# Patient Record
Sex: MALE | Race: WHITE | NOT HISPANIC OR LATINO | Employment: OTHER | URBAN - METROPOLITAN AREA
[De-identification: names, ages, dates, MRNs, and addresses within clinical notes are randomized per-mention and may not be internally consistent; named-entity substitution may affect disease eponyms.]

---

## 2017-03-02 ENCOUNTER — OFFICE VISIT (OUTPATIENT)
Dept: NEUROLOGY | Facility: CLINIC | Age: 82
End: 2017-03-02

## 2017-03-02 VITALS
HEIGHT: 72 IN | DIASTOLIC BLOOD PRESSURE: 60 MMHG | WEIGHT: 167 LBS | BODY MASS INDEX: 22.62 KG/M2 | SYSTOLIC BLOOD PRESSURE: 110 MMHG

## 2017-03-02 DIAGNOSIS — B02.29 POST HERPETIC NEURALGIA: Primary | ICD-10-CM

## 2017-03-02 PROCEDURE — 99204 OFFICE O/P NEW MOD 45 MIN: CPT | Performed by: PSYCHIATRY & NEUROLOGY

## 2017-03-02 RX ORDER — PREGABALIN 75 MG/1
75 CAPSULE ORAL 2 TIMES DAILY
Qty: 60 CAPSULE | Refills: 5 | Status: SHIPPED | OUTPATIENT
Start: 2017-03-02 | End: 2017-03-30

## 2017-03-02 RX ORDER — PREGABALIN 75 MG/1
75 CAPSULE ORAL 2 TIMES DAILY
Qty: 28 CAPSULE | Refills: 0 | Status: SHIPPED | OUTPATIENT
Start: 2017-03-02 | End: 2017-03-30

## 2017-03-02 NOTE — PROGRESS NOTES
"CC: Postherpetic neuralgia    HPI:  Silas Haines is a  81 y.o.  right-handed white male who was sent for neurologic consultation by Dr. Parker who has a history of multiple myeloma diagnosed in 2014.  He developed shingles 10/2015 in the left thoracic dermatomes.  He has had persistent severe pain ever since.  He describes burning clamping and cutting sensations which are constant.  They average a pain score of 7/10.  He was tried on gabapentin and he believes he was up to 600 mg 3 times a day but he developed pedal edema.  He remains on a water pill every third day.  It doesn't accumulate significantly currently.  It was stopped.  He has tried Jennie's wort as well as other herbal remedies of no assistance.  Lidoderm was tried which helps transiently but he developed a rash from the patch.  An MRI of the thoracic spine was obtained showing a couple of minor disc bulges on the right.  Nothing on the left.  He has bony changes consistent with his myeloma.  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  He has no history of significant head trauma meningitis seizures stroke or syncope.  He has no complaint of headache.  Minimal back pain from degenerative disc disease.  An antidepressant was suggested but he didn't take it because of side effect profile      Past Medical History   Diagnosis Date   • Anemia 2014   • Bladder cancer 2010   • History of shingles 10/2015   • Malignant neoplasm of prostate    • Prostate cancer 2003   • Skin cancer (melanoma) 2014     Multiple myeloma         Past Surgical History   Procedure Laterality Date   • Bladder surgery     • Cataract extraction     • Coronary artery bypass graft       Triple   • Mohs surgery  08/2016     top of head with skin graft from left arm   • Cyst removal  1956     Pilonadil   • Prostate radioactive seed implant       Prostate \"seeding implant\"           Current Outpatient " Prescriptions:   •  aspirin 81 MG tablet, Take by mouth daily., Disp: , Rfl:   •  atorvastatin (LIPITOR) 20 MG tablet, Take 1 tablet by mouth daily., Disp: 90 tablet, Rfl: 1  •  Cholecalciferol (VITAMIN D-3) 1000 UNITS capsule, Take by mouth., Disp: , Rfl:   •  hydrochlorothiazide (MICROZIDE) 12.5 MG capsule, , Disp: , Rfl:   •  isosorbide mononitrate (IMDUR) 30 MG 24 hr tablet, Take by mouth., Disp: , Rfl:   •  lenalidomide (REVLIMID) 10 MG capsule, Take by mouth daily., Disp: , Rfl:   •  lisinopril (PRINIVIL,ZESTRIL) 5 MG tablet, Take 1 tablet by mouth Daily., Disp: 90 tablet, Rfl: 3  •  Multiple Vitamin (MULTI VITAMIN MENS) tablet, Take 1 tablet by mouth daily., Disp: , Rfl:   •  mupirocin (BACTROBAN) 2 % ointment, , Disp: , Rfl:   •  BETIMOL 0.5 % ophthalmic solution, , Disp: , Rfl:   •  brimonidine (ALPHAGAN) 0.2 % ophthalmic solution, Apply to eye., Disp: , Rfl:   •  lidocaine (LIDODERM) 5 %, , Disp: , Rfl:   •  pregabalin (LYRICA) 75 MG capsule, Take 1 capsule by mouth 2 (Two) Times a Day., Disp: 28 capsule, Rfl: 0  •  pregabalin (LYRICA) 75 MG capsule, Take 1 capsule by mouth 2 (Two) Times a Day., Disp: 60 capsule, Rfl: 5  •  valACYclovir (VALTREX) 1000 MG tablet, Take 1 tablet by mouth 3 (Three) Times a Day., Disp: 21 tablet, Rfl: 0  •  valACYclovir (VALTREX) 1000 MG tablet, Take 1 tablet by mouth 3 (Three) Times a Day., Disp: 21 tablet, Rfl: 0      Family History   Problem Relation Age of Onset   • Coronary artery disease Mother    • Hypertension Mother    • Heart failure Father    • Prostate cancer Father      Malignant neoplasm of prostate         Social History     Social History   • Marital status:      Spouse name: N/A   • Number of children: N/A   • Years of education: N/A     Occupational History   • Not on file.     Social History Main Topics   • Smoking status: Never Smoker   • Smokeless tobacco: Not on file   • Alcohol use No   • Drug use: Not on file   • Sexual activity: Not on file  "    Other Topics Concern   • Not on file     Social History Narrative         Allergies   Allergen Reactions   • Macrobid [Nitrofurantoin Monohyd Macro]    • Pamidronate Other (See Comments)     Shruthi orbital edema X 2--rare but reported adverse event.   • Penicillins          Pain Scale: 7/10        ROS:  Review of Systems   Constitutional: Negative for activity change, appetite change and fatigue.   HENT: Negative for ear pain, facial swelling and hearing loss.    Eyes: Negative for pain, redness and itching.   Respiratory: Negative for cough, choking and shortness of breath.    Cardiovascular: Negative for chest pain and leg swelling.   Gastrointestinal: Negative for abdominal pain, nausea and vomiting.   Endocrine: Positive for cold intolerance. Negative for heat intolerance.   Musculoskeletal: Positive for back pain. Negative for arthralgias and gait problem.   Skin: Negative for color change, pallor, rash and wound.   Allergic/Immunologic: Negative for environmental allergies and food allergies.   Neurological: Negative for dizziness, tremors, seizures, syncope, facial asymmetry, speech difficulty, weakness, light-headedness, numbness and headaches.   Hematological: Negative for adenopathy. Bruises/bleeds easily.   Psychiatric/Behavioral: Negative for agitation, behavioral problems, confusion, decreased concentration, dysphoric mood, hallucinations, self-injury, sleep disturbance and suicidal ideas. The patient is not nervous/anxious and is not hyperactive.            Physical Exam:  Vitals:    03/02/17 0839   BP: 110/60   Weight: 167 lb (75.8 kg)   Height: 72\" (182.9 cm)     Body mass index is 22.65 kg/(m^2).    Physical Exam  Gen.: Well-developed white male no acute distress  HEENT: Normocephalic no evidence of trauma.  Discs flat the left disc is pale.  He is status post cataract extractions.  No A-V nicking noted.  Throat negative.  Neck: Supple.  No thyromegaly.  No cervical bruits.  Radial pulses were " strong and simultaneous.  Heart: Regular rate and rhythm no murmurs.      Neurological Exam:   Mental status: Awake alert oriented and conversant without evidence of an affective disorder thought disorder delusions or hallucinations.  HCF: No aphasia or apraxia or dysarthria.  Recent and remote memory intact.  Knowledge of recent events intact.  Cranial nerves: Severe visual loss in the left eye.  Visual fields are full in the right eye.  Pupils are poorly reactive status post surgery.  Light touch and pinprick normal.  Face symmetric.  Hearing was intact that he has bilateral hearing aids.  Soft palate elevates equally.  Sternomastoid and strong.  Tongue midline.  Motor: Normal tone and bulk.  Full power in all muscles tested arms and legs.  Reflexes: +2 diffusely except ankle jerks reduced with downgoing toe signs  Sensory: Light touch and pinprick diffusely intact.  Vibration sense was reduced at the left ankle and absent on the right.  Position sense intact in the great toes.  Gait and Station: Casual walk is mildly broad-based.  He can toe and heel walk.  He has some trouble tandem walking.  No Romberg no drift        Results:      Lab Results   Component Value Date    BUN 24 (H) 08/19/2016    CREATININE 1.69 (H) 08/19/2016    EGFRIFNONA 39 (L) 08/19/2016    EGFRIFAFRI 48 (L) 08/19/2016    BCR 14.2 08/19/2016    CO2 27.4 08/19/2016    CALCIUM 8.9 08/19/2016    PROTENTOTREF 6.9 08/19/2016    ALBUMIN 3.90 08/19/2016    LABIL2 1.3 08/19/2016    AST 17 08/19/2016    ALT 18 08/19/2016       Lab Results   Component Value Date    WBC 3.13 (L) 08/19/2016    WBC 0-2 (A) 08/19/2016    HGB 9.2 (L) 08/19/2016    HCT 28.1 (L) 08/19/2016    MCV 99.3 (H) 08/19/2016     08/19/2016         .No results found for: RPR      Lab Results   Component Value Date    TSH 1.950 08/19/2016         No results found for: [id number]      No results found for: FOLATE      No results found for: HGBA1C    MRI thoracic spine films  reviewed as above      Assessment:   Postherpetic neuralgia left low thoracic spine-no improvement with gabapentin and he develops side effects.  Topical lidocaine caused a rash.  He has not been tried on pregabalin or an antidepressant however he has some arrhythmia history making antidepressants perhaps higher risk since most are potential causes of arrhythmias          Plan:  Trial of pregabalin 75 mg twice a day for 2 weeks.  Given card for free medications.  Wrote second prescription for 75 mg twice a day also.  Follow-up in a month                          Much of this encounter note is an electronic transcription/translation of spoken language to printed text. The electronic translation of spoken language may permit erroneous, or at times, nonsensical words or phrases to be inadvertently transcribed; although I have reviewed the note for such errors, some may still exist.

## 2017-03-22 ENCOUNTER — TELEPHONE (OUTPATIENT)
Dept: NEUROLOGY | Facility: CLINIC | Age: 82
End: 2017-03-22

## 2017-03-22 NOTE — TELEPHONE ENCOUNTER
Not sure what to do with that other than it appears it was helping when he was on it.  I don't have other suggestions until he is back on the medication and we see what it does

## 2017-03-22 NOTE — TELEPHONE ENCOUNTER
Pt was out of town and was taking the 14 day trial Lyrica. In between trying to get it approved through insurance he couldn't get a refill so he wasn't able to take it. Pain has progressed and is actually a lot  worse then it was before starting lyrica.   Please advise

## 2017-03-22 NOTE — TELEPHONE ENCOUNTER
----- Message from Hussein Uriarte sent at 3/22/2017  4:15 PM EDT -----  Contact: Waleska Moran (pt's wife)  Dr. Jules prescribed Lyrica, pt only took it for two days, it made his pain worse.  Please call the wife at either [phone] or [phone]

## 2017-03-23 NOTE — TELEPHONE ENCOUNTER
S/w pt and wife there was some miscommunications about the issue with lyrica. The lyrica only worked for 2 days and piercing pain started there after. He continued to take it with no improvement but worsen pain. Had about 3 days left of 14 day trial and couldn't get it refilled so he stopped it completely. He said pain level is back to where it was before starting lyrica. He's not going to restart because he feels it made the pain worse. Also he has a quarter size red spot on his back where most of the pain is. He has used biofreeze on the spot but not sure what to do next.

## 2017-03-30 ENCOUNTER — HOSPITAL ENCOUNTER (OUTPATIENT)
Dept: GENERAL RADIOLOGY | Facility: HOSPITAL | Age: 82
Discharge: HOME | End: 2017-03-30
Attending: PSYCHIATRY & NEUROLOGY | Admitting: PSYCHIATRY & NEUROLOGY

## 2017-03-30 ENCOUNTER — OFFICE VISIT (OUTPATIENT)
Dept: NEUROLOGY | Facility: CLINIC | Age: 82
End: 2017-03-30

## 2017-03-30 VITALS
HEIGHT: 72 IN | BODY MASS INDEX: 23.3 KG/M2 | SYSTOLIC BLOOD PRESSURE: 120 MMHG | WEIGHT: 172 LBS | DIASTOLIC BLOOD PRESSURE: 74 MMHG

## 2017-03-30 DIAGNOSIS — R07.81 RIB PAIN ON LEFT SIDE: Primary | ICD-10-CM

## 2017-03-30 DIAGNOSIS — R07.81 RIB PAIN ON LEFT SIDE: ICD-10-CM

## 2017-03-30 DIAGNOSIS — B02.29 POST HERPETIC NEURALGIA: ICD-10-CM

## 2017-03-30 PROCEDURE — 99213 OFFICE O/P EST LOW 20 MIN: CPT | Performed by: PSYCHIATRY & NEUROLOGY

## 2017-03-30 PROCEDURE — 71101 X-RAY EXAM UNILAT RIBS/CHEST: CPT

## 2017-03-30 RX ORDER — LIDOCAINE 50 MG/G
OINTMENT TOPICAL 3 TIMES DAILY
Qty: 50 G | Refills: 3 | Status: SHIPPED | OUTPATIENT
Start: 2017-03-30 | End: 2017-12-04

## 2017-03-30 RX ORDER — LIDOCAINE 5 G/100G
CREAM RECTAL; TOPICAL 3 TIMES DAILY
Status: DISCONTINUED | OUTPATIENT
Start: 2017-03-30 | End: 2017-03-30

## 2017-03-30 RX ORDER — LEVETIRACETAM 500 MG/1
TABLET ORAL
Qty: 60 TABLET | Refills: 3 | Status: SHIPPED | OUTPATIENT
Start: 2017-03-30 | End: 2017-11-03

## 2017-03-30 NOTE — PROGRESS NOTES
"CC: Postherpetic neuralgia    HPI:  Silas Haines is a  81 y.o.  right-handed white male who I saw previously 3/2/17 with postherpetic neuralgia left thoracic spine around T8 or T9 distribution.  He had pedal edema as a side effect to gabapentin.  I tried him on Lyrica he states that it got worse and he stopped the medication.  He had tried Lidoderm patches which helped the pain he gets skin reaction to the patch.  He takes Tylenol which helps a little he says.  He notes a particular spot on the back where there is a lot of pain which emanates from.    He had an MRI of the thoracic spine somewhat more than 1 year ago.  I reviewed the films.  He had mottling of bones including ribs consistent with his diagnosis of myeloma.  In addition he had a disc bulge at T9/10 and another one at T7/8 on the left side.  I reviewed this and although there is some effacement of the left side of the cord at each level it's not very significant.    His recent CBCs were reviewed showing white count of about 3.5 and hemoglobin of around 9.5.  This is a little better than last summer.  ([facility] system)        Past Medical History:   Diagnosis Date   • Anemia 2014   • Bladder cancer 2010   • History of shingles 10/2015   • Malignant neoplasm of prostate    • Prostate cancer 2003   • Skin cancer (melanoma) 2014    Multiple myeloma         Past Surgical History:   Procedure Laterality Date   • BLADDER SURGERY     • CATARACT EXTRACTION     • CORONARY ARTERY BYPASS GRAFT      Triple   • CYST REMOVAL  1956    Pilonadil   • MOHS SURGERY  08/2016    top of head with skin graft from left arm   • PROSTATE RADIOACTIVE SEED IMPLANT      Prostate \"seeding implant\"           Current Outpatient Prescriptions:   •  aspirin 81 MG tablet, Take by mouth daily., Disp: , Rfl:   •  atorvastatin (LIPITOR) 20 MG tablet, Take 1 tablet by mouth daily., Disp: 90 tablet, Rfl: 1  •  Cholecalciferol (VITAMIN D-3) 1000 UNITS capsule, Take by mouth., Disp: , Rfl: "   •  hydrochlorothiazide (MICROZIDE) 12.5 MG capsule, , Disp: , Rfl:   •  isosorbide mononitrate (IMDUR) 30 MG 24 hr tablet, Take by mouth., Disp: , Rfl:   •  lenalidomide (REVLIMID) 10 MG capsule, Take by mouth daily., Disp: , Rfl:   •  Multiple Vitamin (MULTI VITAMIN MENS) tablet, Take 1 tablet by mouth daily., Disp: , Rfl:   •  brimonidine (ALPHAGAN) 0.2 % ophthalmic solution, Apply to eye., Disp: , Rfl:   •  levETIRAcetam (KEPPRA) 500 MG tablet, 1/2 TAB PO BID FOR 1 WEEK THEN 1 TAB PO BID, Disp: 60 tablet, Rfl: 3  •  lidocaine (XYLOCAINE) 5 % ointment, Apply  topically 3 (Three) Times a Day. Apply to painful area on back tid and cover, Disp: 50 g, Rfl: 3  •  mupirocin (BACTROBAN) 2 % ointment, , Disp: , Rfl:   •  valACYclovir (VALTREX) 1000 MG tablet, Take 1 tablet by mouth 3 (Three) Times a Day., Disp: 21 tablet, Rfl: 0  No current facility-administered medications for this visit.       Family History   Problem Relation Age of Onset   • Coronary artery disease Mother    • Hypertension Mother    • Heart failure Father    • Prostate cancer Father      Malignant neoplasm of prostate         Social History     Social History   • Marital status:      Spouse name: N/A   • Number of children: N/A   • Years of education: N/A     Occupational History   • Not on file.     Social History Main Topics   • Smoking status: Never Smoker   • Smokeless tobacco: Not on file   • Alcohol use No   • Drug use: Not on file   • Sexual activity: Not on file     Other Topics Concern   • Not on file     Social History Narrative         Allergies   Allergen Reactions   • Macrobid [Nitrofurantoin Monohyd Macro]    • Pamidronate Other (See Comments)     Shruthi orbital edema X 2--rare but reported adverse event.   • Penicillins          Pain Scale: 5+ at all times/10 with a lot of burning characteristic        ROS:  Review of Systems   Constitutional: Negative for activity change, appetite change and fatigue.   HENT: Negative for ear  "pain, facial swelling and hearing loss.    Eyes: Negative for pain, redness and itching.   Respiratory: Negative for cough, choking and shortness of breath.    Cardiovascular: Negative for chest pain and leg swelling.   Gastrointestinal: Negative for abdominal pain, nausea and vomiting.   Endocrine: Negative for cold intolerance and heat intolerance.   Musculoskeletal: Positive for back pain. Negative for arthralgias and joint swelling.   Skin: Negative for color change, pallor, rash and wound.   Allergic/Immunologic: Negative for environmental allergies and food allergies.   Neurological: Negative for dizziness, tremors, seizures, syncope, facial asymmetry, speech difficulty, weakness, light-headedness, numbness and headaches.   Hematological: Negative for adenopathy. Does not bruise/bleed easily.   Psychiatric/Behavioral: Negative for agitation, behavioral problems, confusion, decreased concentration, dysphoric mood, hallucinations, self-injury, sleep disturbance and suicidal ideas. The patient is not nervous/anxious and is not hyperactive.            Physical Exam:  Vitals:    03/30/17 1128   BP: 120/74   Weight: 172 lb (78 kg)   Height: 72\" (182.9 cm)     Body mass index is 23.33 kg/(m^2).    Physical Exam  Gen.: Well-developed white male no acute distress.  HEENT: Normocephalic no evidence of trauma.  Examination of his thoracic spine shows an erythematous area with a little swelling roughly at the T8 rib on the left part of which is due to the patch and part of it is due to a salve placed on that area or more rapid healing.  No acute herpetic lesions are identified      Neurological Exam:   Mental status: Awake alert oriented and conversant without evidence of an affective disorder thought disorder delusions or hallucinations.  HCF: No aphasia or apraxia or dysarthria.  Recent and remote memory intact.  Knowledge of recent events intact.  Cranial nerves: 2-12 intact with reduced hearing  Motor: Nonfocal  Gait " and Station: Casual walk is normal          Results:      Lab Results   Component Value Date    BUN 24 (H) 08/19/2016    CREATININE 1.69 (H) 08/19/2016    EGFRIFNONA 39 (L) 08/19/2016    EGFRIFAFRI 48 (L) 08/19/2016    BCR 14.2 08/19/2016    CO2 27.4 08/19/2016    CALCIUM 8.9 08/19/2016    PROTENTOTREF 6.9 08/19/2016    ALBUMIN 3.90 08/19/2016    LABIL2 1.3 08/19/2016    AST 17 08/19/2016    ALT 18 08/19/2016       Lab Results   Component Value Date    WBC 3.13 (L) 08/19/2016    WBC 0-2 (A) 08/19/2016    HGB 9.2 (L) 08/19/2016    HCT 28.1 (L) 08/19/2016    MCV 99.3 (H) 08/19/2016     08/19/2016         .No results found for: RPR      Lab Results   Component Value Date    TSH 1.950 08/19/2016         No results found for: [id number]      No results found for: FOLATE      No results found for: HGBA1C      MRI thoracic spine from January 2016 reviewed as above    Assessment:   Postherpetic neuralgia-side effects to gabapentin and pregabalin.  He has low counts related to his myeloma and/or chemotherapy.  Other epilepsy drugs that could be considered include Tegretol and Dilantin however these may cause problems with blood counts.  Additional option would be Keppra which seems to be the most logical given little risk of abnormalities of blood counts and the fact that he has had kidney stones in the past which basically excludes Topamax from the list.    I suspect the skin reaction he has experienced 2 Lidoderm patches related to the glue and not the lidocaine itself and so use of lidocaine ointment may be of help          Plan:  1.  Trial of Keppra, 250 mg by mouth twice a day for a week or 2 then 500 mg by mouth twice a day if no effect.  Side effects reviewed.  2.  Trial of lidocaine ointment 5% rubbed on the area of the mid back.  Cover as needed.  3.  If these are unsuccessful then I think it's reasonable to send him to pain management given that he has a disc bulge roughly at the correct space as well as  possible bony involvement from his myeloma.  Local blocks may be of help given that circumstance potentially being part of his pain problem.  4.  Rib detail films on the left.  5.  Follow-up 2 months.  Call if treatment is unsuccessful                          Much of this encounter note is an electronic transcription/translation of spoken language to printed text. The electronic translation of spoken language may permit erroneous, or at times, nonsensical words or phrases to be inadvertently transcribed; although I have reviewed the note for such errors, some may still exist.

## 2017-05-04 ENCOUNTER — TELEPHONE (OUTPATIENT)
Dept: NEUROLOGY | Facility: CLINIC | Age: 82
End: 2017-05-04

## 2017-05-09 DIAGNOSIS — B02.29 POSTHERPETIC NEURALGIA: ICD-10-CM

## 2017-05-09 DIAGNOSIS — R07.81 RIB PAIN ON LEFT SIDE: Primary | ICD-10-CM

## 2017-05-12 ENCOUNTER — OFFICE VISIT (OUTPATIENT)
Dept: RADIATION ONCOLOGY | Facility: HOSPITAL | Age: 82
End: 2017-05-12

## 2017-05-12 ENCOUNTER — TELEPHONE (OUTPATIENT)
Dept: NEUROLOGY | Facility: CLINIC | Age: 82
End: 2017-05-12

## 2017-05-12 VITALS
OXYGEN SATURATION: 99 % | TEMPERATURE: 96.7 F | DIASTOLIC BLOOD PRESSURE: 71 MMHG | HEART RATE: 68 BPM | RESPIRATION RATE: 16 BRPM | SYSTOLIC BLOOD PRESSURE: 127 MMHG

## 2017-05-12 DIAGNOSIS — IMO0002 SQUAMOUS CELL CARCINOMA: Primary | ICD-10-CM

## 2017-05-12 PROCEDURE — 99213 OFFICE O/P EST LOW 20 MIN: CPT | Performed by: RADIOLOGY

## 2017-05-18 ENCOUNTER — TELEPHONE (OUTPATIENT)
Dept: NEUROLOGY | Facility: CLINIC | Age: 82
End: 2017-05-18

## 2017-10-02 RX ORDER — ATORVASTATIN CALCIUM 20 MG/1
TABLET, FILM COATED ORAL
Qty: 90 TABLET | Refills: 0 | Status: SHIPPED | OUTPATIENT
Start: 2017-10-02 | End: 2017-12-04 | Stop reason: SDUPTHER

## 2017-10-05 ENCOUNTER — CLINICAL SUPPORT (OUTPATIENT)
Dept: INTERNAL MEDICINE | Facility: CLINIC | Age: 82
End: 2017-10-05

## 2017-10-05 PROCEDURE — G0008 ADMIN INFLUENZA VIRUS VAC: HCPCS | Performed by: NURSE PRACTITIONER

## 2017-11-03 ENCOUNTER — OFFICE VISIT (OUTPATIENT)
Dept: INTERNAL MEDICINE | Facility: CLINIC | Age: 82
End: 2017-11-03

## 2017-11-03 VITALS
OXYGEN SATURATION: 98 % | RESPIRATION RATE: 16 BRPM | BODY MASS INDEX: 22.62 KG/M2 | TEMPERATURE: 97 F | HEIGHT: 72 IN | SYSTOLIC BLOOD PRESSURE: 150 MMHG | WEIGHT: 167 LBS | DIASTOLIC BLOOD PRESSURE: 70 MMHG | HEART RATE: 54 BPM

## 2017-11-03 DIAGNOSIS — K40.90 INGUINAL HERNIA OF LEFT SIDE WITHOUT OBSTRUCTION OR GANGRENE: Primary | ICD-10-CM

## 2017-11-03 DIAGNOSIS — Z12.11 ENCOUNTER FOR SCREENING COLONOSCOPY: ICD-10-CM

## 2017-11-03 PROCEDURE — 99213 OFFICE O/P EST LOW 20 MIN: CPT | Performed by: NURSE PRACTITIONER

## 2017-11-03 NOTE — PROGRESS NOTES
Vitals:    11/03/17 1438   BP: 150/70   Pulse: 54   Resp: 16   Temp: 97 °F (36.1 °C)   SpO2: 98%     Last 2 weights    11/03/17  1438   Weight: 167 lb (75.8 kg)     Social History   Substance Use Topics   • Smoking status: Never Smoker   • Smokeless tobacco: Not on file   • Alcohol use No       Subjective     HPI  Pt presents to office today with new problem of left groin bulge/pain for the past several days. Pt states he has noticed it a lot since straining to have bowel movements. He states he recently fractured his right hip and was found to have osteoporosis. He was started on calcium supplements and since then his constipation has increase which then he started noticing the above symptoms. Worse with pressure like coughing, sneezing, laughing. Pt has dealt with constipation for several years. He takes a stool softener and miralax everyday    The following portions of the patient's history were reviewed and updated as appropriate: allergies, current medications, past medical history, past social history and problem list.    Review of Systems   Constitutional: Negative.    Respiratory: Negative.    Cardiovascular: Negative.    Gastrointestinal: Positive for constipation.        Left groin bulge and pain       Objective     Physical Exam   Constitutional: He is oriented to person, place, and time. Vital signs are normal. He appears well-developed and well-nourished.   HENT:   Head: Normocephalic and atraumatic.   Neck: Normal range of motion.   Cardiovascular: Normal rate, regular rhythm and normal heart sounds.    Pulmonary/Chest: Effort normal and breath sounds normal.   Abdominal: Soft. Normal appearance. There is tenderness in the left lower quadrant. A hernia is present. Hernia confirmed positive in the left inguinal area.   Musculoskeletal: Normal range of motion.   Neurological: He is oriented to person, place, and time.   Nursing note and vitals reviewed.      Assessment/Plan   Silas was seen today for  mass.    Diagnoses and all orders for this visit:    Inguinal hernia of left side without obstruction or gangrene  -     Ambulatory Referral to General Surgery    Encounter for screening colonoscopy  -     Ambulatory Referral to General Surgery           -refer to gen sx for evaluation. Pt also states he need a colonoscopy  -gave amitza samples for constipation. If he likes them will send script it  -cont home meds  -FU prn or if symptoms persist/worsen

## 2017-11-06 RX ORDER — LUBIPROSTONE 8 UG/1
8 CAPSULE ORAL 2 TIMES DAILY WITH MEALS
Qty: 60 CAPSULE | Refills: 2 | Status: SHIPPED | OUTPATIENT
Start: 2017-11-06 | End: 2018-08-29

## 2017-11-16 ENCOUNTER — OFFICE VISIT (OUTPATIENT)
Dept: SURGERY | Facility: CLINIC | Age: 82
End: 2017-11-16

## 2017-11-16 VITALS — HEART RATE: 73 BPM | WEIGHT: 162 LBS | BODY MASS INDEX: 21.94 KG/M2 | HEIGHT: 72 IN | OXYGEN SATURATION: 98 %

## 2017-11-16 DIAGNOSIS — K40.90 LEFT INGUINAL HERNIA: Primary | ICD-10-CM

## 2017-11-16 PROCEDURE — 99204 OFFICE O/P NEW MOD 45 MIN: CPT | Performed by: SURGERY

## 2017-11-16 RX ORDER — MULTIVITAMIN WITH IRON
1 TABLET ORAL DAILY
COMMUNITY
End: 2019-03-01

## 2017-11-16 NOTE — PROGRESS NOTES
SUMMARY (A/P):    82-year-old gentleman with symptomatic left inguinal hernia that he wishes to have repaired.  He understands the rationale for the procedure, the nature of the procedure, and the risks including but not limited to bleeding, infection, use of mesh, and recurrence.  He also understands that his risks associated with surgery are increased secondary to his age and comorbidities, particularly coronary disease.  Due to his increased risks of recommended proceeding with open left inguinal hernia repair utilizing monitored sedation to avoid the additional risk of general anesthetic.      CC:  Hernia    HPI:  82-year-old gentleman since August of this year with mild left inguinal pain associated with visible and palpable bulge, worse with activity.    PHYSICAL EXAM:   Constitutional: Well-developed well-nourished, no acute distress   Heart rate 73   Weight (pounds) 162   BMI 22   Height (inches) 72  Eyes: Conjunctiva normal, sclera nonicteric  ENMT: Hearing grossly normal, oral mucosa moist  Neck: Supple, no palpable mass, normal thyroid, trachea midline  Respiratory: Clear to auscultation, normal inspiratory effort  Cardiovascular: Regular rate, no murmur, no carotid bruit, no peripheral edema, no jugular venous distention  Gastrointestinal: Soft, nontender, no palpable mass, no hepatosplenomegaly, negative for hernia, bowel sounds normal  Genitourinary: Moderate reducible left inguinal hernia, no evidence of right inguinal hernia, testicles descended and normal  Lymphatics (palpable nodes):  cervical-negative, axillary-negative, inguinal-negative  Skin:  Warm, dry, no rash on visualized skin surfaces  Musculoskeletal: Symmetric strength, normal gait  Psychiatric: Alert and oriented ×3, normal affect     ALLERGIES: reviewed, in Epic    MEDICATIONS: reviewed, in Epic    PMH:    -Coronary artery disease  -Hypertension  -Osteoporosis  -Multiple myeloma  -Hyperlipidemia  -Shingles 2015  -Prostate cancer  2003  -Bladder cancer 2010  -Melanoma 2014    PSH:    -Coronary artery bypass grafting 2003  -Mohs surgery 2016  -Prostate seed implant 2003  -Cystoscopic resection bladder cancer 2010  -Hip pinning 2017    FAMILY HISTORY:    -Negative for colorectal cancer  -Positive for prostate cancer    SOCIAL HISTORY:   Denies tobacco use  Denies alcohol use    ROS:  No chest pain or shortness of air.  Negative for dysuria.  Positive for chronic constipation for which she uses M a T is a and over-the-counter products.  All other systems reviewed and negative other than presenting complaints.    PRIETO ESPINOZA M.D.

## 2017-12-04 ENCOUNTER — APPOINTMENT (OUTPATIENT)
Dept: PREADMISSION TESTING | Facility: HOSPITAL | Age: 82
End: 2017-12-04

## 2017-12-04 VITALS
RESPIRATION RATE: 16 BRPM | HEART RATE: 63 BPM | SYSTOLIC BLOOD PRESSURE: 153 MMHG | TEMPERATURE: 98.4 F | HEIGHT: 72 IN | BODY MASS INDEX: 22.35 KG/M2 | WEIGHT: 165 LBS | OXYGEN SATURATION: 99 % | DIASTOLIC BLOOD PRESSURE: 76 MMHG

## 2017-12-04 LAB
ANION GAP SERPL CALCULATED.3IONS-SCNC: 10.5 MMOL/L
BUN BLD-MCNC: 24 MG/DL (ref 8–23)
BUN/CREAT SERPL: 19.5 (ref 7–25)
CALCIUM SPEC-SCNC: 9.2 MG/DL (ref 8.6–10.5)
CHLORIDE SERPL-SCNC: 100 MMOL/L (ref 98–107)
CO2 SERPL-SCNC: 25.5 MMOL/L (ref 22–29)
CREAT BLD-MCNC: 1.23 MG/DL (ref 0.76–1.27)
DEPRECATED RDW RBC AUTO: 62.8 FL (ref 37–54)
ERYTHROCYTE [DISTWIDTH] IN BLOOD BY AUTOMATED COUNT: 16.7 % (ref 11.5–14.5)
GFR SERPL CREATININE-BSD FRML MDRD: 56 ML/MIN/1.73
GLUCOSE BLD-MCNC: 98 MG/DL (ref 65–99)
HCT VFR BLD AUTO: 31.1 % (ref 40.4–52.2)
HGB BLD-MCNC: 10.1 G/DL (ref 13.7–17.6)
MCH RBC QN AUTO: 33.6 PG (ref 27–32.7)
MCHC RBC AUTO-ENTMCNC: 32.5 G/DL (ref 32.6–36.4)
MCV RBC AUTO: 103.3 FL (ref 79.8–96.2)
PLATELET # BLD AUTO: 217 10*3/MM3 (ref 140–500)
PMV BLD AUTO: 10.1 FL (ref 6–12)
POTASSIUM BLD-SCNC: 4 MMOL/L (ref 3.5–5.2)
RBC # BLD AUTO: 3.01 10*6/MM3 (ref 4.6–6)
SODIUM BLD-SCNC: 136 MMOL/L (ref 136–145)
WBC NRBC COR # BLD: 3.31 10*3/MM3 (ref 4.5–10.7)

## 2017-12-04 PROCEDURE — 80048 BASIC METABOLIC PNL TOTAL CA: CPT | Performed by: SURGERY

## 2017-12-04 PROCEDURE — 85027 COMPLETE CBC AUTOMATED: CPT | Performed by: SURGERY

## 2017-12-04 PROCEDURE — 36415 COLL VENOUS BLD VENIPUNCTURE: CPT

## 2017-12-04 RX ORDER — LENALIDOMIDE 10 MG/1
10 CAPSULE ORAL
COMMUNITY
Start: 2017-11-21 | End: 2017-12-04 | Stop reason: SDUPTHER

## 2017-12-04 NOTE — DISCHARGE INSTRUCTIONS
Take the following medications the morning of surgery with a small sip of water:    NONE    General Instructions:  • Do not eat solid food after midnight the night before surgery.  • You may drink clear liquids day of surgery but must stop at least one hour before your hospital arrival time.   ( 0600 AM )  It is beneficial for you to have a clear drink that contains carbohydrates the day of surgery.  We suggest a 12 to 20 ounce bottle of Gatorade or Powerade for non-diabetic patients   Clear liquids are liquids you can see through.  Nothing red in color.     Plain water                               Sports drinks  Sodas                                   Gelatin (Jell-O)  Fruit juices without pulp such as white grape juice and apple juice  Popsicles that contain no fruit or yogurt  Tea or coffee (no cream or milk added)  Gatorade / Powerade  G2 / Powerade Zero    • Patients who avoid smoking, chewing tobacco and alcohol for 4 weeks prior to surgery have a reduced risk of post-operative complications.  Quit smoking as many days before surgery as you can.  • Do not smoke, use chewing tobacco or drink alcohol the day of surgery.   • Bring any papers given to you in the doctor’s office.  • Wear clean comfortable clothes and socks.  • Do not wear contact lenses or make-up.  Bring a case for your glasses. .  • Remove all piercings.  Leave jewelry and any other valuables at home.  • The Pre-Admission Testing nurse will instruct you to bring medications if unable to obtain an accurate list in Pre-Admission Testing.   • REPORT TO OUTPATIENT SURGERY ON 12-8-2017 AT 0700 AM         Preventing a Surgical Site Infection:  • For 2 to 3 days before surgery, avoid shaving with a razor because the razor can irritate skin and make it easier to develop an infection.  • The night prior to surgery sleep in a clean bed with clean clothing.  Do not allow pets to sleep with you.  • Shower on the morning of surgery using a fresh bar of  anti-bacterial soap (such as Dial) and clean washcloth.  Dry with a clean towel and dress in clean clothing.  • Ask your surgeon if you will be receiving antibiotics prior to surgery.  • Make sure you, your family, and all healthcare providers clean their hands with soap and water or an alcohol based hand  before caring for you or your wound.    Day of surgery:  Upon arrival, a Pre-op nurse and Anesthesiologist will review your health history, obtain vital signs, and answer questions you may have.  The only belongings needed at this time will be your home medications and if applicable your C-PAP/BI-PAP machine.  If you are staying overnight your family can leave the rest of your belongings in the car and bring them to your room later.  A Pre-op nurse will start an IV and you may receive medication in preparation for surgery, including something to help you relax.  Your family will be able to see you in the Pre-op area.  While you are in surgery your family should notify the waiting room  if they leave the waiting room area and provide a contact phone number.    Please be aware that surgery does come with discomfort.  We want to make every effort to control your discomfort so please discuss any uncontrolled symptoms with your nurse.   Your doctor will most likely have prescribed pain medications.      If you are going home after surgery you will receive individualized written care instructions before being discharged.  A responsible adult must drive you to and from the hospital on the day of your surgery and stay with you for 24 hours.    If you have any questions please call Pre-Admission Testing at [phone].    Deductibles and co-payments are collected on the day of service. Please be prepared to pay the required co-pay, deductible or deposit on the day of service as defined by your plan.

## 2017-12-08 ENCOUNTER — ANESTHESIA EVENT (OUTPATIENT)
Dept: PERIOP | Facility: HOSPITAL | Age: 82
End: 2017-12-08

## 2017-12-08 ENCOUNTER — HOSPITAL ENCOUNTER (OUTPATIENT)
Facility: HOSPITAL | Age: 82
Setting detail: HOSPITAL OUTPATIENT SURGERY
Discharge: HOME | End: 2017-12-08
Attending: SURGERY | Admitting: SURGERY

## 2017-12-08 ENCOUNTER — ANESTHESIA (OUTPATIENT)
Dept: PERIOP | Facility: HOSPITAL | Age: 82
End: 2017-12-08

## 2017-12-08 VITALS
OXYGEN SATURATION: 100 % | RESPIRATION RATE: 16 BRPM | TEMPERATURE: 98.1 F | DIASTOLIC BLOOD PRESSURE: 77 MMHG | SYSTOLIC BLOOD PRESSURE: 135 MMHG | HEART RATE: 61 BPM

## 2017-12-08 DIAGNOSIS — K40.90 LEFT INGUINAL HERNIA: ICD-10-CM

## 2017-12-08 PROCEDURE — 25010000002 FENTANYL CITRATE (PF) 100 MCG/2ML SOLUTION: Performed by: NURSE ANESTHETIST, CERTIFIED REGISTERED

## 2017-12-08 PROCEDURE — 25010000002 PROPOFOL 10 MG/ML EMULSION: Performed by: NURSE ANESTHETIST, CERTIFIED REGISTERED

## 2017-12-08 PROCEDURE — 25010000002 MIDAZOLAM PER 1 MG: Performed by: ANESTHESIOLOGY

## 2017-12-08 PROCEDURE — 25010000003 CEFAZOLIN IN DEXTROSE 2-4 GM/100ML-% SOLUTION: Performed by: SURGERY

## 2017-12-08 PROCEDURE — C1781 MESH (IMPLANTABLE): HCPCS | Performed by: SURGERY

## 2017-12-08 PROCEDURE — 49505 PRP I/HERN INIT REDUC >5 YR: CPT | Performed by: SURGERY

## 2017-12-08 DEVICE — BARD MESH LARGE PRE-SHAPED WITH KEYHOLE
Type: IMPLANTABLE DEVICE | Status: FUNCTIONAL
Brand: BARD MESH PRE-SHAPED

## 2017-12-08 RX ORDER — SODIUM CHLORIDE 0.9 % (FLUSH) 0.9 %
1-10 SYRINGE (ML) INJECTION AS NEEDED
Status: DISCONTINUED | OUTPATIENT
Start: 2017-12-08 | End: 2017-12-08 | Stop reason: HOSPADM

## 2017-12-08 RX ORDER — HYDROCODONE BITARTRATE AND ACETAMINOPHEN 5; 325 MG/1; MG/1
TABLET ORAL
Qty: 30 TABLET | Refills: 0 | Status: SHIPPED | OUTPATIENT
Start: 2017-12-08 | End: 2018-08-29

## 2017-12-08 RX ORDER — NALBUPHINE HCL 10 MG/ML
2 AMPUL (ML) INJECTION EVERY 4 HOURS PRN
Status: DISCONTINUED | OUTPATIENT
Start: 2017-12-08 | End: 2017-12-08 | Stop reason: HOSPADM

## 2017-12-08 RX ORDER — ACETAMINOPHEN 325 MG/1
650 TABLET ORAL ONCE AS NEEDED
Status: DISCONTINUED | OUTPATIENT
Start: 2017-12-08 | End: 2017-12-08 | Stop reason: HOSPADM

## 2017-12-08 RX ORDER — LIDOCAINE HYDROCHLORIDE 10 MG/ML
0.5 INJECTION, SOLUTION EPIDURAL; INFILTRATION; INTRACAUDAL; PERINEURAL ONCE AS NEEDED
Status: DISCONTINUED | OUTPATIENT
Start: 2017-12-08 | End: 2017-12-08 | Stop reason: HOSPADM

## 2017-12-08 RX ORDER — FENTANYL CITRATE 50 UG/ML
INJECTION, SOLUTION INTRAMUSCULAR; INTRAVENOUS AS NEEDED
Status: DISCONTINUED | OUTPATIENT
Start: 2017-12-08 | End: 2017-12-08 | Stop reason: SURG

## 2017-12-08 RX ORDER — BUPIVACAINE HYDROCHLORIDE AND EPINEPHRINE 5; 5 MG/ML; UG/ML
INJECTION, SOLUTION PERINEURAL AS NEEDED
Status: DISCONTINUED | OUTPATIENT
Start: 2017-12-08 | End: 2017-12-08 | Stop reason: HOSPADM

## 2017-12-08 RX ORDER — HYDRALAZINE HYDROCHLORIDE 20 MG/ML
5 INJECTION INTRAMUSCULAR; INTRAVENOUS
Status: DISCONTINUED | OUTPATIENT
Start: 2017-12-08 | End: 2017-12-08 | Stop reason: HOSPADM

## 2017-12-08 RX ORDER — PROMETHAZINE HYDROCHLORIDE 25 MG/1
25 TABLET ORAL ONCE AS NEEDED
Status: DISCONTINUED | OUTPATIENT
Start: 2017-12-08 | End: 2017-12-08 | Stop reason: HOSPADM

## 2017-12-08 RX ORDER — OXYCODONE HYDROCHLORIDE AND ACETAMINOPHEN 5; 325 MG/1; MG/1
1 TABLET ORAL ONCE AS NEEDED
Status: DISCONTINUED | OUTPATIENT
Start: 2017-12-08 | End: 2017-12-08 | Stop reason: HOSPADM

## 2017-12-08 RX ORDER — NALBUPHINE HCL 10 MG/ML
10 AMPUL (ML) INJECTION EVERY 4 HOURS PRN
Status: DISCONTINUED | OUTPATIENT
Start: 2017-12-08 | End: 2017-12-08 | Stop reason: HOSPADM

## 2017-12-08 RX ORDER — DIPHENHYDRAMINE HYDROCHLORIDE 50 MG/ML
12.5 INJECTION INTRAMUSCULAR; INTRAVENOUS
Status: DISCONTINUED | OUTPATIENT
Start: 2017-12-08 | End: 2017-12-08 | Stop reason: HOSPADM

## 2017-12-08 RX ORDER — SODIUM CHLORIDE, SODIUM LACTATE, POTASSIUM CHLORIDE, CALCIUM CHLORIDE 600; 310; 30; 20 MG/100ML; MG/100ML; MG/100ML; MG/100ML
9 INJECTION, SOLUTION INTRAVENOUS CONTINUOUS
Status: DISCONTINUED | OUTPATIENT
Start: 2017-12-08 | End: 2017-12-08 | Stop reason: HOSPADM

## 2017-12-08 RX ORDER — FAMOTIDINE 10 MG/ML
20 INJECTION, SOLUTION INTRAVENOUS ONCE
Status: COMPLETED | OUTPATIENT
Start: 2017-12-08 | End: 2017-12-08

## 2017-12-08 RX ORDER — PROPOFOL 10 MG/ML
VIAL (ML) INTRAVENOUS CONTINUOUS PRN
Status: DISCONTINUED | OUTPATIENT
Start: 2017-12-08 | End: 2017-12-08 | Stop reason: SURG

## 2017-12-08 RX ORDER — ACETAMINOPHEN 650 MG/1
650 SUPPOSITORY RECTAL ONCE AS NEEDED
Status: DISCONTINUED | OUTPATIENT
Start: 2017-12-08 | End: 2017-12-08 | Stop reason: HOSPADM

## 2017-12-08 RX ORDER — ACETAMINOPHEN 325 MG/1
650 TABLET ORAL ONCE
Status: DISCONTINUED | OUTPATIENT
Start: 2017-12-08 | End: 2017-12-08 | Stop reason: HOSPADM

## 2017-12-08 RX ORDER — PROMETHAZINE HYDROCHLORIDE 25 MG/ML
6.25 INJECTION, SOLUTION INTRAMUSCULAR; INTRAVENOUS ONCE AS NEEDED
Status: DISCONTINUED | OUTPATIENT
Start: 2017-12-08 | End: 2017-12-08 | Stop reason: HOSPADM

## 2017-12-08 RX ORDER — NALOXONE HCL 0.4 MG/ML
0.4 VIAL (ML) INJECTION AS NEEDED
Status: DISCONTINUED | OUTPATIENT
Start: 2017-12-08 | End: 2017-12-08 | Stop reason: HOSPADM

## 2017-12-08 RX ORDER — MIDAZOLAM HYDROCHLORIDE 1 MG/ML
2 INJECTION INTRAMUSCULAR; INTRAVENOUS
Status: DISCONTINUED | OUTPATIENT
Start: 2017-12-08 | End: 2017-12-08 | Stop reason: HOSPADM

## 2017-12-08 RX ORDER — MAGNESIUM HYDROXIDE 1200 MG/15ML
LIQUID ORAL AS NEEDED
Status: DISCONTINUED | OUTPATIENT
Start: 2017-12-08 | End: 2017-12-08 | Stop reason: HOSPADM

## 2017-12-08 RX ORDER — MIDAZOLAM HYDROCHLORIDE 1 MG/ML
1 INJECTION INTRAMUSCULAR; INTRAVENOUS
Status: DISCONTINUED | OUTPATIENT
Start: 2017-12-08 | End: 2017-12-08 | Stop reason: HOSPADM

## 2017-12-08 RX ORDER — CEFAZOLIN SODIUM 2 G/100ML
2 INJECTION, SOLUTION INTRAVENOUS ONCE
Status: COMPLETED | OUTPATIENT
Start: 2017-12-08 | End: 2017-12-08

## 2017-12-08 RX ORDER — FENTANYL CITRATE 50 UG/ML
50 INJECTION, SOLUTION INTRAMUSCULAR; INTRAVENOUS
Status: DISCONTINUED | OUTPATIENT
Start: 2017-12-08 | End: 2017-12-08 | Stop reason: HOSPADM

## 2017-12-08 RX ORDER — ONDANSETRON 4 MG/1
4 TABLET, FILM COATED ORAL EVERY 6 HOURS PRN
Qty: 10 TABLET | Refills: 1 | Status: SHIPPED | OUTPATIENT
Start: 2017-12-08 | End: 2017-12-14

## 2017-12-08 RX ORDER — PROMETHAZINE HYDROCHLORIDE 25 MG/1
25 SUPPOSITORY RECTAL ONCE AS NEEDED
Status: DISCONTINUED | OUTPATIENT
Start: 2017-12-08 | End: 2017-12-08 | Stop reason: HOSPADM

## 2017-12-08 RX ADMIN — FENTANYL CITRATE 25 MCG: 50 INJECTION INTRAMUSCULAR; INTRAVENOUS at 08:44

## 2017-12-08 RX ADMIN — PROPOFOL 160 MCG/KG/MIN: 10 INJECTION, EMULSION INTRAVENOUS at 08:44

## 2017-12-08 RX ADMIN — SODIUM CHLORIDE, POTASSIUM CHLORIDE, SODIUM LACTATE AND CALCIUM CHLORIDE 9 ML/HR: 600; 310; 30; 20 INJECTION, SOLUTION INTRAVENOUS at 08:00

## 2017-12-08 RX ADMIN — OXYCODONE HYDROCHLORIDE AND ACETAMINOPHEN 1 TABLET: 5; 325 TABLET ORAL at 10:13

## 2017-12-08 RX ADMIN — Medication 1 MG: at 08:37

## 2017-12-08 RX ADMIN — CEFAZOLIN SODIUM 2 G: 2 INJECTION, SOLUTION INTRAVENOUS at 08:44

## 2017-12-08 RX ADMIN — FAMOTIDINE 20 MG: 10 INJECTION, SOLUTION INTRAVENOUS at 08:40

## 2017-12-08 NOTE — ANESTHESIA POSTPROCEDURE EVALUATION
Patient: Silas Haines    Procedure Summary     Date Anesthesia Start Anesthesia Stop Room / Location    12/08/17 0841 0934  [facility]  [facility]       Procedure Diagnosis Surgeon Provider    INGUINAL HERNIA REPAIR (Left Groin) Left inguinal hernia  (Left inguinal hernia [K40.90]) MD Justo Guevara MD          Anesthesia Type: MAC  Last vitals  BP   133/72 (12/08/17 0950)   Temp   36.7 °C (98.1 °F) (12/08/17 0741)   Pulse   56 (12/08/17 0950)   Resp   16 (12/08/17 0950)     SpO2   99 % (12/08/17 0950)     Post Anesthesia Care and Evaluation    Patient location during evaluation: bedside  Patient participation: complete - patient participated  Level of consciousness: awake and alert  Pain management: adequate  Airway patency: patent  Anesthetic complications: No anesthetic complications    Cardiovascular status: acceptable  Respiratory status: acceptable  Hydration status: acceptable    Comments: /72 (BP Location: Right arm, Patient Position: Lying)  Pulse 56  Temp 36.7 °C (98.1 °F) (Oral)   Resp 16  SpO2 99%

## 2017-12-08 NOTE — PLAN OF CARE
Problem: Perioperative Period (Adult)  Goal: Signs and Symptoms of Listed Potential Problems Will be Absent or Manageable (Perioperative Period)  Outcome: Outcome(s) achieved Date Met:  12/08/17 12/08/17 1009   Perioperative Period   Problems Assessed (Perioperative Period) all   Problems Present (Perioperative Period) none

## 2017-12-08 NOTE — OP NOTE
PREOPERATIVE DIAGNOSIS:  Left inguinal hernia    POSTOPERATIVE DIAGNOSIS (FINDINGS):  Indirect left inguinal hernia    PROCEDURE:  Open left inguinal hernia repair with Trung technique    SURGEON:  Jonathon Peter MD    ASSISTANT:  Krystal Hong    ANESTHESIA:  Monitored sedation    EBL:  Minimal    SPECIMEN(S):  none    DESCRIPTION:  In supine position under sedation prepped and draped usual sterile manner.  Half percent Marcaine with epinephrine infiltrated locally.  Oblique incision made and carried to and through the external oblique fascia.  Spermatic cord skeletonized and a moderately large indirect hernia sac was dissected free from the cord structures and well past the internal ring.  It was clamped and divided and suture ligated with 0 Ethibond.  The inguinal floor was repaired with a large keyhole polypropylene mesh which was sutured inferiorly to the shelving edge of Poupart ligament with interrupted 0 Ethibond.  The tails were sutured lateral to the cord with 0 Ethibond.  Medially and superiorly to the conjoined aponeurosis with 0 Ethibond.  Good flat apposition of the mesh to the inguinal floor was achieved and good hemostasis was noted.  External oblique was closed with running 3-0 Vicryl.  Quintin's running 3-0 Vicryl.  Skin running 5-0 Vicryl subcuticular followed by Dermabond.  Tolerated well.    Jonathon Peter M.D.

## 2017-12-08 NOTE — DISCHARGE INSTRUCTIONS
Dr. Jonathon Peter  [address]  [place] [id number] [phone]    Discharge Instructions for Hernia Surgery      1. Go home, rest and take it easy today; however, you should get up and move about several times today to reduce the risk of developing a clot in your legs.      2. You may experience some dizziness or memory loss from the anesthesia.  This may last for the next 24 hours.  Someone should plan on staying with you for the first 24 hours for your safety.    3. Do not make any important legal decisions or sign any legal papers for the next 24 hours.      4. Eat and drink lightly today.  Start off with liquids, jello, soup, crackers or other bland foods at first. You may advance your diet tomorrow as tolerated as long as you do not experience any nausea or vomiting.     5. If skin glue (Dermabond) was used, your incisions are protected and covered.  The invisible glue will dissolve on its own as your incision heals. If dressings were used, you may remove your outer dressings in 3 days.  The white tapes called steri-strips should stay in place.  They will fall off on their own in 1-2 weeks.  Do not worry if they come off sooner.      6. If dressings were used, you may notice some bleeding/drainage on your outer dressings. A little bloody drainage is normal. If the bleeding/drainage is such that the bandage cannot absorb it, remove the dressing, apply clean gauze and apply firm pressure for a full 15 minutes.  If the bleeding continues, please call me.    7. You may shower tomorrow allowing water to run over the incisions; however, do not scrub the incisions.  No tub baths until your incisions are completely healed.      8. No lifting > 20 lbs. until you are seen at your follow-up visit.         9. You have received a prescription for a narcotic pain medicine, as you will have some pain following surgery.   You will not be totally pain free, but your pain medicine should make the pain  tolerable.  Please take your pain medicine as prescribed and always take your pills with food to prevent nausea. If you are having severe pain that cannot be controlled by the pain medicine, please contact me.      10. You have also received a prescription for an anti-nausea medicine.  Please take this as prescribed for any nausea or vomiting.  Nausea could be a result of the anesthesia or a result of the narcotic pain medicine.  If you experience severe nausea and vomiting that cannot be controlled by the nausea medicine, please call me.      11. If you had a laparoscopic surgery, it is not unusual to experience pain/discomfort in your shoulders or under your ribs after surgery.  It is from the gas used during the laparoscopic procedure and usually lasts 1-3 days.  The prescription pain medicine is used to treat the surgical pain and does not typically alleviate this “gassy” pain.     12. No driving for 24 hours and for as long as you are taking your prescription pain medicine.              13. You will need to call the office at [phone] to schedule a follow-up appointment in 6-10 days.     14. Remember to contact me for any of the following:    • Fever > 101 degrees  • Severe pain that cannot be controlled by taking your pain pills  • Severe nausea or vomiting that cannot be controlled by taking your nausea pills  • Significant bleeding of your incisions  • Drainage that has a bad smell or is yellow or green in appearance  • Any other questions or concerns        Additional Instruction for Inguinal Hernia Patients Only    1. If you did not urinate at the hospital after your surgery or if you feel the need to urinate and cannot, this will necessitate a return to the Emergency Room for placement of a urinary catheter.  You should also notify me as well.  As a rule, you should be able to empty your bladder within 4-6 hours after discharge from the hospital.      2. You may notice some scrotal bruising and/or  swelling. A scrotal support or briefs as well as ice packs may be used to alleviate discomfort.

## 2017-12-08 NOTE — ANESTHESIA PREPROCEDURE EVALUATION
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 MAC and possible awareness intra op.  Pt understands MAC and GA are not the same and the possibility of GA being required for failed MAC      Pt denies Hx of PONV)  intravenous induction   Anesthetic plan and risks discussed with patient.

## 2017-12-08 NOTE — PLAN OF CARE
Problem: Patient Care Overview (Adult)  Goal: Plan of Care Review  Outcome: Ongoing (interventions implemented as appropriate)    12/08/17 0723   Coping/Psychosocial Response Interventions   Plan Of Care Reviewed With patient   Patient Care Overview   Progress improving       Goal: Discharge Needs Assessment  Outcome: Ongoing (interventions implemented as appropriate)    12/08/17 0723   Discharge Needs Assessment   Concerns To Be Addressed denies needs/concerns at this time         Problem: Perioperative Period (Adult)  Goal: Signs and Symptoms of Listed Potential Problems Will be Absent or Manageable (Perioperative Period)  Outcome: Ongoing (interventions implemented as appropriate)    12/08/17 0723   Perioperative Period   Problems Assessed (Perioperative Period) all

## 2017-12-14 ENCOUNTER — OFFICE VISIT (OUTPATIENT)
Dept: SURGERY | Facility: CLINIC | Age: 82
End: 2017-12-14

## 2017-12-14 DIAGNOSIS — Z09 FOLLOW UP: Primary | ICD-10-CM

## 2017-12-14 PROCEDURE — 99024 POSTOP FOLLOW-UP VISIT: CPT | Performed by: SURGERY

## 2017-12-16 NOTE — PROGRESS NOTES
Open left inguinal hernia repair 12/8/2017    Incision is healing well and he has the expected amount of swelling.  Discussed activity restrictions, return as needed.

## 2017-12-26 RX ORDER — ATORVASTATIN CALCIUM 20 MG/1
TABLET, FILM COATED ORAL
Qty: 90 TABLET | Refills: 0 | Status: SHIPPED | OUTPATIENT
Start: 2017-12-26 | End: 2018-03-27 | Stop reason: SDUPTHER

## 2018-03-27 RX ORDER — ATORVASTATIN CALCIUM 20 MG/1
TABLET, FILM COATED ORAL
Qty: 90 TABLET | Refills: 0 | Status: SHIPPED | OUTPATIENT
Start: 2018-03-27 | End: 2018-08-29 | Stop reason: SDUPTHER

## 2018-08-29 ENCOUNTER — OFFICE VISIT (OUTPATIENT)
Dept: INTERNAL MEDICINE | Facility: CLINIC | Age: 83
End: 2018-08-29

## 2018-08-29 VITALS
WEIGHT: 160 LBS | BODY MASS INDEX: 21.67 KG/M2 | OXYGEN SATURATION: 99 % | DIASTOLIC BLOOD PRESSURE: 83 MMHG | SYSTOLIC BLOOD PRESSURE: 168 MMHG | HEART RATE: 66 BPM | TEMPERATURE: 97.4 F | HEIGHT: 72 IN

## 2018-08-29 DIAGNOSIS — I25.10 CHRONIC CORONARY ARTERY DISEASE: Primary | ICD-10-CM

## 2018-08-29 DIAGNOSIS — C90.00 MULTIPLE MYELOMA NOT HAVING ACHIEVED REMISSION (HCC): ICD-10-CM

## 2018-08-29 DIAGNOSIS — I10 ESSENTIAL HYPERTENSION: ICD-10-CM

## 2018-08-29 DIAGNOSIS — E78.00 HYPERCHOLESTEROLEMIA: ICD-10-CM

## 2018-08-29 PROCEDURE — 99214 OFFICE O/P EST MOD 30 MIN: CPT | Performed by: INTERNAL MEDICINE

## 2018-08-29 RX ORDER — ATORVASTATIN CALCIUM 20 MG/1
20 TABLET, FILM COATED ORAL DAILY
Qty: 90 TABLET | Refills: 3 | Status: SHIPPED | OUTPATIENT
Start: 2018-08-29 | End: 2019-05-07

## 2018-08-29 RX ORDER — LOSARTAN POTASSIUM 50 MG/1
50 TABLET ORAL DAILY
Qty: 90 TABLET | Refills: 3 | Status: SHIPPED | OUTPATIENT
Start: 2018-08-29 | End: 2019-03-06 | Stop reason: SINTOL

## 2018-08-29 RX ORDER — DOCUSATE SODIUM 100 MG/1
100 CAPSULE, LIQUID FILLED ORAL DAILY PRN
COMMUNITY
End: 2019-08-23

## 2018-08-30 LAB
ALBUMIN SERPL-MCNC: 4.2 G/DL (ref 3.5–5.2)
ALBUMIN/GLOB SERPL: 1 G/DL
ALP SERPL-CCNC: 75 U/L (ref 39–117)
ALT SERPL-CCNC: 17 U/L (ref 1–41)
APPEARANCE UR: CLEAR
AST SERPL-CCNC: 18 U/L (ref 1–40)
BACTERIA #/AREA URNS HPF: NORMAL /HPF
BASOPHILS # BLD AUTO: 0.01 10*3/MM3 (ref 0–0.2)
BASOPHILS NFR BLD AUTO: 0.2 % (ref 0–1.5)
BILIRUB SERPL-MCNC: 0.6 MG/DL (ref 0.1–1.2)
BILIRUB UR QL STRIP: NEGATIVE
BUN SERPL-MCNC: 24 MG/DL (ref 8–23)
BUN/CREAT SERPL: 20 (ref 7–25)
CALCIUM SERPL-MCNC: 9.2 MG/DL (ref 8.6–10.5)
CASTS URNS MICRO: NORMAL
CHLORIDE SERPL-SCNC: 102 MMOL/L (ref 98–107)
CO2 SERPL-SCNC: 27.3 MMOL/L (ref 22–29)
COLOR UR: YELLOW
CREAT SERPL-MCNC: 1.2 MG/DL (ref 0.76–1.27)
EOSINOPHIL # BLD AUTO: 0.05 10*3/MM3 (ref 0–0.7)
EOSINOPHIL NFR BLD AUTO: 1.2 % (ref 0.3–6.2)
EPI CELLS #/AREA URNS HPF: NORMAL /HPF
ERYTHROCYTE [DISTWIDTH] IN BLOOD BY AUTOMATED COUNT: 16.8 % (ref 11.5–14.5)
GLOBULIN SER CALC-MCNC: 4.2 GM/DL
GLUCOSE SERPL-MCNC: 93 MG/DL (ref 65–99)
GLUCOSE UR QL: NEGATIVE
HCT VFR BLD AUTO: 33.9 % (ref 40.4–52.2)
HGB BLD-MCNC: 11 G/DL (ref 13.7–17.6)
HGB UR QL STRIP: (no result)
IMM GRANULOCYTES # BLD: 0 10*3/MM3 (ref 0–0.03)
IMM GRANULOCYTES NFR BLD: 0 % (ref 0–0.5)
KETONES UR QL STRIP: NEGATIVE
LEUKOCYTE ESTERASE UR QL STRIP: NEGATIVE
LYMPHOCYTES # BLD AUTO: 1.47 10*3/MM3 (ref 0.9–4.8)
LYMPHOCYTES NFR BLD AUTO: 36.7 % (ref 19.6–45.3)
MCH RBC QN AUTO: 33.3 PG (ref 27–32.7)
MCHC RBC AUTO-ENTMCNC: 32.4 G/DL (ref 32.6–36.4)
MCV RBC AUTO: 102.7 FL (ref 79.8–96.2)
MONOCYTES # BLD AUTO: 0.34 10*3/MM3 (ref 0.2–1.2)
MONOCYTES NFR BLD AUTO: 8.5 % (ref 5–12)
NEUTROPHILS # BLD AUTO: 2.14 10*3/MM3 (ref 1.9–8.1)
NEUTROPHILS NFR BLD AUTO: 53.4 % (ref 42.7–76)
NITRITE UR QL STRIP: NEGATIVE
PH UR STRIP: 7 [PH] (ref 5–8)
PLATELET # BLD AUTO: 196 10*3/MM3 (ref 140–500)
POTASSIUM SERPL-SCNC: 4.4 MMOL/L (ref 3.5–5.2)
PROT SERPL-MCNC: 8.4 G/DL (ref 6–8.5)
PROT UR QL STRIP: (no result)
PSA SERPL-MCNC: <0.014 NG/ML (ref 0–4)
RBC # BLD AUTO: 3.3 10*6/MM3 (ref 4.6–6)
RBC #/AREA URNS HPF: NORMAL /HPF
SODIUM SERPL-SCNC: 136 MMOL/L (ref 136–145)
SP GR UR: 1.01 (ref 1–1.03)
T4 FREE SERPL-MCNC: 1.56 NG/DL (ref 0.93–1.7)
TSH SERPL DL<=0.005 MIU/L-ACNC: 1.67 MIU/ML (ref 0.27–4.2)
UROBILINOGEN UR STRIP-MCNC: (no result) MG/DL
WBC # BLD AUTO: 4.01 10*3/MM3 (ref 4.5–10.7)
WBC #/AREA URNS HPF: NORMAL /HPF

## 2018-09-12 NOTE — PROGRESS NOTES
Subjective   Silas Haines is a 83 y.o. male.   He is here today for chronic CAD hyperlipidemia hypertension and multiple myeloma  History of Present Illness   He is here today for chronic CAD which is stable on current medication hyper lipidemia which is stable on current medication hypertension which is normally well controlled on current medication but not today and multiple myeloma without remission which is not necessarily stable and followed by oncology  The following portions of the patient's history were reviewed and updated as appropriate: allergies, current medications, past family history, past medical history, past social history, past surgical history and problem list.    Review of Systems   All other systems reviewed and are negative.      Objective   Physical Exam   Constitutional: He is oriented to person, place, and time. He appears well-developed and well-nourished. He is cooperative.   HENT:   Head: Normocephalic and atraumatic.   Right Ear: Hearing, tympanic membrane, external ear and ear canal normal.   Left Ear: Hearing, tympanic membrane, external ear and ear canal normal.   Nose: Nose normal.   Mouth/Throat: Uvula is midline, oropharynx is clear and moist and mucous membranes are normal.   Eyes: Pupils are equal, round, and reactive to light. Conjunctivae, EOM and lids are normal.   Neck: Phonation normal. Neck supple. Carotid bruit is not present.   Cardiovascular: Normal rate, regular rhythm and normal heart sounds.  Exam reveals no gallop and no friction rub.    No murmur heard.  Pulmonary/Chest: Effort normal and breath sounds normal. No respiratory distress.   Abdominal: Soft. Bowel sounds are normal. He exhibits no distension and no mass. There is no hepatosplenomegaly. There is no tenderness. There is no rebound and no guarding. No hernia.   Musculoskeletal: He exhibits no edema.   Neurological: He is alert and oriented to person, place, and time. Coordination and gait normal.    Skin: Skin is warm and dry.   Psychiatric: He has a normal mood and affect. His speech is normal and behavior is normal. Judgment and thought content normal.   Nursing note and vitals reviewed.      Assessment/Plan   Diagnoses and all orders for this visit:    Chronic coronary artery disease    Hypercholesterolemia  -     Cancel: Lipid Panel With LDL / HDL Ratio  -     T4, Free  -     TSH  -     Urinalysis With Microscopic - Urine, Clean Catch  -     Comprehensive Metabolic Panel  -     CBC & Differential    Essential hypertension  -     PSA DIAGNOSTIC    Multiple myeloma not having achieved remission (CMS/HCC)    Other orders  -     atorvastatin (LIPITOR) 20 MG tablet; Take 1 tablet by mouth Daily.  -     losartan (COZAAR) 50 MG tablet; Take 1 tablet by mouth Daily.  -     Microscopic Examination        Chronic CAD stable on current medication with no changes needed at this time  Hyperlipidemia has been stable on current medications and we will follow closely  Hypertension not well-controlled and we will provide losartan 50 mg daily  Multiple myeloma not having achieved remission not stable but not worsening either and he will follow with hematology oncology for this as well

## 2018-10-16 ENCOUNTER — CLINICAL SUPPORT (OUTPATIENT)
Dept: INTERNAL MEDICINE | Facility: CLINIC | Age: 83
End: 2018-10-16

## 2018-10-16 DIAGNOSIS — Z23 FLU VACCINE NEED: Primary | ICD-10-CM

## 2018-10-16 PROCEDURE — 90662 IIV NO PRSV INCREASED AG IM: CPT | Performed by: INTERNAL MEDICINE

## 2018-10-16 PROCEDURE — G0008 ADMIN INFLUENZA VIRUS VAC: HCPCS | Performed by: INTERNAL MEDICINE

## 2018-12-28 ENCOUNTER — TRANSCRIBE ORDERS (OUTPATIENT)
Dept: SPEECH THERAPY | Facility: HOSPITAL | Age: 83
End: 2018-12-28

## 2018-12-28 DIAGNOSIS — R47.01 EXPRESSIVE APHASIA: Primary | ICD-10-CM

## 2019-01-02 ENCOUNTER — HOSPITAL ENCOUNTER (OUTPATIENT)
Dept: SPEECH THERAPY | Facility: HOSPITAL | Age: 84
Setting detail: THERAPIES SERIES
Discharge: HOME | End: 2019-01-02

## 2019-01-02 DIAGNOSIS — C71.1 GLIOBLASTOMA MULTIFORME OF FRONTAL LOBE (HCC): ICD-10-CM

## 2019-01-02 DIAGNOSIS — R47.01 APHASIA: Primary | ICD-10-CM

## 2019-01-02 PROCEDURE — 96105 ASSESSMENT OF APHASIA: CPT | Performed by: SPEECH-LANGUAGE PATHOLOGIST

## 2019-01-02 NOTE — THERAPY EVALUATION
Outpatient Speech Language Pathology   Adult Speech Language Cognitive Initial Evaluation  [facility]     Patient Name: Silas Haines  : 1935  MRN: [id number]  Today's Date: 2019        Visit Date: 2019   Patient Active Problem List   Diagnosis   • Chronic coronary artery disease   • Hypertension   • Hypercholesterolemia   • Multiple myeloma (CMS/HCC)   • Postherpetic neuralgia   • Shingles (herpes zoster) polyneuropathy   • Herpes zoster   • Squamous cell carcinoma   • Thoracic root lesion   • Squamous cell carcinoma of skin of scalp   • Cold intolerance   • Impacted cerumen of right ear   • Edema of extremities   • Healthcare-associated pneumonia   • Personal history of other diseases of the circulatory system   • Normocytic normochromic anemia   • Inflammatory pseudotumor of orbit   • Periorbital cellulitis   • Malignant neoplasm of prostate (CMS/HCC)   • Left inguinal hernia        Past Medical History:   Diagnosis Date   • Anemia    • Bladder cancer (CMS/HCC)     high grade treated with BCG x 6 and surveillance cystoscopies (Dr. Michael Berg)   • Blind left eye    • CAD (coronary artery disease)     CABG    X 2 VESSEL   • History of shingles 10/2015   • History of skin cancer 2016    SQUAMOUS CELL SCALP WITH RADIATION   • Hyperlipidemia    • Hypertension    • Kidney stones    • Multiple myeloma (CMS/HCC) 2014    taking po chemotherapy   • Neuralgia of flank, left 02/10/2015    FROM SHINGLES   • Osteoporosis    • Pancreatic cancer (CMS/HCC)    • PONV (postoperative nausea and vomiting)    • Prostate cancer (CMS/HCC)     treated with 1 dose of Lupron and intraprostatic radiation         Past Surgical History:   Procedure Laterality Date   • BLADDER SURGERY N/A     x3   • BONE MARROW BIOPSY  2014   • CATARACT EXTRACTION Bilateral     LT 2015    RT 3-3-2015   • CORONARY ARTERY BYPASS GRAFT  06/27/2003    x2   • CYSTOSCOPY N/A 2011     "for surveillance of bladder cancer, Dr. Michael Berg   • EYE SURGERY     • FRACTURE SURGERY     • HIP PINNING Right 07/12/2017    Dr. Donavan Delgadillo   • INGUINAL HERNIA REPAIR Left 12/8/2017    Procedure: INGUINAL HERNIA REPAIR;  Surgeon: Jonathon Peter MD;  [place] [facility] [place] [facility] [place]  Service:    • MOHS SURGERY  08/2016    top of head with skin graft from left arm   • PILONIDAL CYST DRAINAGE  1956   • PROSTATE RADIOACTIVE SEED IMPLANT N/A 01/2003    Prostate \"seeding implant\"         Visit Dx:    ICD-10-CM ICD-9-CM   1. Aphasia R47.01 784.3   2. Glioblastoma multiforme of frontal lobe (CMS/HCC) C71.1 191.1       Patient History     Row Name 01/02/19 1400             History    Chief Complaint  Other 1 (comment) word finding difficulty   -      Date Current Problem(s) Began  08/07/18  -KA      Brief Description of Current Complaint  see history below  -KA      Patient/Caregiver Goals  Other (comment) see goals below  -KA      Patient's Rating of General Health  Very good  -KA      Hand Dominance  right-handed  -KA      Occupation/sports/leisure activities  Patient has active lifestyle, including plays the Harmonica and belongs to an organization in [place]. Patient also reports he is active in his Muslim, part of choir and was previously maintenance property chair.   -KA      Patient seeing anyone else for problem(s)?  No  -KA      What clinical tests have you had for this problem?  MRI;CT scan;X-ray  -KA      Results of Clinical Tests  see history  -KA      Related/Recent Hospitalizations  Yes  -KA      Surgery/Hospitalization  Hospitalized at [facility] 10/25 to 10/27 for biospy of left frontal mass,   -KA      History of Previous Related Injuries  History of bladder cancer, prostate cancer, and multiple myeloma  -KA        User Key  (r) = Recorded By, (t) = Taken By, (c) = Cosigned By    Initials Name Provider Type    Flaco Brody MA,CCC-SLP Speech and Language Pathologist          SLP [facility] " "Evaluation - 01/02/19 1400        Communication Assessment/Intervention    Document Type  evaluation   -KA    Subjective Information  no complaints   -KA    Patient Observations  alert;cooperative   -KA    Patient Effort  excellent   -KA    Symptoms Noted During/After Treatment  none   -KA       General Information    Patient Profile Reviewed  yes   -KA    Pertinent History Of Current Problem  Patient is referred to outpatient speech therapy for expressive aphasia. Patient reports starting on August 7th 2018 he noticied word finding difficulty after being outside and doing lawn work. He was hospitalized at [facility] and imaging showed a left frontal brain lesion concerning for ischemic lesion vs neoplasm. Patient underwent biopsy end of October with pathology consistent with glioblastoma. Patient reports he has completed 15 radiation treatments and also underwent chemotherapy that was finished 12/28/18. He reports since his symptoms started his August, he has difficulty \"talking and finding the correct words.\" He also reports difficulty with reading comprehension and stated \" I have difficulty making sense of what I am reading.\" Patient also reports memory is worse. Patient reports no change (worse or better) of his symptoms since his radiation treatment. He reports he has upcoming appointment with his Neurologist for f/u.    -KA    Precautions/Limitations, Vision  vision impairment, left;other (see comments) history of left eye blindness    history of left eye blindness  -KA    Precautions/Limitations, Hearing  hearing impairment, bilaterally   -KA    Prior Level of Function-Communication  WFL   -KA    Plans/Goals Discussed with  patient;spouse/S.O.;agreed upon   -KA    Barriers to Rehab  none identified   -KA    Patient's Goals for Discharge  functional communication   -KA    Family Goals for Discharge  functional communication   -KA    Standardized Assessment Used  Western Aphasia Battery   -KA       Oral Motor " Structure and Function    Oral Motor Structure and Function  WFL   -KA    Dentition Assessment  natural, present and adequate   -KA    Mucosal Quality  moist, healthy   -KA       Oral Musculature and Cranial Nerve Assessment    Oral Motor General Assessment  lingual impairment;oral labial or buccal impairment   -KA    Oral Labial or Buccal Impairment, Detail, Cranial Nerve VII (Facial):  right labial droop;other (see comments) very mild    very mild  -KA    Lingual Impairment, Detail. Cranial Nerves IX, XII (Glossopharyngeal and Hypoglossal)  reduced strength right;other (see comments) slight right deviation     slight right deviation   -KA    Oral Motor, Comment  Patient demonstrates slight, very minimal right labial and lingual weakness, does not impact speech intelligibility   -KA       Standardized Tests    Formal Speech Language Tests  WAB: Western Aphasia Battery   -KA       Spontaneous Speech    Information Content  9   -KA    Fluency  9   -KA    Spontaneous Speech Total  18   -KA       Comprehension    Yes/No Questions  60   -KA    Auditory Word Recongition  60   -KA    Sequential Commands  80   -KA    Comprehension Total  200   -KA       Repetition    Repetition Total  94   -KA       Naming    Object  60   -KA    Word Fluency  9   -KA    Sentence Completion  10   -KA    Responsive Speech  10   -KA    Naming Total  89   -KA       Aphasia    Aphasia Quotient  92.6   -KA    Aphasia Severity  Very Mild (>90)   -KA       Cortical    WAB Comments  Overall patient demonstrated very mild expressive and receptive aphasia. During spontaneous conversation including describing background and medical history, he demonstrated the ability to functionally generate and converse at the sentence to multi-sentence level, occasional pause, delay or hesitiations. At times oral motor apraxia appeared evident at the conversation level with groping and difficulty with sequential movement of speech/articulators, however patient  performed well on repetition portion of WAB 94 out of 100. SLP will continue assessment for apraxia. During auditory comprehension of complex y/n questions and multi-step commands he did require a repetition and increased processing speed. During word fluency task moderate impairment with naming 9 items in concrete category within 60 seconds. During reading comprehension portion of reading short phrases/sentences/paragraph and selecting which word in F: 4 to complete ending patient performed with 100%. SLP will continue diagnostic assessment of patient reading and writing skills. SLP will also assess cognitive skills.    -KA       SLP Clinical Impressions    SLP Diagnosis  mild global aphasia   -KA    Rehab Potential/Prognosis  good   -KA    [facility] Criteria for Skilled Therapy Interventions Met  yes   -KA    Functional Impact  difficulty communicating wants, needs;difficulty communicating in an emergency;difficulty in expressing complex messages;restrictions in personal and social life   -KA       Recommendations    Therapy Frequency (SLP [facility])  2 days per week   -KA    Predicted Duration Therapy Intervention (Days)  until discharge   -KA    Anticipated Dischage Disposition  home with assist   -KA      User Key  (r) = Recorded By, (t) = Taken By, (c) = Cosigned By    Initials Name Provider Type    Flaco Brody MA,CCC-SLP Speech and Language Pathologist                         OP SLP Education     Row Name 01/02/19 1508       Education    Barriers to Learning  No barriers identified  -KA    Education Provided  Described results of evaluation;Patient expressed understanding of evaluation;Family/caregivers expressed understanding of evaluation  -KA    Assessed  Learning needs;Learning motivation  -MARIELA    Learning Motivation  Strong  -MARIELA    Learning Method  Explanation  -KA    Teaching Response  Verbalized understanding  -KA      User Key  (r) = Recorded By, (t) = Taken By, (c) = Cosigned By    Initials Name Effective  Dates    Flaco Brody MA,[facility]-SLP 06/08/18 -           SLP OP Goals     Row Name 01/02/19 1500          Goal Type Needed    Goal Type Needed  Apraxia;Written Language Comprehension;Verbal Expression  -KA        Subjective Pain    Able to rate subjective pain?  yes  -KA     Pre-Treatment Pain Level  0  -KA     Post-Treatment Pain Level  0  -KA        Written Language Comprehension Goals    Written Language Comprehension LTG's  Patient will be able to comprehend written material in social/avocational/work setting  -KA     Patient will be able to comprehend written material in social/avocational/work setting  90%:;without cues  -KA     Status: Patient will be able to comprehend written material in social/avocational/work setting  New  -KA     Written Language Comprehension STG's  Patient will improve comprehension of written language skills by reading short paragraphs and correctly answering written Yes/No questions  -KA     Patient will improve comprehension of written language skills by reading short paragraphs and correctly answering written Yes/No questions  90%:;without cues  -KA     Status: Patient will improve comprehension of written language skills by reading short paragraphs and correctly answering written Yes/No questions  New  -KA        Verbal Expression Goals    Verbal Expression LTG's  Patient will be able to use verbal expressive language skills to communicate effectively in social/avocational/work setting  -KA     Patient will be able to use verbal expressive language skills to communicate effectively in social/avocational/work setting  90%:;without cues  -KA     Status: Patient will be able to use verbal expressive language skills to communicate effectively in social/avocational/work setting  New  -KA     Verbal Expression STG's  Patient will improve verbal expressive language skills by providing multiple definitions/meanings when given a word;Patient will improve verbal expressive language  skills by describing single/multiple similarities and differences between two target items;Patient will improve verbal expressive language skills by completing divergent naming tasks;Patient will improve verbal expressive language skills by describing attributes, function, action and/or uses of an object/item;Patient will improve verbal expressive language skills by expressing complex concepts and ideas  -KA     Patient will improve verbal expressive language skills by providing multiple definitions/meanings when given a word  90%:;without cues  -KA     Status: Patient will improve verbal expressive language skills by providing multiple definitions/meanings when given a word  New  -KA     Patient will improve verbal expressive language skills by describing single/multiple similarities and differences between two target items  90%:;without cues  -KA     Status: Patient will improve verbal expressive language skills by describing single/multiple similarities and differences between two target items  New  -KA     Patient will improve verbal expressive language skills by completing divergent naming tasks  90%:;without cues  -KA     Status: Patient will improve verbal expressive language skills by completing divergent naming tasks  New  -KA     Patient will improve verbal expressive language skills by describing attributes, function, action and/or uses of an object/item  90%:;without cues  -KA     Status: Patient will improve verbal expressive language skills by describing attributes, function, action and/or uses of an object/item  New  -KA     Patient will improve verbal expressive language skills by expressing complex concepts and ideas  90%:;without cues  -KA     Status: Patient will improve verbal expressive language skills by expressing complex concepts and ideas  New  -KA        Apraxia Goals    Apraxia LTG's  Patient will use verbal speech that is perceived as smooth and natural-sounding by familiar/unfamiliar  listeners  -KA     Patient will use verbal speech that is perceived as smooth and natural-sounding by familiar/unfamiliar listeners  90%:;without cues  -KA     Status: Patient will use verbal speech that is perceived as smooth and natural-sounding by familiar/unfamiliar listeners  New  -KA     Apraxia STG's  Patient will reduce impact of apraxia on verbal speech by repeating/reading words of increasing length and complexity  -KA     Patient will reduce impact of apraxia on verbal speech by repeating/reading words of increasing length and complexity  90%:;without cues  -KA     Status: Patient will reduce impact of apraxia on verbal speech by repeating/reading words of increasing length and complexity  New  -KA       User Key  (r) = Recorded By, (t) = Taken By, (c) = Cosigned By    Initials Name Provider Type    Flaco Brody MA,CCC-SLP Speech and Language Pathologist          OP SLP Assessment/Plan - 01/02/19 1503        SLP Assessment    Functional Problems  Speech Language- Adult/Cognition   -KA    Impact on Function: Adult Speech Language/Cognition  Difficulty communicating wants, needs, and ideas;Restrictions in personal and social life;Difficulty communicating in a medical emergency;Difficulty sequencing thoughts to express complex messages;Difficulty participating in avocational activities   -KA    Clinical Impression: Speech Language-Adult/Congnition  Mild:;Expressive Aphasia;Receptive Aphasia   -KA    Please refer to paper survey for additional self-reported information  Yes   -KA    Please refer to items scanned into chart for additional diagnostic informaiton and handouts as provided by clinician  Yes   -KA    Prognosis  Good (comment)   -KA    Patient would benefit from skilled therapy intervention  Yes   -KA       SLP Plan    Frequency  -- 2x a week    2x a week  -KA    Duration  -- 8 weeks    8 weeks  -KA    Planned CPT's?  SLP INDIVIDUAL SPEECH THERAPY: 42046   -KA    Expected Duration Therapy  Session - minutes  45-60 minutes   -MARIELA      User Key  (r) = Recorded By, (t) = Taken By, (c) = Cosigned By    Initials Name Provider Type    Flaco Brody MA,CCC-SLP Speech and Language Pathologist             SLP Outcome Measures (last 72 hours)      SLP Outcome Measures     Row Name 01/02/19 1500             SLP Outcome Measures    Outcome Measure Used?  Adult NOMS  -KA         Adult FCM Scores    FCM Chosen  Verbal Expression  -MARIELA      Verbal Expression FCM Score  5  -KA        User Key  (r) = Recorded By, (t) = Taken By, (c) = Cosigned By    Initials Name Effective Dates    Flaco Brody MA,CCC-SLP 06/08/18 -       14:00-15:00       Time Calculation:   SLP Start Time: 1100  SLP Stop Time: 1200  SLP Time Calculation (min): 60 min    Therapy Charges for Today     Code Description Service Date Service Provider Modifiers Qty    47588669043 HC ST SPOKEN [employer]ANG EXPRESS CURRENT 1/2/2019 Flaco Carrero MA,CCC-SLP  1    06558632615 HC ST SPO[employer] PROJECTED 1/2/2019 Flaco Carrero MA,CCC-SLP  1    74152450437 HC ST ASSESSMENT OF APHASIA PER HOUR 1/2/2019 Flaco Carrero MA,CCC-SLP GN 2          SLP G-Codes  SLP NOMS Used?: Yes  Functional Limitations: Spoken language expressive  Spoken Language Expression Current Status (): At least 20 percent but less than 40 percent impaired, limited or restricted  Spoken Language Expression Goal Status (): At least 1 percent but less than 20 percent impaired, limited or restricted        Flaco Carrero MA,CCC-SLP  1/2/2019

## 2019-01-04 ENCOUNTER — HOSPITAL ENCOUNTER (OUTPATIENT)
Dept: SPEECH THERAPY | Facility: HOSPITAL | Age: 84
Setting detail: THERAPIES SERIES
Discharge: HOME | End: 2019-01-04

## 2019-01-04 DIAGNOSIS — C71.1 GLIOBLASTOMA MULTIFORME OF FRONTAL LOBE (HCC): ICD-10-CM

## 2019-01-04 DIAGNOSIS — R47.01 APHASIA: Primary | ICD-10-CM

## 2019-01-04 PROCEDURE — 92507 TX SP LANG VOICE COMM INDIV: CPT | Performed by: SPEECH-LANGUAGE PATHOLOGIST

## 2019-01-04 NOTE — THERAPY TREATMENT NOTE
Outpatient Speech Language Pathology   Adult Speech Language Cognitive Treatment Note  [facility]     Patient Name: Silas Haines  : 1935  MRN: [id number]  Today's Date: 2019         Visit Date: 2019   Patient Active Problem List   Diagnosis   • Chronic coronary artery disease   • Hypertension   • Hypercholesterolemia   • Multiple myeloma (CMS/HCC)   • Postherpetic neuralgia   • Shingles (herpes zoster) polyneuropathy   • Herpes zoster   • Squamous cell carcinoma   • Thoracic root lesion   • Squamous cell carcinoma of skin of scalp   • Cold intolerance   • Impacted cerumen of right ear   • Edema of extremities   • Healthcare-associated pneumonia   • Personal history of other diseases of the circulatory system   • Normocytic normochromic anemia   • Inflammatory pseudotumor of orbit   • Periorbital cellulitis   • Malignant neoplasm of prostate (CMS/HCC)   • Left inguinal hernia          Visit Dx:    ICD-10-CM ICD-9-CM   1. Aphasia R47.01 784.3   2. Glioblastoma multiforme of frontal lobe (CMS/HCC) C71.1 191.1       SLP SLC Evaluation - 19 1400        Communication Assessment/Intervention    Document Type  evaluation   -KA    Subjective Information  no complaints   -KA    Patient Observations  alert;cooperative   -KA    Patient Effort  excellent   -KA    Symptoms Noted During/After Treatment  none   -KA       General Information    Patient Profile Reviewed  yes   -KA    Pertinent History Of Current Problem  Patient is referred to outpatient speech therapy for expressive aphasia. Patient reports starting on 2018 he noticied word finding difficulty after being outside and doing lawn work. He was hospitalized at [facility] and imaging showed a left frontal brain lesion concerning for ischemic lesion vs neoplasm. Patient underwent biopsy end of October with pathology consistent with glioblastoma. Patient reports he has completed 15 radiation treatments and also underwent chemotherapy  "that was finished 12/28/18. He reports since his symptoms started his August, he has difficulty \"talking and finding the correct words.\" He also reports difficulty with reading comprehension and stated \" I have difficulty making sense of what I am reading.\" Patient also reports memory is worse. Patient reports no change (worse or better) of his symptoms since his radiation treatment. He reports he has upcoming appointment with his Neurologist.    -KA    Precautions/Limitations, Vision  vision impairment, left;other (see comments) history of left eye blindness    history of left eye blindness  -KA    Precautions/Limitations, Hearing  hearing impairment, bilaterally   -KA    Prior Level of Function-Communication  [facility]   -    Plans/Goals Discussed with  patient;spouse/S.O.;agreed upon   -    Barriers to Rehab  none identified   -    Patient's Goals for Discharge  functional communication   -    Family Goals for Discharge  functional communication   -    Standardized Assessment Used  Western Aphasia Battery   [facility]       Oral Motor Structure and Function    Oral Motor Structure and Function  WFL   -KA    Dentition Assessment  natural, present and adequate   -KA    Mucosal Quality  moist, healthy   -       Oral Musculature and Cranial Nerve Assessment    Oral Motor General Assessment  lingual impairment;oral labial or buccal impairment   -KA    Oral Labial or Buccal Impairment, Detail, Cranial Nerve VII (Facial):  right labial droop;other (see comments) very mild    very mild  -KA    Lingual Impairment, Detail. Cranial Nerves IX, XII (Glossopharyngeal and Hypoglossal)  reduced strength right;other (see comments) slight right deviation     slight right deviation   -    Oral Motor, Comment  Patient demonstrates slight, very minimal right labial and lingual weakness, does not impact speech intelligibility   -       Standardized Tests    Formal Speech Language Tests  WAB: Western Aphasia Battery   -       " Spontaneous Speech    Information Content  9   -KA    Fluency  9   -KA    Spontaneous Speech Total  18   -KA       Comprehension    Yes/No Questions  60   -KA    Auditory Word Recongition  60   -KA    Sequential Commands  80   -KA    Comprehension Total  200   -KA       Repetition    Repetition Total  94   -KA       Naming    Object  60   -KA    Word Fluency  9   -KA    Sentence Completion  10   -KA    Responsive Speech  10   -KA    Naming Total  89   -KA       Aphasia    Aphasia Quotient  92.6   -KA    Aphasia Severity  Very Mild (>90)   -KA       Cortical    WAB Comments  Overall patient demonstrated very mild expressive and receptive aphasia. During spontaneous conversation including describing background and medical history, he demonstrated the ability to functionally generate and converse at the sentence to multi-sentence level, occasional pause, delay or hesitiations. At times oral motor apraxia appeared evident with groping and difficulty with sequential movement of speech/articulators, however patient performed well on repetition portion of WAB 94 out of 100. SLP will continue assessment for apraxia. During auditory comprehension of complex y/n questions and multi-step commands he did require a repetition and increased processing speed. During word fluency task moderate impairment with naming 9 items in concrete category within 60 seconds. During reading comprehension portion of reading short phrases/sentences/paragraph and selecting which word in F: 4 to complete patient performed with 100%. SLP will continue diagnostic assessment of patient reading and writing skills. SLP will also assess cognitive skills.    -KA       SLP Clinical Impressions    SLP Diagnosis  mild global aphasia   -KA    Rehab Potential/Prognosis  good   -KA    [facility] Criteria for Skilled Therapy Interventions Met  yes   -KA    Functional Impact  difficulty communicating wants, needs;difficulty communicating in an emergency;difficulty in  expressing complex messages;restrictions in personal and social life   -KA       Recommendations    Therapy Frequency (SLP SLC)  2 days per week   -KA    Predicted Duration Therapy Intervention (Days)  until discharge   -KA    Anticipated Dischage Disposition  home with assist   -KA      User Key  (r) = Recorded By, (t) = Taken By, (c) = Cosigned By    Initials Name Provider Type    Flaco Brody MA,CCC-SLP Speech and Language Pathologist                        SLP OP Goals     Row Name 01/04/19 1200          Goal Type Needed    Goal Type Needed  Written Language Expression  -KA        Subjective Comments    Subjective Comments  Mr Haines is pleasant and cooperative  -KA        Subjective Pain    Able to rate subjective pain?  yes  -KA     Pre-Treatment Pain Level  0  -KA     Post-Treatment Pain Level  0  -KA        Written Language Comprehension Goals    Patient will improve comprehension of written language skills by reading short paragraphs and correctly answering written Yes/No questions  90%:;without cues  -KA     Status: Patient will improve comprehension of written language skills by reading short paragraphs and correctly answering written Yes/No questions  New  -KA     Comments: Patient will improve comprehension of written language skills by reading short paragraphs and correctly answering written Yes/No questions  diagnostic assessment of reading comprehension for detailed paragraph=50% without cues and 80% with cues, and less detailed short three paragraph 80% without cues, SLP discussed strategies with patient for reading comprehension (reading less material, picking newspaper article and highlighting key information, reading beginning paragraphs and take a break, so back and reread material highlighted etc. SLP will continue to target strategies with patient  -KA        Written Language Expression Goals    Written Language Expression LTG's  Patient will be able to complete basic writing tasks  to facilitate communication in home environment  -KA     Patient will be able to complete basic writing tasks to facilitate communication in home environment  90%:;without cues  -KA     Status: Patient will be able to complete basic writing tasks to facilitate communication in home environment  New  -KA     Written Language Expression STG's  Patient will improve written language skills by writing sentence when provided one/two key words  -KA     Patient will improve written language skills by writing sentence when provided one/two key words  90%:;with cues  -KA     Status: Patient will improve written language skills by writing sentence when provided one/two key words  New  -KA     Comments: Patient will improve written language skills by writing sentence when provided one/two key words  writing short phrases to complete sentences with pt demonstrating both spelling and grammatical errors with pt demonstrating reduced awareness. 50% without cues during diagnostic therapy, patient provided with homework to target this goal  -KA        Verbal Expression Goals    Patient will improve verbal expressive language skills by expressing complex concepts and ideas  90%:;without cues  -KA     Status: Patient will improve verbal expressive language skills by expressing complex concepts and ideas  New  -KA     Comments: Patient will improve verbal expressive language skills by expressing complex concepts and ideas  Summarizing and explaining short story read=patient's language today was more effortful, slow, with hesitiations and pauses at the multisentence to conversation level, with increased time pt able to formulate words and sentences overall 70% accuracy  -KA        Apraxia Goals    Apraxia STG's  Patient will reduce impact of apraxia on verbal speech by reading paragraph level material with;Patient will reduce impact of apraxia on verbal speech by repeating/reading phrases  -KA     Patient will reduce impact of apraxia on  verbal speech by repeating/reading phrases  90%:;without cues  -KA     Status: Patient will reduce impact of apraxia on verbal speech by repeating/reading phrases  New  -KA     Comments: Patient will reduce impact of apraxia on verbal speech by repeating/reading phrases  repeating words of increased length and short [id number]%, pt demonstrates increased difficulty with reading short phrases comprised of multisyllabic words=60% without cues  -KA     Patient will reduce impact of apraxia on verbal speech by reading paragraph level material with  90%:;with cues  -KA     Status: Patient will reduce impact of apraxia on verbal speech by reading paragraph level material with  New  -KA     Comments: Patient will reduce impact of apraxia on verbal speech by reading paragraph level material with  reading paragraph=60% without cues with words comprised of omissions and substuitions at times  -MARIELA       User Key  (r) = Recorded By, (t) = Taken By, (c) = Cosigned By    Initials Name Provider Type    Flaco Brody MA,CCC-SLP Speech and Language Pathologist                     SLP Outcome Measures (last 72 hours)      SLP Outcome Measures     Row Name 01/02/19 1500             SLP Outcome Measures    Outcome Measure Used?  Adult NOMS  -KA         Adult FCM Scores    FCM Chosen  Verbal Expression  -KA      Verbal Expression FCM Score  5  -KA        User Key  (r) = Recorded By, (t) = Taken By, (c) = Cosigned By    Initials Name Effective Dates    Flaco Brody MA,CCC-SLP 06/08/18 -              Time Calculation:   SLP Start Time: 1100  SLP Stop Time: 1200  SLP Time Calculation (min): 60 min    Therapy Charges for Today     Code Description Service Date Service Provider Modifiers Qty    30570098238  ST TREATMENT SPEECH 4 1/4/2019 Flaco Carrero MA,CCC-SLP GN 1                   Flaco Carrero MA,CCC-SLP  1/4/2019

## 2019-01-09 ENCOUNTER — HOSPITAL ENCOUNTER (OUTPATIENT)
Dept: SPEECH THERAPY | Facility: HOSPITAL | Age: 84
Setting detail: THERAPIES SERIES
Discharge: HOME | End: 2019-01-09

## 2019-01-09 DIAGNOSIS — C71.1 GLIOBLASTOMA MULTIFORME OF FRONTAL LOBE (HCC): ICD-10-CM

## 2019-01-09 DIAGNOSIS — R47.01 APHASIA: Primary | ICD-10-CM

## 2019-01-09 PROCEDURE — 92507 TX SP LANG VOICE COMM INDIV: CPT | Performed by: SPEECH-LANGUAGE PATHOLOGIST

## 2019-01-09 NOTE — THERAPY TREATMENT NOTE
Outpatient Speech Language Pathology   Adult Speech Language Cognitive Treatment Note  [facility]     Patient Name: Silas Haines  : 1935  MRN: [id number]  Today's Date: 2019         Visit Date: 2019   Patient Active Problem List   Diagnosis   • Chronic coronary artery disease   • Hypertension   • Hypercholesterolemia   • Multiple myeloma (CMS/HCC)   • Postherpetic neuralgia   • Shingles (herpes zoster) polyneuropathy   • Herpes zoster   • Squamous cell carcinoma   • Thoracic root lesion   • Squamous cell carcinoma of skin of scalp   • Cold intolerance   • Impacted cerumen of right ear   • Edema of extremities   • Healthcare-associated pneumonia   • Personal history of other diseases of the circulatory system   • Normocytic normochromic anemia   • Inflammatory pseudotumor of orbit   • Periorbital cellulitis   • Malignant neoplasm of prostate (CMS/HCC)   • Left inguinal hernia          Visit Dx:    ICD-10-CM ICD-9-CM   1. Aphasia R47.01 784.3   2. Glioblastoma multiforme of frontal lobe (CMS/HCC) C71.1 191.1                         SLP OP Goals     Row Name 19 1300          Subjective Comments    Subjective Comments  Patient is pleasant and cooperative during therapy.  -KA        Subjective Pain    Able to rate subjective pain?  yes  -KA     Pre-Treatment Pain Level  0  -KA     Post-Treatment Pain Level  0  -KA        Written Language Comprehension Goals    Patient will improve comprehension of written language skills by reading short paragraphs and correctly answering written Yes/No questions  90%:;without cues  -KA     Status: Patient will improve comprehension of written language skills by reading short paragraphs and correctly answering written Yes/No questions  Progressing as expected  -KA     Comments: Patient will improve comprehension of written language skills by reading short paragraphs and correctly answering written Yes/No questions  reading comprehension of short  multi-paragraph=90% with improvement today   -KA        Written Language Expression Goals    Patient will improve written language skills by writing sentence when provided one/two key words  90%:;with cues  -KA     Status: Patient will improve written language skills by writing sentence when provided one/two key words  Progressing as expected  -KA     Comments: Patient will improve written language skills by writing sentence when provided one/two key words  improvement today with writing setences to complete a phrase=90% without cues   -KA        Verbal Expression Goals    Patient will improve verbal expressive language skills by providing multiple definitions/meanings when given a word  90%:;without cues  -KA     Status: Patient will improve verbal expressive language skills by providing multiple definitions/meanings when given a word  -- ongoing  -KA     Comments: Patient will improve verbal expressive language skills by providing multiple definitions/meanings when given a word  70% without cues and 90% with min cues  -KA     Patient will improve verbal expressive language skills by completing divergent naming tasks  90%:;without cues  -KA     Status: Patient will improve verbal expressive language skills by completing divergent naming tasks  -- ongoing  -KA     Comments: Patient will improve verbal expressive language skills by completing divergent naming tasks  Average of 5 without cues within 60 seconds with divergent concrete category and 10 with min to mod cues  -KA     Patient will improve verbal expressive language skills by describing attributes, function, action and/or uses of an object/item  90%:;without cues  -KA     Status: Patient will improve verbal expressive language skills by describing attributes, function, action and/or uses of an object/item  -- ongoing  -KA     Comments: Patient will improve verbal expressive language skills by describing attributes, function, action and/or uses of an  object/item  70% without cues, occasional delay and hesitiation with word finding  -KA     Patient will improve verbal expressive language skills by expressing complex concepts and ideas  90%:;without cues  -KA     Status: Patient will improve verbal expressive language skills by expressing complex concepts and ideas  -- ongoing  -KA     Comments: Patient will improve verbal expressive language skills by expressing complex concepts and ideas  summarizing story read and summarizing favorite vacation to SLP=patient demonstrates inconsistent at times with thought formulation, at times with speech and language effortless with no difficulty forming words, phrases and sounds, and other times speech and language is more effortful with delays, hesitiations, oral motor groping and stuttering like speech at times. Patient reports this is how it is at home, where at times he has no difficulty and other times he does. Patient does compensate well when he does have difficulty incluging slowing down, repeating words and describing semantic features, overall accuracy is 70%.   -KA        Apraxia Goals    Patient will reduce impact of apraxia on verbal speech by repeating/reading phrases  90%:;without cues  -KA     Status: Patient will reduce impact of apraxia on verbal speech by repeating/reading phrases  -- ongoing  -KA     Comments: Patient will reduce impact of apraxia on verbal speech by repeating/reading phrases  repeating words of increased length and short sentences=90%  -KA     Patient will reduce impact of apraxia on verbal speech by repeating/reading words of increasing length and complexity  90%:;without cues  -KA     Status: Patient will reduce impact of apraxia on verbal speech by repeating/reading words of increasing length and complexity  Progressing as expected  -KA     Comments: Patient will reduce impact of apraxia on verbal speech by repeating/reading words of increasing length and complexity  Patient repeated  multi-syllabic words with 90% without cues   -MARIELA       User Key  (r) = Recorded By, (t) = Taken By, (c) = Cosigned By    Initials Name Provider Type    Flaco Brody MA,CCC-SLP Speech and Language Pathologist                         Time Calculation:   SLP Start Time: 1100  SLP Stop Time: 1200  SLP Time Calculation (min): 60 min    Therapy Charges for Today     Code Description Service Date Service Provider Modifiers Qty    99881740027 [facility] TREATMENT SPEECH 4 1/9/2019 Flaco Carrero MA,CCC-SLP GN 1                   Flaco Carrero MA,CCC-SLP  1/9/2019

## 2019-01-11 ENCOUNTER — HOSPITAL ENCOUNTER (OUTPATIENT)
Dept: SPEECH THERAPY | Facility: HOSPITAL | Age: 84
Setting detail: THERAPIES SERIES
Discharge: HOME | End: 2019-01-11

## 2019-01-11 DIAGNOSIS — R47.01 APHASIA: Primary | ICD-10-CM

## 2019-01-11 DIAGNOSIS — C71.1 GLIOBLASTOMA MULTIFORME OF FRONTAL LOBE (HCC): ICD-10-CM

## 2019-01-11 PROCEDURE — 92507 TX SP LANG VOICE COMM INDIV: CPT | Performed by: SPEECH-LANGUAGE PATHOLOGIST

## 2019-01-11 NOTE — THERAPY TREATMENT NOTE
Outpatient Speech Language Pathology   Adult Speech Language Cognitive Treatment Note  [facility]     Patient Name: Silas Haines  : 1935  MRN: [id number]  Today's Date: 2019         Visit Date: 2019   Patient Active Problem List   Diagnosis   • Chronic coronary artery disease   • Hypertension   • Hypercholesterolemia   • Multiple myeloma (CMS/HCC)   • Postherpetic neuralgia   • Shingles (herpes zoster) polyneuropathy   • Herpes zoster   • Squamous cell carcinoma   • Thoracic root lesion   • Squamous cell carcinoma of skin of scalp   • Cold intolerance   • Impacted cerumen of right ear   • Edema of extremities   • Healthcare-associated pneumonia   • Personal history of other diseases of the circulatory system   • Normocytic normochromic anemia   • Inflammatory pseudotumor of orbit   • Periorbital cellulitis   • Malignant neoplasm of prostate (CMS/HCC)   • Left inguinal hernia          Visit Dx:    ICD-10-CM ICD-9-CM   1. Aphasia R47.01 784.3   2. Glioblastoma multiforme of frontal lobe (CMS/HCC) C71.1 191.1                         SLP OP Goals     Row Name 19 1200          Subjective Comments    Subjective Comments  Patient is pleasant and cooperative  -KA        Subjective Pain    Able to rate subjective pain?  yes  -KA     Pre-Treatment Pain Level  0  -KA     Post-Treatment Pain Level  0  -KA        Written Language Comprehension Goals    Patient will improve comprehension of written language skills by reading short paragraphs and correctly answering written Yes/No questions  90%:;without cues  -KA     Status: Patient will improve comprehension of written language skills by reading short paragraphs and correctly answering written Yes/No questions  Progressing as expected  -KA     Comments: Patient will improve comprehension of written language skills by reading short paragraphs and correctly answering written Yes/No questions  reading comprehension of short multi-paragraph=90% with  ongoing improvement today   -KA        Written Language Expression Goals    Patient will improve written language skills by writing sentence when provided one/two key words  90%:;with cues  -KA     Status: Patient will improve written language skills by writing sentence when provided one/two key words  Progressing as expected  -KA     Comments: Patient will improve written language skills by writing sentence when provided one/two key words   writing setences to complete a phrase=80% without cues   -KA        Verbal Expression Goals    Patient will improve verbal expressive language skills by completing divergent naming tasks  90%:;without cues  -KA     Status: Patient will improve verbal expressive language skills by completing divergent naming tasks  Progressing as expected  -KA     Comments: Patient will improve verbal expressive language skills by completing divergent naming tasks  Improvement today average of 8 without cues within 60 seconds in concrete category and 14 with min cues  -KA     Patient will improve verbal expressive language skills by describing attributes, function, action and/or uses of an object/item  90%:;without cues  -KA     Status: Patient will improve verbal expressive language skills by describing attributes, function, action and/or uses of an object/item  Progressing as expected  -KA     Comments: Patient will improve verbal expressive language skills by describing attributes, function, action and/or uses of an object/item  80% without cues, occasional delay and hesitiation with word finding  -KA     Patient will improve verbal expressive language skills by expressing complex concepts and ideas  90%:;without cues  -KA     Status: Patient will improve verbal expressive language skills by expressing complex concepts and ideas  Progressing as expected  -KA     Comments: Patient will improve verbal expressive language skills by expressing complex concepts and ideas  summarizing story read=80%  at the multisentence level with occasional pauses, delays and hesitiations, same accuracy during picture description tasks at the multisentence level, overall pt able to effectively formulate sentences and words, requires increased time  -KA        Apraxia Goals    Patient will reduce impact of apraxia on verbal speech by repeating/reading phrases  90%:;without cues  -KA     Status: Patient will reduce impact of apraxia on verbal speech by repeating/reading phrases  Progressing as expected  -KA     Comments: Patient will reduce impact of apraxia on verbal speech by repeating/reading phrases  repeating words of increased length and short sentences=90%  -KA       User Key  (r) = Recorded By, (t) = Taken By, (c) = Cosigned By    Initials Name Provider Type    Flaco Brody MA,CCC-SLP Speech and Language Pathologist                         Time Calculation:   SLP Start Time: 1100  SLP Stop Time: 1200  SLP Time Calculation (min): 60 min    Therapy Charges for Today     Code Description Service Date Service Provider Modifiers Qty    [id number]  ST TREATMENT SPEECH 4 1/11/2019 Flaco Carrero MA,CCC-SLP GN 1                   Flaco Carrero MA,CCC-SLP  1/11/2019

## 2019-01-16 ENCOUNTER — APPOINTMENT (OUTPATIENT)
Dept: SPEECH THERAPY | Facility: HOSPITAL | Age: 84
End: 2019-01-16

## 2019-01-18 ENCOUNTER — HOSPITAL ENCOUNTER (OUTPATIENT)
Dept: SPEECH THERAPY | Facility: HOSPITAL | Age: 84
Setting detail: THERAPIES SERIES
Discharge: HOME | End: 2019-01-18

## 2019-01-18 DIAGNOSIS — C71.1 GLIOBLASTOMA MULTIFORME OF FRONTAL LOBE (HCC): ICD-10-CM

## 2019-01-18 DIAGNOSIS — R47.01 APHASIA: Primary | ICD-10-CM

## 2019-01-18 PROCEDURE — 92507 TX SP LANG VOICE COMM INDIV: CPT | Performed by: SPEECH-LANGUAGE PATHOLOGIST

## 2019-01-18 NOTE — THERAPY TREATMENT NOTE
Outpatient Speech Language Pathology   Adult Speech Language Cognitive Treatment Note  [facility]     Patient Name: Silas Haines  : 1935  MRN: [id number]  Today's Date: 2019         Visit Date: 2019   Patient Active Problem List   Diagnosis   • Chronic coronary artery disease   • Hypertension   • Hypercholesterolemia   • Multiple myeloma (CMS/HCC)   • Postherpetic neuralgia   • Shingles (herpes zoster) polyneuropathy   • Herpes zoster   • Squamous cell carcinoma   • Thoracic root lesion   • Squamous cell carcinoma of skin of scalp   • Cold intolerance   • Impacted cerumen of right ear   • Edema of extremities   • Healthcare-associated pneumonia   • Personal history of other diseases of the circulatory system   • Normocytic normochromic anemia   • Inflammatory pseudotumor of orbit   • Periorbital cellulitis   • Malignant neoplasm of prostate (CMS/HCC)   • Left inguinal hernia          Visit Dx:    ICD-10-CM ICD-9-CM   1. Aphasia R47.01 784.3   2. Glioblastoma multiforme of frontal lobe (CMS/HCC) C71.1 191.1                         SLP OP Goals     Row Name 19 1200          Subjective Comments    Subjective Comments  Mr Haines is pleasant and cooperative, does become fatigued end of the session with increase delay in word finding at times.  -KA        Subjective Pain    Able to rate subjective pain?  yes  -KA     Pre-Treatment Pain Level  0  -KA     Post-Treatment Pain Level  0  -KA        Written Language Comprehension Goals    Patient will improve comprehension of written language skills by reading short paragraphs and correctly answering written Yes/No questions  90%:;without cues  -KA     Status: Patient will improve comprehension of written language skills by reading short paragraphs and correctly answering written Yes/No questions  -- ongoing  -KA     Comments: Patient will improve comprehension of written language skills by reading short paragraphs and correctly answering  written Yes/No questions  reading comprehension of short multi-paragraph=70% without cues, increase in complexity and detail today   -KA        Verbal Expression Goals    Patient will improve verbal expressive language skills by providing multiple definitions/meanings when given a word  90%:;without cues  -KA     Status: Patient will improve verbal expressive language skills by providing multiple definitions/meanings when given a word  -- ongoing  -KA     Comments: Patient will improve verbal expressive language skills by providing multiple definitions/meanings when given a word  70% without cues and 90% with min cues  -KA     Patient will improve verbal expressive language skills by completing divergent naming tasks  90%:;without cues  -KA     Status: Patient will improve verbal expressive language skills by completing divergent naming tasks  Progressing as expected  -KA     Comments: Patient will improve verbal expressive language skills by completing divergent naming tasks  Improvement today average of 10 without cues within 60 seconds in concrete category and 14 with min cues  -KA     Patient will improve verbal expressive language skills by describing attributes, function, action and/or uses of an object/item  90%:;without cues  -KA     Status: Patient will improve verbal expressive language skills by describing attributes, function, action and/or uses of an object/item  Progressing as expected  -KA     Comments: Patient will improve verbal expressive language skills by describing attributes, function, action and/or uses of an object/item  80% without cues, occasional delay and hesitiation with word finding  -KA     Patient will improve verbal expressive language skills by expressing complex concepts and ideas  90%:;without cues  -KA     Status: Patient will improve verbal expressive language skills by expressing complex concepts and ideas  -- ongoing  -KA     Comments: Patient will improve verbal expressive  language skills by expressing complex concepts and ideas  Summarizing story and conversating at the conversation level 70% with some delays and increase in pauses and hesitiations at the end of session due to fatigue, does describe semantic features with 70% accuracy at the conversation level  -KA        Apraxia Goals    Patient will reduce impact of apraxia on verbal speech by repeating/reading phrases  90%:;without cues  -KA     Status: Patient will reduce impact of apraxia on verbal speech by repeating/reading phrases  Progressing as expected  -KA     Comments: Patient will reduce impact of apraxia on verbal speech by repeating/reading phrases  repeating multisyllabic words 90% without cues  -KA     Comments: Patient will reduce impact of apraxia on verbal speech by repeating/reading words of increasing length and complexity  Patient repeated multi-syllabic words with 90% without cues   -KA       User Key  (r) = Recorded By, (t) = Taken By, (c) = Cosigned By    Initials Name Provider Type    Flaco Brody MA,CCC-SLP Speech and Language Pathologist                         Time Calculation:   SLP Start Time: 1100  SLP Stop Time: 1200  SLP Time Calculation (min): 60 min    Therapy Charges for Today     Code Description Service Date Service Provider Modifiers Qty    [id number]  ST TREATMENT SPEECH 4 1/18/2019 Flaco Carrero MA,CCC-SLP GN 1                   Flaco Carrero MA,CCC-SLP  1/18/2019

## 2019-01-23 ENCOUNTER — HOSPITAL ENCOUNTER (OUTPATIENT)
Dept: SPEECH THERAPY | Facility: HOSPITAL | Age: 84
Setting detail: THERAPIES SERIES
Discharge: HOME | End: 2019-01-23

## 2019-01-23 DIAGNOSIS — C71.1 GLIOBLASTOMA MULTIFORME OF FRONTAL LOBE (HCC): ICD-10-CM

## 2019-01-23 DIAGNOSIS — R47.01 APHASIA: Primary | ICD-10-CM

## 2019-01-23 PROCEDURE — 92507 TX SP LANG VOICE COMM INDIV: CPT | Performed by: SPEECH-LANGUAGE PATHOLOGIST

## 2019-01-23 NOTE — THERAPY TREATMENT NOTE
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 Aphasia R47.01 784.3   2. Glioblastoma multiforme of frontal lobe (CMS/HCC) C71.1 191.1                         SLP OP Goals     Row Name 19 1200          Subjective Comments    Subjective Comments  Patient is pleasant and cooperative. He reported he had a repeat MRI last week at [facility], and has a f/u appointment this afternoon for results.   -KA        Subjective Pain    Able to rate subjective pain?  yes  -KA     Pre-Treatment Pain Level  0  -KA     Post-Treatment Pain Level  0  -KA        Written Language Comprehension Goals    Patient will improve comprehension of written language skills by reading short paragraphs and correctly answering written Yes/No questions  90%:;without cues  -KA     Status: Patient will improve comprehension of written language skills by reading short paragraphs and correctly answering written Yes/No questions  Progressing as expected  -KA     Comments: Patient will improve comprehension of written language skills by reading short  paragraphs and correctly answering written Yes/No questions  reading comprehension of paragraph=90% without cues, increased processing speed  -KA        Written Language Expression Goals    Patient will improve written language skills by writing sentence when provided one/two key words  90%:;without cues  -KA     Status: Patient will improve written language skills by writing sentence when provided one/two key words  Progressing as expected  -KA     Comments: Patient will improve written language skills by writing sentence when provided one/two key words   writing setences to complete a phrase=80% without cues increased time  -KA        Verbal Expression Goals    Patient will improve verbal expressive language skills by completing divergent naming tasks  90%:;without cues  -KA     Status: Patient will improve verbal expressive language skills by completing divergent naming tasks  -- ongoing  -KA     Comments: Patient will improve verbal expressive language skills by completing divergent naming tasks  Average of 4 in abstract categories without cues within 60 seconds, with no time constraints patient named average of 7  -KA     Patient will improve verbal expressive language skills by describing attributes, function, action and/or uses of an object/item  90%:;without cues  -KA     Status: Patient will improve verbal expressive language skills by describing attributes, function, action and/or uses of an object/item  Progressing as expected  -KA     Comments: Patient will improve verbal expressive language skills by describing attributes, function, action and/or uses of an object/item  80% without cues, occasional delay and hesitiation with word finding however formulated functional sentences with increased item.   -KA        Apraxia Goals    Patient will reduce impact of apraxia on verbal speech by repeating/reading phrases  90%:;without cues  -KA     Status: Patient will reduce impact of apraxia on verbal speech by  repeating/reading phrases  Progressing as expected  -MARIELA     Comments: Patient will reduce impact of apraxia on verbal speech by repeating/reading phrases  reading sentences=80% increased time and occasional repetitions at times  -MARIELA       User Key  (r) = Recorded By, (t) = Taken By, (c) = Cosigned By    Initials Name Provider Type    Flaco Brody MA,CCC-SLP Speech and Language Pathologist                         Time Calculation:   SLP Start Time: 1100  SLP Stop Time: 1200  SLP Time Calculation (min): 60 min    Therapy Charges for Today     Code Description Service Date Service Provider Modifiers Qty    [id number] [facility] TREATMENT SPEECH 4 1/23/2019 Flaco Carrero MA,CCC-SLP GN 1                   Flaco Carrero MA,CCC-SLP  1/23/2019

## 2019-01-25 ENCOUNTER — HOSPITAL ENCOUNTER (OUTPATIENT)
Dept: SPEECH THERAPY | Facility: HOSPITAL | Age: 84
Setting detail: THERAPIES SERIES
Discharge: HOME | End: 2019-01-25

## 2019-01-25 DIAGNOSIS — C71.1 GLIOBLASTOMA MULTIFORME OF FRONTAL LOBE (HCC): ICD-10-CM

## 2019-01-25 DIAGNOSIS — R47.01 APHASIA: Primary | ICD-10-CM

## 2019-01-25 PROCEDURE — 92507 TX SP LANG VOICE COMM INDIV: CPT | Performed by: SPEECH-LANGUAGE PATHOLOGIST

## 2019-01-25 NOTE — THERAPY TREATMENT NOTE
"Outpatient Speech Language Pathology   Adult Speech Language Cognitive Treatment Note  [facility]     Patient Name: Silas Haines  : 1935  MRN: [id number]  Today's Date: 2019         Visit Date: 2019   Patient Active Problem List   Diagnosis   • Chronic coronary artery disease   • Hypertension   • Hypercholesterolemia   • Multiple myeloma (CMS/HCC)   • Postherpetic neuralgia   • Shingles (herpes zoster) polyneuropathy   • Herpes zoster   • Squamous cell carcinoma   • Thoracic root lesion   • Squamous cell carcinoma of skin of scalp   • Cold intolerance   • Impacted cerumen of right ear   • Edema of extremities   • Healthcare-associated pneumonia   • Personal history of other diseases of the circulatory system   • Normocytic normochromic anemia   • Inflammatory pseudotumor of orbit   • Periorbital cellulitis   • Malignant neoplasm of prostate (CMS/HCC)   • Left inguinal hernia          Visit Dx:    ICD-10-CM ICD-9-CM   1. Aphasia R47.01 784.3   2. Glioblastoma multiforme of frontal lobe (CMS/HCC) C71.1 191.1                         SLP OP Goals     Row Name 19 1400          Subjective Comments    Subjective Comments  Mr Haines reported he had MRI last week and had f/u with this MD this week regarding results. 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 Patient reported he is going to be starting a new treatment soon, Immunotherapy. SLP asked pt if he knows his prognosis from his MD, or if he was told his radiation/chemo helped shrink his cancer/tumor and pt stated, \"I don't know I wasn't told.\"   -KA        Subjective Pain    Able to rate subjective pain?  yes  -KA     Pre-Treatment Pain Level  0  -KA     Post-Treatment Pain Level  0  -KA        Written Language Comprehension Goals    Patient will improve comprehension of written language skills " by reading short paragraphs and correctly answering written Yes/No questions  90%:;without cues  -KA     Status: Patient will improve comprehension of written language skills by reading short paragraphs and correctly answering written Yes/No questions  Progressing as expected  -KA     Comments: Patient will improve comprehension of written language skills by reading short paragraphs and correctly answering written Yes/No questions  reading comprehension of paragraph=90% without cues, increased processing speed  -KA        Written Language Expression Goals    Patient will improve written language skills by writing sentence when provided one/two key words  90%:;without cues  -KA     Status: Patient will improve written language skills by writing sentence when provided one/two key words  Progressing as expected  -KA     Comments: Patient will improve written language skills by writing sentence when provided one/two key words  writing single words to complete sentences=80% and writing single words in specific category=80%  -KA        Verbal Expression Goals    Patient will improve verbal expressive language skills by completing divergent naming tasks  90%:;without cues  -KA     Status: Patient will improve verbal expressive language skills by completing divergent naming tasks  Progressing as expected  -KA     Comments: Patient will improve verbal expressive language skills by completing divergent naming tasks  Average of 12 today in concrete category without cues improvement  -KA     Patient will improve verbal expressive language skills by describing attributes, function, action and/or uses of an object/item  90%:;without cues  -KA     Status: Patient will improve verbal expressive language skills by describing attributes, function, action and/or uses of an object/item  Progressing as expected  -KA     Comments: Patient will improve verbal expressive language skills by describing attributes, function, action and/or  uses of an object/item  90% without cues, occasional delay and hesitiation with word finding however formulated functional sentences with increased item.   -KA     Patient will improve verbal expressive language skills by expressing complex concepts and ideas  90%:;without cues  -KA     Status: Patient will improve verbal expressive language skills by expressing complex concepts and ideas  Progressing as expected  -KA     Comments: Patient will improve verbal expressive language skills by expressing complex concepts and ideas  SUmmarizing at the conversation level 80% without cues with occasional delays, pauses or hesitiations  -KA        Apraxia Goals    Patient will reduce impact of apraxia on verbal speech by repeating/reading phrases  90%:;without cues  -KA     Status: Patient will reduce impact of apraxia on verbal speech by repeating/reading phrases  Progressing as expected  -KA     Comments: Patient will reduce impact of apraxia on verbal speech by repeating/reading phrases  reading sentences=80% increased time and occasional repetitions at times  -KA       User Key  (r) = Recorded By, (t) = Taken By, (c) = Cosigned By    Initials Name Provider Type    Flaco Brody MA,CCC-SLP Speech and Language Pathologist                         Time Calculation:   SLP Start Time: 1100  SLP Stop Time: 1200  SLP Time Calculation (min): 60 min    Therapy Charges for Today     Code Description Service Date Service Provider Modifiers Qty    [id number]  ST TREATMENT SPEECH 4 1/25/2019 Flaco Carrero MA,CCC-SLP GN 1                   Flaco Carrero MA,CCC-SLP  1/25/2019

## 2019-01-30 ENCOUNTER — APPOINTMENT (OUTPATIENT)
Dept: SPEECH THERAPY | Facility: HOSPITAL | Age: 84
End: 2019-01-30

## 2019-02-01 ENCOUNTER — HOSPITAL ENCOUNTER (OUTPATIENT)
Dept: SPEECH THERAPY | Facility: HOSPITAL | Age: 84
Setting detail: THERAPIES SERIES
Discharge: HOME | End: 2019-02-01

## 2019-02-01 DIAGNOSIS — R47.01 APHASIA: Primary | ICD-10-CM

## 2019-02-01 DIAGNOSIS — C71.1 GLIOBLASTOMA MULTIFORME OF FRONTAL LOBE (HCC): ICD-10-CM

## 2019-02-01 PROCEDURE — 92507 TX SP LANG VOICE COMM INDIV: CPT | Performed by: SPEECH-LANGUAGE PATHOLOGIST

## 2019-02-01 NOTE — THERAPY PROGRESS REPORT/RE-CERT
Outpatient Speech Language Pathology   Adult Speech Language Cognitive Progress Note  [facility]     Patient Name: Silas Haines  : 1935  MRN: [id number]  Today's Date: 2019         Visit Date: 2019   Patient Active Problem List   Diagnosis   • Chronic coronary artery disease   • Hypertension   • Hypercholesterolemia   • Multiple myeloma (CMS/HCC)   • Postherpetic neuralgia   • Shingles (herpes zoster) polyneuropathy   • Herpes zoster   • Squamous cell carcinoma   • Thoracic root lesion   • Squamous cell carcinoma of skin of scalp   • Cold intolerance   • Impacted cerumen of right ear   • Edema of extremities   • Healthcare-associated pneumonia   • Personal history of other diseases of the circulatory system   • Normocytic normochromic anemia   • Inflammatory pseudotumor of orbit   • Periorbital cellulitis   • Malignant neoplasm of prostate (CMS/HCC)   • Left inguinal hernia          Visit Dx:    ICD-10-CM ICD-9-CM   1. Aphasia R47.01 784.3   2. Glioblastoma multiforme of frontal lobe (CMS/HCC) C71.1 191.1                         SLP OP Goals     Row Name 19 1200          Subjective Comments    Subjective Comments  Mr Haines is pleasant and cooperative. Discussed anticipate upcoming d/c after 2 or 3 sessions.   -KA        Subjective Pain    Able to rate subjective pain?  yes  -KA     Pre-Treatment Pain Level  0  -KA     Post-Treatment Pain Level  0  -KA        Written Language Expression Goals    Patient will be able to complete basic writing tasks to facilitate communication in home environment  90%:;without cues  -KA     Status: Patient will be able to complete basic writing tasks to facilitate communication in home environment  Progressing as expected  -KA     Comments: Patient will be able to complete basic writing tasks to facilitate communication in home environment  Patient demonstrates some flucutation with writing skills, overall accuracy with writing single words and  sentences is 70% with some spelling errors, pt able to identify and self monitor errors majority of the time  -KA     Written Language Expression STG's  Patient will improve written language skills by completing printed phrases/sentences  -KA     Patient will improve written language skills by completing printed phrases/sentences  90%:;without cues  -KA     Status: Patient will improve written language skills by completing printed phrases/sentences  New  -KA     Comments: Patient will improve written language skills by completing printed phrases/sentences  writing single words to complete a category (divergent thought organization) 70% without cues   -KA     Patient will improve written language skills by writing sentence when provided one/two key words  90%:;without cues  -KA     Status: Patient will improve written language skills by writing sentence when provided one/two key words  -- ongoing  -KA        Verbal Expression Goals    Patient will be able to use verbal expressive language skills to communicate effectively in social/avocational/work setting  90%:;without cues  -KA     Status: Patient will be able to use verbal expressive language skills to communicate effectively in social/avocational/work setting  Progressing as expected  -KA     Comments: Patient will be able to use verbal expressive language skills to communicate effectively in social/avocational/work setting  Overall pt effectively communicates in majority of situtions, increased time to formulate sentences with some pauses and hesitations, overall functional 80 to 90% accuracy q  -KA     Patient will improve verbal expressive language skills by completing divergent naming tasks  90%:;without cues  -KA     Status: Patient will improve verbal expressive language skills by completing divergent naming tasks  Progressing as expected  -KA     Comments: Patient will improve verbal expressive language skills by completing divergent naming tasks  Average  of 12 today in concrete category without cues improvement  -KA     Patient will improve verbal expressive language skills by describing attributes, function, action and/or uses of an object/item  90%:;without cues  -KA     Status: Patient will improve verbal expressive language skills by describing attributes, function, action and/or uses of an object/item  Progressing as expected  -KA     Comments: Patient will improve verbal expressive language skills by describing attributes, function, action and/or uses of an object/item  90% without cues, occasional delay and hesitiation with word finding however formulated functional sentences with increased item.   -KA     Patient will improve verbal expressive language skills by expressing complex concepts and ideas  90%:;without cues  -KA     Status: Patient will improve verbal expressive language skills by expressing complex concepts and ideas  Progressing as expected  -KA     Comments: Patient will improve verbal expressive language skills by expressing complex concepts and ideas  SUmmarizing at the conversation level 80% without cues with occasional delays, pauses or hesitiations  -KA        Apraxia Goals    Patient will use verbal speech that is perceived as smooth and natural-sounding by familiar/unfamiliar listeners  90%:;without cues  -KA     Status: Patient will use verbal speech that is perceived as smooth and natural-sounding by familiar/unfamiliar listeners  Progressing as expected  -KA     Comments: Patient will use verbal speech that is perceived as smooth and natural-sounding by familiar/unfamiliar listeners  see information above  -KA     Patient will reduce impact of apraxia on verbal speech by repeating/reading phrases  90%:;without cues  -KA     Status: Patient will reduce impact of apraxia on verbal speech by repeating/reading phrases  Progressing as expected  -KA     Comments: Patient will reduce impact of apraxia on verbal speech by repeating/reading  phrases  repeating sentences-90% without cues or repetitions  -KA     Patient will reduce impact of apraxia on verbal speech by repeating/reading words of increasing length and complexity  90%:;without cues  -KA     Status: Patient will reduce impact of apraxia on verbal speech by repeating/reading words of increasing length and complexity  Achieved  -KA     Comments: Patient will reduce impact of apraxia on verbal speech by repeating/reading words of increasing length and complexity  Patient repeated multi-syllabic words with 90% without cues   -KA     Patient will reduce impact of apraxia on verbal speech by reading paragraph level material with  90%:;with cues  -KA     Status: Patient will reduce impact of apraxia on verbal speech by reading paragraph level material with  -- ongoing  -KA     Comments: Patient will reduce impact of apraxia on verbal speech by reading paragraph level material with  reading sentences in paragraph-70% with some pauses and hesitations  -KA       User Key  (r) = Recorded By, (t) = Taken By, (c) = Cosigned By    Initials Name Provider Type    Flaco Brody MA,CCC-SLP Speech and Language Pathologist          OP SLP Education     Row Name 02/01/19 1238       Education    Barriers to Learning  No barriers identified  -MARIELA    Assessed  Learning needs;Learning motivation  -MARIELA    Learning Motivation  Strong  -MARIELA    Learning Method  Explanation  -KA    Teaching Response  Verbalized understanding  -MARIELA      User Key  (r) = Recorded By, (t) = Taken By, (c) = Cosigned By    Initials Name Effective Dates    Flaco Brody MA,CCC-SLP 06/08/18 -           OP SLP Assessment/Plan - 02/01/19 1237        SLP Assessment    Functional Problems  Speech Language- Adult/Cognition   -MARIELA    Impact on Function: Adult Speech Language/Cognition  Difficulty communicating wants, needs, and ideas;Difficulty communicating in a medical emergency;Restrictions in personal and social life;Difficulty participating  in avocational activities   -MARIELA    Clinical Impression: Speech Language-Adult/Congnition  Mild:;Receptive Aphasia;Expressive Aphasia   -    Prognosis  Good (comment)   -MARIELA    Patient/caregiver participated in establishment of treatment plan and goals  Yes   -KA    Patient would benefit from skilled therapy intervention  Yes   -KA       SLP Plan    Frequency  -- 2x a week    2x a week  -KA    Duration  -- 2 weeks    2 weeks  -KA    Planned CPT's?  SLP INDIVIDUAL SPEECH THERAPY: 82636   -    Expected Duration Therapy Session - minutes  45-60 minutes   -MARIELA      User Key  (r) = Recorded By, (t) = Taken By, (c) = Cosigned By    Initials Name Provider Type    Flaco Brody MA,CCC-SLP Speech and Language Pathologist                 Time Calculation:   SLP Start Time: 1100  SLP Stop Time: 1200  SLP Time Calculation (min): 60 min    Therapy Charges for Today     Code Description Service Date Service Provider Modifiers Qty    18283243191 [facility] TREATMENT SPEECH 4 2/1/2019 Flaco Carrero MA,CCC-SLP GN 1                   Flaco Carrero MA,CCC-SLP  2/1/2019

## 2019-02-06 ENCOUNTER — APPOINTMENT (OUTPATIENT)
Dept: SPEECH THERAPY | Facility: HOSPITAL | Age: 84
End: 2019-02-06

## 2019-02-08 ENCOUNTER — HOSPITAL ENCOUNTER (OUTPATIENT)
Dept: SPEECH THERAPY | Facility: HOSPITAL | Age: 84
Setting detail: THERAPIES SERIES
Discharge: HOME | End: 2019-02-08

## 2019-02-08 DIAGNOSIS — C71.1 GLIOBLASTOMA MULTIFORME OF FRONTAL LOBE (HCC): ICD-10-CM

## 2019-02-08 DIAGNOSIS — R47.01 APHASIA: Primary | ICD-10-CM

## 2019-02-08 PROCEDURE — 92507 TX SP LANG VOICE COMM INDIV: CPT | Performed by: SPEECH-LANGUAGE PATHOLOGIST

## 2019-02-08 NOTE — THERAPY TREATMENT NOTE
Outpatient Speech Language Pathology   Adult Speech Language Cognitive Treatment Note  [facility]     Patient Name: Silas Haines  : 1935  MRN: [id number]  Today's Date: 2019         Visit Date: 2019   Patient Active Problem List   Diagnosis   • Chronic coronary artery disease   • Hypertension   • Hypercholesterolemia   • Multiple myeloma (CMS/HCC)   • Postherpetic neuralgia   • Shingles (herpes zoster) polyneuropathy   • Herpes zoster   • Squamous cell carcinoma   • Thoracic root lesion   • Squamous cell carcinoma of skin of scalp   • Cold intolerance   • Impacted cerumen of right ear   • Edema of extremities   • Healthcare-associated pneumonia   • Personal history of other diseases of the circulatory system   • Normocytic normochromic anemia   • Inflammatory pseudotumor of orbit   • Periorbital cellulitis   • Malignant neoplasm of prostate (CMS/HCC)   • Left inguinal hernia          Visit Dx:    ICD-10-CM ICD-9-CM   1. Aphasia R47.01 784.3   2. Glioblastoma multiforme of frontal lobe (CMS/HCC) C71.1 191.1                         SLP OP Goals     Row Name 19 1200          Subjective Comments    Subjective Comments  Patient is pleasant and motivated, discussed anticipate upcoming d/c next week. Discussed importance he continue to participate and perform functional tasks at home,and continue to perform enjoyable tasks/hobbies, including word puzzles, reading the newspaper, going out to lunch/dinner with family and friends etc.  SLP discussed strategies, including slowing pt and taking his time Patient is independent with describing semantic features. Patient's language continues to demonstrate inconsistency, at times effortful less with functional word finding and thought formulation, and other times delayed word finding with hesitations and pauses. Pt and wife report the same at home. -KA        Subjective Pain    Able to rate subjective pain?  yes  -KA     Pre-Treatment Pain Level  0  " -KA     Post-Treatment Pain Level  0  -KA        Written Language Comprehension Goals    Patient will improve comprehension of written language skills by reading short paragraphs and correctly answering written Yes/No questions  90%:;without cues  -KA     Status: Patient will improve comprehension of written language skills by reading short paragraphs and correctly answering written Yes/No questions  Progressing as expected  -KA     Comments: Patient will improve comprehension of written language skills by reading short paragraphs and correctly answering written Yes/No questions  reading comprehension of paragraph=90% without cues, increased processing speed  -KA        Written Language Expression Goals    Patient will improve written language skills by completing printed phrases/sentences  90%:;without cues  -KA     Status: Patient will improve written language skills by completing printed phrases/sentences  -- ongoing  -KA     Comments: Patient will improve written language skills by completing printed phrases/sentences  writing single words to complete a category (divergent thought organization) 70% without cues and writing single words in story completion task=90%, pt reports his writing is \"worse,\" SLP provided homework and functional writing tasks to continue to target and work on.  -KA     Comments: Patient will improve written language skills by writing sentence when provided one/two key words  provided for homework   -KA        Verbal Expression Goals    Patient will improve verbal expressive language skills by describing attributes, function, action and/or uses of an object/item  90%:;without cues  -KA     Status: Patient will improve verbal expressive language skills by describing attributes, function, action and/or uses of an object/item  Progressing as expected  -KA     Comments: Patient will improve verbal expressive language skills by describing attributes, function, action and/or uses of an " object/item  90% without cues, occasional delay and hesitiation with word finding however formulated functional sentences with increased item.   -KA     Patient will improve verbal expressive language skills by expressing complex concepts and ideas  90%:;without cues  -KA     Status: Patient will improve verbal expressive language skills by expressing complex concepts and ideas  Progressing as expected  -KA     Comments: Patient will improve verbal expressive language skills by expressing complex concepts and ideas  SUmmarizing at the conversation level 80% without cues with occasional delays, pauses or hesitiations  -KA       User Key  (r) = Recorded By, (t) = Taken By, (c) = Cosigned By    Initials Name Provider Type    Flaco Brody MA,CCC-SLP Speech and Language Pathologist                         Time Calculation:   SLP Start Time: 1100  SLP Stop Time: 1200  SLP Time Calculation (min): 60 min    Therapy Charges for Today     Code Description Service Date Service Provider Modifiers Qty    [id number] [facility] TREATMENT SPEECH 4 2/8/2019 Flaco Carrero MA,CCC-SLP GN 1                   Flaco Carrero MA,CCC-SLP  2/8/2019

## 2019-02-13 ENCOUNTER — HOSPITAL ENCOUNTER (OUTPATIENT)
Dept: SPEECH THERAPY | Facility: HOSPITAL | Age: 84
Setting detail: THERAPIES SERIES
Discharge: HOME | End: 2019-02-13

## 2019-02-13 DIAGNOSIS — C71.1 GLIOBLASTOMA MULTIFORME OF FRONTAL LOBE (HCC): ICD-10-CM

## 2019-02-13 DIAGNOSIS — R47.01 APHASIA: Primary | ICD-10-CM

## 2019-02-13 PROCEDURE — 92507 TX SP LANG VOICE COMM INDIV: CPT | Performed by: SPEECH-LANGUAGE PATHOLOGIST

## 2019-02-13 NOTE — THERAPY DISCHARGE NOTE
Outpatient Speech Language Pathology   Adult Speech Language Cognitive Treatment Note/Discharge Summary  [facility]     Patient Name: Silas Haines  : 1935  MRN: [id number]  Today's Date: 2019         Visit Date: 2019   Patient Active Problem List   Diagnosis   • Chronic coronary artery disease   • Hypertension   • Hypercholesterolemia   • Multiple myeloma (CMS/HCC)   • Postherpetic neuralgia   • Shingles (herpes zoster) polyneuropathy   • Herpes zoster   • Squamous cell carcinoma   • Thoracic root lesion   • Squamous cell carcinoma of skin of scalp   • Cold intolerance   • Impacted cerumen of right ear   • Edema of extremities   • Healthcare-associated pneumonia   • Personal history of other diseases of the circulatory system   • Normocytic normochromic anemia   • Inflammatory pseudotumor of orbit   • Periorbital cellulitis   • Malignant neoplasm of prostate (CMS/HCC)   • Left inguinal hernia          Visit Dx:    ICD-10-CM ICD-9-CM   1. Aphasia R47.01 784.3   2. Glioblastoma multiforme of frontal lobe (CMS/HCC) C71.1 191.1                       SLP OP Goals     Row Name 19 1200          Subjective Comments    Subjective Comments  Education completed on home exercise program, pt d/c today  -KA        Subjective Pain    Able to rate subjective pain?  yes  -KA     Pre-Treatment Pain Level  0  -KA     Post-Treatment Pain Level  0  -KA        Written Language Expression Goals    Patient will be able to complete basic writing tasks to facilitate communication in home environment  90%:;without cues  -KA     Status: Patient will be able to complete basic writing tasks to facilitate communication in home environment  Achieved  -KA     Comments: Patient will be able to complete basic writing tasks to facilitate communication in home environment  Patient demonstrates some flucutation with writing skills, overall accuracy with writing single words and sentences is 70% to 80% with some spelling  errors, pt able to identify and self monitor errors majority of the time  -KA     Patient will improve written language skills by completing printed phrases/sentences  90%:;without cues  -KA     Status: Patient will improve written language skills by completing printed phrases/sentences  -- partially achieved  -KA     Comments: Patient will improve written language skills by completing printed phrases/sentences  writing sentences in biographical information and sentence completion task 80% with some spelling errors, average accuracy of 80%  -KA     Patient will improve written language skills by writing sentence when provided one/two key words  90%:;without cues  -KA     Status: Patient will improve written language skills by writing sentence when provided one/two key words  -- partially achieved  -KA     Comments: Patient will improve written language skills by writing sentence when provided one/two key words  80% without cues   -KA        Verbal Expression Goals    Patient will be able to use verbal expressive language skills to communicate effectively in social/avocational/work setting  90%:;without cues  -KA     Status: Patient will be able to use verbal expressive language skills to communicate effectively in social/avocational/work setting  Achieved  -KA     Comments: Patient will be able to use verbal expressive language skills to communicate effectively in social/avocational/work setting  Overall pt effectively and functionally communicates in majority of situtions, increased time to formulate sentences with some pauses and hesitations, overall functional 80 to 90% accuracy q  -KA     Status: Patient will improve verbal expressive language skills by providing multiple definitions/meanings when given a word  -- partially achieved  -KA     Comments: Patient will improve verbal expressive language skills by providing multiple definitions/meanings when given a word  average 80% without cues and 90% with min cues   -KA     Patient will improve verbal expressive language skills by describing single/multiple similarities and differences between two target items  90%:;without cues  -KA     Status: Patient will improve verbal expressive language skills by describing single/multiple similarities and differences between two target items  Discontinued  -KA     Patient will improve verbal expressive language skills by completing divergent naming tasks  90%:;without cues  -KA     Status: Patient will improve verbal expressive language skills by completing divergent naming tasks  -- not achieved  -KA     Comments: Patient will improve verbal expressive language skills by completing divergent naming tasks  Average of 12 today in concrete category without cues improvement  -KA     Patient will improve verbal expressive language skills by describing attributes, function, action and/or uses of an object/item  90%:;without cues  -KA     Status: Patient will improve verbal expressive language skills by describing attributes, function, action and/or uses of an object/item  Achieved  -KA     Comments: Patient will improve verbal expressive language skills by describing attributes, function, action and/or uses of an object/item  90% without cues, occasional delay and hesitiation with word finding however formulated functional sentences with increased item.   -KA     Patient will improve verbal expressive language skills by expressing complex concepts and ideas  90%:;without cues  -KA     Status: Patient will improve verbal expressive language skills by expressing complex concepts and ideas  -- partially achieved   -KA     Comments: Patient will improve verbal expressive language skills by expressing complex concepts and ideas  SUmmarizing at the conversation level 80% without cues with occasional delays, pauses or hesitiations  -KA        Apraxia Goals    Patient will use verbal speech that is perceived as smooth and natural-sounding by  familiar/unfamiliar listeners  90%:;without cues  -KA     Status: Patient will use verbal speech that is perceived as smooth and natural-sounding by familiar/unfamiliar listeners  Achieved  -KA     Patient will reduce impact of apraxia on verbal speech by repeating/reading phrases  90%:;without cues  -KA     Status: Patient will reduce impact of apraxia on verbal speech by repeating/reading phrases  Achieved  -KA     Comments: Patient will reduce impact of apraxia on verbal speech by repeating/reading phrases  repeating sentences-90% without cues or repetitions  -KA     Patient will reduce impact of apraxia on verbal speech by repeating/reading words of increasing length and complexity  90%:;without cues  -KA     Status: Patient will reduce impact of apraxia on verbal speech by repeating/reading words of increasing length and complexity  Achieved  -KA     Comments: Patient will reduce impact of apraxia on verbal speech by repeating/reading words of increasing length and complexity  Patient repeated multi-syllabic words with 90% without cues   -KA     Patient will reduce impact of apraxia on verbal speech by reading paragraph level material with  90%:;with cues  -KA     Status: Patient will reduce impact of apraxia on verbal speech by reading paragraph level material with  -- not achieved   -KA     Comments: Patient will reduce impact of apraxia on verbal speech by reading paragraph level material with  reading sentences in paragraph-70% with some pauses and hesitations  -MARIELA       User Key  (r) = Recorded By, (t) = Taken By, (c) = Cosigned By    Initials Name Provider Type    Flaco Brody MA,[facility]-SLP Speech and Language Pathologist              OP SLP Assessment/Plan - 02/13/19 8462        SLP Assessment    Clinical Impression: Speech Language-Adult/Congnition  Minimal:;Expressive Aphasia   -MARIELA      User Key  (r) = Recorded By, (t) = Taken By, (c) = Cosigned By    Initials Name Provider Type    MARIELA Carrero  JESSICA Sam,CCC-SLP Speech and Language Pathologist                   Time Calculation:   SLP Start Time: 1100  SLP Stop Time: 1200  SLP Time Calculation (min): 60 min    Therapy Charges for Today     Code Description Service Date Service Provider Modifiers [place]    [id number]  [facility] TREATMENT SPEECH 4 2/13/2019 Flaco Carrero MA,CCC-SLP GN 1                 OP SLP Discharge Summary  Date of Discharge: 02/13/19  Reason for Discharge: no further expectation of functional progress  Progress Toward Achieving Short/long Term Goals: goals partially met within established timelines  Discharge Destination: home w/ assist  Discharge Instructions: (Overall patient demonstrates the ability to functoinally converse at the conversation level, language comprised of delays, hesitiations and pauses. SLP provided home exercise program to target functional word finding, reading and writing skills. Discussed with pt and wife recommend speech therapy in the future if pt has a change or decline. Pt and wife voiced understanding of education completed today and home exercise program provided.       Flaco Carrero MA,CCC-SLP  2/13/2019

## 2019-02-15 ENCOUNTER — APPOINTMENT (OUTPATIENT)
Dept: SPEECH THERAPY | Facility: HOSPITAL | Age: 84
End: 2019-02-15

## 2019-02-20 ENCOUNTER — APPOINTMENT (OUTPATIENT)
Dept: SPEECH THERAPY | Facility: HOSPITAL | Age: 84
End: 2019-02-20

## 2019-02-21 NOTE — TELEPHONE ENCOUNTER
Pt was dx'd with shingles again, pts oncologist handled sending in medication to get rid of the shingles but pt is experiencing horrible pain due to his postherpetic neuralgia.     Pt has appt with you on 2/25/19     Please advise for pain medication

## 2019-02-22 ENCOUNTER — APPOINTMENT (OUTPATIENT)
Dept: SPEECH THERAPY | Facility: HOSPITAL | Age: 84
End: 2019-02-22

## 2019-02-22 RX ORDER — GABAPENTIN 300 MG/1
300 CAPSULE ORAL NIGHTLY
Qty: 30 CAPSULE | Refills: 0 | Status: SHIPPED | OUTPATIENT
Start: 2019-02-22 | End: 2019-03-01

## 2019-02-27 ENCOUNTER — APPOINTMENT (OUTPATIENT)
Dept: SPEECH THERAPY | Facility: HOSPITAL | Age: 84
End: 2019-02-27

## 2019-03-01 ENCOUNTER — APPOINTMENT (OUTPATIENT)
Dept: SPEECH THERAPY | Facility: HOSPITAL | Age: 84
End: 2019-03-01

## 2019-03-01 ENCOUNTER — OFFICE VISIT (OUTPATIENT)
Dept: INTERNAL MEDICINE | Facility: CLINIC | Age: 84
End: 2019-03-01

## 2019-03-01 VITALS
HEART RATE: 59 BPM | DIASTOLIC BLOOD PRESSURE: 70 MMHG | RESPIRATION RATE: 17 BRPM | HEIGHT: 72 IN | BODY MASS INDEX: 21.4 KG/M2 | WEIGHT: 158 LBS | SYSTOLIC BLOOD PRESSURE: 130 MMHG | OXYGEN SATURATION: 96 % | TEMPERATURE: 97.6 F

## 2019-03-01 DIAGNOSIS — C71.9 GLIOBLASTOMA MULTIFORME (HCC): Primary | ICD-10-CM

## 2019-03-01 DIAGNOSIS — I25.10 CHRONIC CORONARY ARTERY DISEASE: ICD-10-CM

## 2019-03-01 DIAGNOSIS — R31.0 GROSS HEMATURIA: ICD-10-CM

## 2019-03-01 DIAGNOSIS — R47.89 WORD FINDING DIFFICULTY: ICD-10-CM

## 2019-03-01 DIAGNOSIS — C90.00 MULTIPLE MYELOMA NOT HAVING ACHIEVED REMISSION (HCC): ICD-10-CM

## 2019-03-01 DIAGNOSIS — I10 ESSENTIAL HYPERTENSION: ICD-10-CM

## 2019-03-01 PROCEDURE — 99214 OFFICE O/P EST MOD 30 MIN: CPT | Performed by: INTERNAL MEDICINE

## 2019-03-01 RX ORDER — LEVETIRACETAM 500 MG/1
500 TABLET ORAL EVERY MORNING
Status: ON HOLD | COMMUNITY
End: 2020-01-01 | Stop reason: SDUPTHER

## 2019-03-01 RX ORDER — DEXAMETHASONE 2 MG/1
2 TABLET ORAL
COMMUNITY
End: 2019-08-23

## 2019-03-01 RX ORDER — FAMOTIDINE 20 MG/1
20 TABLET, FILM COATED ORAL DAILY
COMMUNITY

## 2019-03-06 ENCOUNTER — TELEPHONE (OUTPATIENT)
Dept: INTERNAL MEDICINE | Facility: CLINIC | Age: 84
End: 2019-03-06

## 2019-03-06 ENCOUNTER — APPOINTMENT (OUTPATIENT)
Dept: SPEECH THERAPY | Facility: HOSPITAL | Age: 84
End: 2019-03-06

## 2019-03-06 RX ORDER — TELMISARTAN 40 MG/1
40 TABLET ORAL DAILY
Qty: 90 TABLET | Refills: 1 | Status: SHIPPED | OUTPATIENT
Start: 2019-03-06 | End: 2019-05-07

## 2019-03-06 NOTE — TELEPHONE ENCOUNTER
Pt losartan was recalled    Dr Parker advised switch to micardis    rx sent to pharmacy and pt wife notified

## 2019-03-08 ENCOUNTER — APPOINTMENT (OUTPATIENT)
Dept: SPEECH THERAPY | Facility: HOSPITAL | Age: 84
End: 2019-03-08

## 2019-03-13 ENCOUNTER — APPOINTMENT (OUTPATIENT)
Dept: SPEECH THERAPY | Facility: HOSPITAL | Age: 84
End: 2019-03-13

## 2019-03-15 ENCOUNTER — APPOINTMENT (OUTPATIENT)
Dept: SPEECH THERAPY | Facility: HOSPITAL | Age: 84
End: 2019-03-15

## 2019-03-22 ENCOUNTER — TELEPHONE (OUTPATIENT)
Dept: INTERNAL MEDICINE | Facility: CLINIC | Age: 84
End: 2019-03-22

## 2019-03-22 NOTE — TELEPHONE ENCOUNTER
Spoke with patient's wife on the phone, Carli Haines.  She states that the patient has been on telmisartan, having been switched over from losartan due to the recalls.  He has been taking the telmisartan since 3/6/2019.  Yesterday and today, the patient has developed increasingly slurred speech, diarrhea, red face, some blurred vision intermittently.  He does have some baseline slurred speech, but his wife states that it was worse yesterday and is better today.  She wonders if this might be due to the change in the blood pressure medication.  I advised her that based upon his symptoms I would have him proceed to the emergency department for further evaluation due to the increasing slurred speech and visual changes.  She acknowledges this and states that she does not feel that it is a stroke, and does not feel that she needs to send him to the emergency room.  She states that he has been having a lot of diarrhea, and she feels that it is more related to this.  I told her that I would still have him go to the emergency room to be evaluated.  She acknowledges this and states that she is going to hold his blood pressure medication over the weekend and watch his blood pressure closely for any spikes.  They are going to call Monday and let us know how he is doing.  I again educated that I would have her go to the emergency room with him to be evaluated.

## 2019-03-24 NOTE — PROGRESS NOTES
Subjective   Silas Haines is a 83 y.o. male.   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 multiple myeloma which is not in remission hypertension chronic CAD word finding difficulty and gross hematuria  History of Present Illness   He is here today follow-up for glioblastoma multiforme he which is stable as much as can be expected along with word finding difficulty which is about the same multiple myeloma which is not in remission hypertension which stable on current medication and chronic CAD which is stable with no chest pain and gross hematuria which is not stable  The following portions of the patient's history were reviewed and updated as appropriate: allergies, current medications, past family history, past medical history, past social history, past surgical history and problem list.    Review of Systems   Constitutional: Negative for fatigue.   Genitourinary: Positive for hematuria.   Neurological: Negative for weakness.        Word finding difficulty   Psychiatric/Behavioral: Negative for dysphoric mood.   All other systems reviewed and are negative.      Objective   Physical Exam   Constitutional: He is oriented to person, place, and time. He appears well-developed and well-nourished. He is cooperative.   HENT:   Head: Normocephalic and atraumatic.   Right Ear: Hearing, tympanic membrane, external ear and ear canal normal.   Left Ear: Hearing, tympanic membrane, external ear and ear canal normal.   Nose: Nose normal.   Mouth/Throat: Uvula is midline, oropharynx is clear and moist and mucous membranes are normal.   Eyes: Conjunctivae, EOM and lids are normal. Pupils are equal, round, and reactive to light.   Neck: Phonation normal. Neck supple. Carotid bruit is not present.   Cardiovascular: Normal rate, regular rhythm and normal heart sounds. Exam reveals no gallop and no friction rub.   No murmur heard.  Pulmonary/Chest: Effort normal and breath sounds  normal. No respiratory distress.   Abdominal: Soft. Bowel sounds are normal. He exhibits no distension and no mass. There is no hepatosplenomegaly. There is no tenderness. There is no rebound and no guarding. No hernia.   Musculoskeletal: He exhibits no edema.   Neurological: He is alert and oriented to person, place, and time. Coordination and gait normal.   Skin: Skin is warm and dry.   Psychiatric: He has a normal mood and affect. His speech is normal and behavior is normal. Judgment and thought content normal.   Nursing note and vitals reviewed.      Assessment/Plan   Diagnoses and all orders for this visit:    Glioblastoma multiforme (CMS/HCC)/ left side    Multiple myeloma not having achieved remission (CMS/HCC)    Essential hypertension    Chronic coronary artery disease    Word finding difficulty    Gross hematuria  -     Ambulatory Referral to Urology      Glioblastoma multiform he on left side this is the cause of his word finding difficulty and about the same  Multiple myeloma not in remission but stable nonetheless  Hypertension well-controlled on current medication no changes  CAD stable with no evidence of chest pain  Word finding difficulty about the same as before from his multiple problems including glioblastoma multiforme

## 2019-05-07 ENCOUNTER — APPOINTMENT (OUTPATIENT)
Dept: PREADMISSION TESTING | Facility: HOSPITAL | Age: 84
End: 2019-05-07

## 2019-05-07 VITALS
OXYGEN SATURATION: 97 % | TEMPERATURE: 98.1 F | HEIGHT: 72 IN | DIASTOLIC BLOOD PRESSURE: 77 MMHG | WEIGHT: 154 LBS | RESPIRATION RATE: 20 BRPM | HEART RATE: 77 BPM | BODY MASS INDEX: 20.86 KG/M2 | SYSTOLIC BLOOD PRESSURE: 153 MMHG

## 2019-05-07 LAB
ANION GAP SERPL CALCULATED.3IONS-SCNC: 8.2 MMOL/L
ANISOCYTOSIS BLD QL: ABNORMAL
BUN BLD-MCNC: 30 MG/DL (ref 8–23)
BUN/CREAT SERPL: 23.4 (ref 7–25)
CALCIUM SPEC-SCNC: 9.1 MG/DL (ref 8.6–10.5)
CHLORIDE SERPL-SCNC: 101 MMOL/L (ref 98–107)
CO2 SERPL-SCNC: 24.8 MMOL/L (ref 22–29)
CREAT BLD-MCNC: 1.28 MG/DL (ref 0.76–1.27)
DEPRECATED RDW RBC AUTO: 70.5 FL (ref 37–54)
ERYTHROCYTE [DISTWIDTH] IN BLOOD BY AUTOMATED COUNT: 16.3 % (ref 12.3–15.4)
GFR SERPL CREATININE-BSD FRML MDRD: 54 ML/MIN/1.73
GLUCOSE BLD-MCNC: 123 MG/DL (ref 65–99)
HCT VFR BLD AUTO: 27 % (ref 37.5–51)
HGB BLD-MCNC: 8.5 G/DL (ref 13–17.7)
LYMPHOCYTES # BLD MANUAL: 0.51 10*3/MM3 (ref 0.7–3.1)
LYMPHOCYTES NFR BLD MANUAL: 14 % (ref 19.6–45.3)
LYMPHOCYTES NFR BLD MANUAL: 6 % (ref 5–12)
MACROCYTES BLD QL SMEAR: ABNORMAL
MCH RBC QN AUTO: 37.3 PG (ref 26.6–33)
MCHC RBC AUTO-ENTMCNC: 31.5 G/DL (ref 31.5–35.7)
MCV RBC AUTO: 118.4 FL (ref 79–97)
MONOCYTES # BLD AUTO: 0.22 10*3/MM3 (ref 0.1–0.9)
NEUTROPHILS # BLD AUTO: 2.92 10*3/MM3 (ref 1.7–7)
NEUTROPHILS NFR BLD MANUAL: 80 % (ref 42.7–76)
PLAT MORPH BLD: NORMAL
PLATELET # BLD AUTO: 73 10*3/MM3 (ref 140–450)
PMV BLD AUTO: 10.2 FL (ref 6–12)
POLYCHROMASIA BLD QL SMEAR: ABNORMAL
POTASSIUM BLD-SCNC: 4.2 MMOL/L (ref 3.5–5.2)
RBC # BLD AUTO: 2.28 10*6/MM3 (ref 4.14–5.8)
SODIUM BLD-SCNC: 134 MMOL/L (ref 136–145)
WBC MORPH BLD: NORMAL
WBC NRBC COR # BLD: 3.65 10*3/MM3 (ref 3.4–10.8)

## 2019-05-07 PROCEDURE — 85025 COMPLETE CBC W/AUTO DIFF WBC: CPT | Performed by: UROLOGY

## 2019-05-07 PROCEDURE — 36415 COLL VENOUS BLD VENIPUNCTURE: CPT

## 2019-05-07 PROCEDURE — 93010 ELECTROCARDIOGRAM REPORT: CPT | Performed by: INTERNAL MEDICINE

## 2019-05-07 PROCEDURE — 85007 BL SMEAR W/DIFF WBC COUNT: CPT | Performed by: UROLOGY

## 2019-05-07 PROCEDURE — 80048 BASIC METABOLIC PNL TOTAL CA: CPT | Performed by: UROLOGY

## 2019-05-07 PROCEDURE — 93005 ELECTROCARDIOGRAM TRACING: CPT

## 2019-05-07 RX ORDER — DEXAMETHASONE 1 MG
0.5 TABLET ORAL
Status: ON HOLD | COMMUNITY
End: 2020-01-01 | Stop reason: SDUPTHER

## 2019-05-07 RX ORDER — SENNOSIDES 8.6 MG
1 TABLET ORAL DAILY PRN
COMMUNITY
End: 2020-01-01

## 2019-05-07 RX ORDER — ATORVASTATIN CALCIUM 20 MG/1
20 TABLET, FILM COATED ORAL NIGHTLY
COMMUNITY
End: 2019-09-09 | Stop reason: SDUPTHER

## 2019-05-07 RX ORDER — ACETAMINOPHEN 500 MG
500 TABLET ORAL EVERY 6 HOURS PRN
COMMUNITY

## 2019-05-07 RX ORDER — LEVETIRACETAM 750 MG/1
750 TABLET ORAL NIGHTLY
COMMUNITY
End: 2019-08-23

## 2019-05-07 RX ORDER — ERGOCALCIFEROL (VITAMIN D2) 50 MCG
1 CAPSULE ORAL DAILY
COMMUNITY

## 2019-05-07 NOTE — DISCHARGE INSTRUCTIONS
Take the following medications the morning of surgery with a small sip of water:    keppra    General Instructions:  • Do not eat or drink anything after midnight the night before surgery.  • Infants may have breast milk up to four hours before surgery.  • Infants drinking formula may drink formula up to six hours before surgery.   • Patients who avoid smoking, chewing tobacco and alcohol for 4 weeks prior to surgery have a reduced risk of post-operative complications.  Quit smoking as many days before surgery as you can.  • Do not smoke, use chewing tobacco or drink alcohol the day of surgery.   • If applicable bring your C-PAP/ BI-PAP machine.  • Bring any papers given to you in the doctor’s office.  • Wear clean comfortable clothes and socks.  • Do not wear contact lenses, false eyelashes or make-up.  Bring a case for your glasses.   • Bring crutches or walker if applicable.  • Remove all piercings.  Leave jewelry and any other valuables at home.  • Hair extensions with metal clips must be removed prior to surgery.  • The Pre-Admission Testing nurse will instruct you to bring medications if unable to obtain an accurate list in Pre-Admission Testing.        If you were given a blood bank ID arm band remember to bring it with you the day of surgery.    Preventing a Surgical Site Infection:  • For 2 to 3 days before surgery, avoid shaving with a razor because the razor can irritate skin and make it easier to develop an infection.    • Any areas of open skin can increase the risk of a post-operative wound infection by allowing bacteria to enter and travel throughout the body.  Notify your surgeon if you have any skin wounds / rashes even if it is not near the expected surgical site.  The area will need assessed to determine if surgery should be delayed until it is healed.  • The night prior to surgery sleep in a clean bed with clean clothing.  Do not allow pets to sleep with you.  • Shower on the morning of surgery  using a fresh bar of anti-bacterial soap (such as Dial) and clean washcloth.  Dry with a clean towel and dress in clean clothing.  • Ask your surgeon if you will be receiving antibiotics prior to surgery.  • Make sure you, your family, and all healthcare providers clean their hands with soap and water or an alcohol based hand  before caring for you or your wound.    Day of surgery:5/14/19   0730  Upon arrival, a Pre-op nurse and Anesthesiologist will review your health history, obtain vital signs, and answer questions you may have.  The only belongings needed at this time will be your home medications and if applicable your C-PAP/BI-PAP machine.  If you are staying overnight your family can leave the rest of your belongings in the car and bring them to your room later.  A Pre-op nurse will start an IV and you may receive medication in preparation for surgery, including something to help you relax.  Your family will be able to see you in the Pre-op area.  While you are in surgery your family should notify the waiting room  if they leave the waiting room area and provide a contact phone number.    Please be aware that surgery does come with discomfort.  We want to make every effort to control your discomfort so please discuss any uncontrolled symptoms with your nurse.   Your doctor will most likely have prescribed pain medications.      If you are going home after surgery you will receive individualized written care instructions before being discharged.  A responsible adult must drive you to and from the hospital on the day of your surgery and stay with you for 24 hours.    If you are staying overnight following surgery, you will be transported to your hospital room following the recovery period.   has all private rooms.    You have received a list of surgical assistants for your reference.  If you have any questions please call Pre-Admission Testing at  [phone].  Deductibles and co-payments are collected on the day of service. Please be prepared to pay the required co-pay, deductible or deposit on the day of service as defined by your plan.

## 2019-05-15 RX ORDER — CEFTRIAXONE 1 G/50ML
1 INJECTION, SOLUTION INTRAVENOUS ONCE
Status: CANCELLED | OUTPATIENT
Start: 2019-05-16

## 2019-05-16 ENCOUNTER — HOSPITAL ENCOUNTER (OUTPATIENT)
Facility: HOSPITAL | Age: 84
Discharge: HOME | End: 2019-05-17
Attending: UROLOGY | Admitting: UROLOGY

## 2019-05-16 ENCOUNTER — ANESTHESIA EVENT (OUTPATIENT)
Dept: PERIOP | Facility: HOSPITAL | Age: 84
End: 2019-05-16

## 2019-05-16 ENCOUNTER — ANESTHESIA (OUTPATIENT)
Dept: PERIOP | Facility: HOSPITAL | Age: 84
End: 2019-05-16

## 2019-05-16 DIAGNOSIS — D49.4 BLADDER TUMOR: Primary | ICD-10-CM

## 2019-05-16 LAB
ABO GROUP BLD: NORMAL
BLD GP AB SCN SERPL QL: NEGATIVE
RH BLD: POSITIVE
T&S EXPIRATION DATE: NORMAL

## 2019-05-16 PROCEDURE — 86850 RBC ANTIBODY SCREEN: CPT | Performed by: ANESTHESIOLOGY

## 2019-05-16 PROCEDURE — 25010000002 HYDROMORPHONE PER 4 MG: Performed by: UROLOGY

## 2019-05-16 PROCEDURE — 25010000002 FENTANYL CITRATE (PF) 100 MCG/2ML SOLUTION: Performed by: ANESTHESIOLOGY

## 2019-05-16 PROCEDURE — G0378 HOSPITAL OBSERVATION PER HR: HCPCS

## 2019-05-16 PROCEDURE — 86900 BLOOD TYPING SEROLOGIC ABO: CPT | Performed by: ANESTHESIOLOGY

## 2019-05-16 PROCEDURE — 25010000002 PROPOFOL 10 MG/ML EMULSION: Performed by: ANESTHESIOLOGY

## 2019-05-16 PROCEDURE — 88307 TISSUE EXAM BY PATHOLOGIST: CPT | Performed by: UROLOGY

## 2019-05-16 PROCEDURE — 86901 BLOOD TYPING SEROLOGIC RH(D): CPT | Performed by: ANESTHESIOLOGY

## 2019-05-16 PROCEDURE — 25010000002 ONDANSETRON PER 1 MG: Performed by: ANESTHESIOLOGY

## 2019-05-16 PROCEDURE — 25010000002 SUCCINYLCHOLINE PER 20 MG: Performed by: ANESTHESIOLOGY

## 2019-05-16 PROCEDURE — 25010000002 HYDROMORPHONE PER 4 MG: Performed by: ANESTHESIOLOGY

## 2019-05-16 RX ORDER — SODIUM CHLORIDE 0.9 % (FLUSH) 0.9 %
3 SYRINGE (ML) INJECTION EVERY 12 HOURS SCHEDULED
Status: DISCONTINUED | OUTPATIENT
Start: 2019-05-16 | End: 2019-05-16 | Stop reason: HOSPADM

## 2019-05-16 RX ORDER — NALOXONE HCL 0.4 MG/ML
0.2 VIAL (ML) INJECTION AS NEEDED
Status: DISCONTINUED | OUTPATIENT
Start: 2019-05-16 | End: 2019-05-16 | Stop reason: HOSPADM

## 2019-05-16 RX ORDER — LABETALOL HYDROCHLORIDE 5 MG/ML
5 INJECTION, SOLUTION INTRAVENOUS
Status: DISCONTINUED | OUTPATIENT
Start: 2019-05-16 | End: 2019-05-16 | Stop reason: HOSPADM

## 2019-05-16 RX ORDER — DOCUSATE SODIUM 100 MG/1
100 CAPSULE, LIQUID FILLED ORAL DAILY PRN
Status: DISCONTINUED | OUTPATIENT
Start: 2019-05-16 | End: 2019-05-17 | Stop reason: HOSPADM

## 2019-05-16 RX ORDER — LEVETIRACETAM 500 MG/1
500 TABLET ORAL 2 TIMES DAILY
Status: DISCONTINUED | OUTPATIENT
Start: 2019-05-16 | End: 2019-05-17 | Stop reason: HOSPADM

## 2019-05-16 RX ORDER — ACETAMINOPHEN 325 MG/1
650 TABLET ORAL ONCE AS NEEDED
Status: DISCONTINUED | OUTPATIENT
Start: 2019-05-16 | End: 2019-05-16 | Stop reason: HOSPADM

## 2019-05-16 RX ORDER — SODIUM CHLORIDE, SODIUM LACTATE, POTASSIUM CHLORIDE, CALCIUM CHLORIDE 600; 310; 30; 20 MG/100ML; MG/100ML; MG/100ML; MG/100ML
INJECTION, SOLUTION INTRAVENOUS CONTINUOUS PRN
Status: DISCONTINUED | OUTPATIENT
Start: 2019-05-16 | End: 2019-05-16 | Stop reason: SURG

## 2019-05-16 RX ORDER — DIPHENHYDRAMINE HYDROCHLORIDE 50 MG/ML
12.5 INJECTION INTRAMUSCULAR; INTRAVENOUS
Status: DISCONTINUED | OUTPATIENT
Start: 2019-05-16 | End: 2019-05-16 | Stop reason: HOSPADM

## 2019-05-16 RX ORDER — ROCURONIUM BROMIDE 10 MG/ML
INJECTION, SOLUTION INTRAVENOUS AS NEEDED
Status: DISCONTINUED | OUTPATIENT
Start: 2019-05-16 | End: 2019-05-16 | Stop reason: SURG

## 2019-05-16 RX ORDER — DEXAMETHASONE 2 MG/1
2 TABLET ORAL
Status: DISCONTINUED | OUTPATIENT
Start: 2019-05-17 | End: 2019-05-17 | Stop reason: HOSPADM

## 2019-05-16 RX ORDER — FENTANYL CITRATE 50 UG/ML
INJECTION, SOLUTION INTRAMUSCULAR; INTRAVENOUS AS NEEDED
Status: DISCONTINUED | OUTPATIENT
Start: 2019-05-16 | End: 2019-05-16 | Stop reason: SURG

## 2019-05-16 RX ORDER — FAMOTIDINE 20 MG/1
20 TABLET, FILM COATED ORAL DAILY
Status: DISCONTINUED | OUTPATIENT
Start: 2019-05-17 | End: 2019-05-17 | Stop reason: HOSPADM

## 2019-05-16 RX ORDER — ONDANSETRON 2 MG/ML
4 INJECTION INTRAMUSCULAR; INTRAVENOUS EVERY 6 HOURS PRN
Status: DISCONTINUED | OUTPATIENT
Start: 2019-05-16 | End: 2019-05-17 | Stop reason: HOSPADM

## 2019-05-16 RX ORDER — HYDROCODONE BITARTRATE AND ACETAMINOPHEN 5; 325 MG/1; MG/1
1 TABLET ORAL EVERY 4 HOURS PRN
Status: DISCONTINUED | OUTPATIENT
Start: 2019-05-16 | End: 2019-05-17 | Stop reason: HOSPADM

## 2019-05-16 RX ORDER — PROMETHAZINE HYDROCHLORIDE 25 MG/ML
6.25 INJECTION, SOLUTION INTRAMUSCULAR; INTRAVENOUS
Status: DISCONTINUED | OUTPATIENT
Start: 2019-05-16 | End: 2019-05-16 | Stop reason: HOSPADM

## 2019-05-16 RX ORDER — PROPOFOL 10 MG/ML
VIAL (ML) INTRAVENOUS AS NEEDED
Status: DISCONTINUED | OUTPATIENT
Start: 2019-05-16 | End: 2019-05-16 | Stop reason: SURG

## 2019-05-16 RX ORDER — SENNOSIDES 8.6 MG
1 TABLET ORAL DAILY PRN
Status: DISCONTINUED | OUTPATIENT
Start: 2019-05-16 | End: 2019-05-17 | Stop reason: HOSPADM

## 2019-05-16 RX ORDER — OXYCODONE AND ACETAMINOPHEN 7.5; 325 MG/1; MG/1
1 TABLET ORAL ONCE AS NEEDED
Status: DISCONTINUED | OUTPATIENT
Start: 2019-05-16 | End: 2019-05-16 | Stop reason: HOSPADM

## 2019-05-16 RX ORDER — HYDROCODONE BITARTRATE AND ACETAMINOPHEN 7.5; 325 MG/1; MG/1
1 TABLET ORAL ONCE AS NEEDED
Status: DISCONTINUED | OUTPATIENT
Start: 2019-05-16 | End: 2019-05-16 | Stop reason: HOSPADM

## 2019-05-16 RX ORDER — ISOSORBIDE MONONITRATE 30 MG/1
30 TABLET, EXTENDED RELEASE ORAL EVERY EVENING
Status: DISCONTINUED | OUTPATIENT
Start: 2019-05-16 | End: 2019-05-17 | Stop reason: HOSPADM

## 2019-05-16 RX ORDER — CLINDAMYCIN PHOSPHATE 600 MG/50ML
600 INJECTION INTRAVENOUS ONCE
Status: COMPLETED | OUTPATIENT
Start: 2019-05-16 | End: 2019-05-16

## 2019-05-16 RX ORDER — FAMOTIDINE 10 MG/ML
20 INJECTION, SOLUTION INTRAVENOUS ONCE
Status: COMPLETED | OUTPATIENT
Start: 2019-05-16 | End: 2019-05-16

## 2019-05-16 RX ORDER — SODIUM CHLORIDE 0.9 % (FLUSH) 0.9 %
3-10 SYRINGE (ML) INJECTION AS NEEDED
Status: DISCONTINUED | OUTPATIENT
Start: 2019-05-16 | End: 2019-05-16 | Stop reason: HOSPADM

## 2019-05-16 RX ORDER — HYDRALAZINE HYDROCHLORIDE 20 MG/ML
5 INJECTION INTRAMUSCULAR; INTRAVENOUS
Status: DISCONTINUED | OUTPATIENT
Start: 2019-05-16 | End: 2019-05-16 | Stop reason: HOSPADM

## 2019-05-16 RX ORDER — ONDANSETRON 2 MG/ML
4 INJECTION INTRAMUSCULAR; INTRAVENOUS ONCE AS NEEDED
Status: DISCONTINUED | OUTPATIENT
Start: 2019-05-16 | End: 2019-05-16 | Stop reason: HOSPADM

## 2019-05-16 RX ORDER — DIPHENHYDRAMINE HCL 25 MG
25 CAPSULE ORAL
Status: DISCONTINUED | OUTPATIENT
Start: 2019-05-16 | End: 2019-05-16 | Stop reason: HOSPADM

## 2019-05-16 RX ORDER — ONDANSETRON 2 MG/ML
INJECTION INTRAMUSCULAR; INTRAVENOUS AS NEEDED
Status: DISCONTINUED | OUTPATIENT
Start: 2019-05-16 | End: 2019-05-16 | Stop reason: SURG

## 2019-05-16 RX ORDER — ONDANSETRON 4 MG/1
4 TABLET, FILM COATED ORAL EVERY 6 HOURS PRN
Status: DISCONTINUED | OUTPATIENT
Start: 2019-05-16 | End: 2019-05-17 | Stop reason: HOSPADM

## 2019-05-16 RX ORDER — MAGNESIUM HYDROXIDE 1200 MG/15ML
LIQUID ORAL AS NEEDED
Status: DISCONTINUED | OUTPATIENT
Start: 2019-05-16 | End: 2019-05-16 | Stop reason: HOSPADM

## 2019-05-16 RX ORDER — SODIUM CHLORIDE 9 MG/ML
50 INJECTION, SOLUTION INTRAVENOUS CONTINUOUS
Status: DISCONTINUED | OUTPATIENT
Start: 2019-05-16 | End: 2019-05-17 | Stop reason: HOSPADM

## 2019-05-16 RX ORDER — ATORVASTATIN CALCIUM 20 MG/1
20 TABLET, FILM COATED ORAL NIGHTLY
Status: DISCONTINUED | OUTPATIENT
Start: 2019-05-17 | End: 2019-05-17 | Stop reason: HOSPADM

## 2019-05-16 RX ORDER — NALOXONE HCL 0.4 MG/ML
0.1 VIAL (ML) INJECTION
Status: DISCONTINUED | OUTPATIENT
Start: 2019-05-16 | End: 2019-05-17 | Stop reason: HOSPADM

## 2019-05-16 RX ORDER — FLUMAZENIL 0.1 MG/ML
0.2 INJECTION INTRAVENOUS AS NEEDED
Status: DISCONTINUED | OUTPATIENT
Start: 2019-05-16 | End: 2019-05-16 | Stop reason: HOSPADM

## 2019-05-16 RX ORDER — SODIUM CHLORIDE, SODIUM LACTATE, POTASSIUM CHLORIDE, CALCIUM CHLORIDE 600; 310; 30; 20 MG/100ML; MG/100ML; MG/100ML; MG/100ML
9 INJECTION, SOLUTION INTRAVENOUS CONTINUOUS
Status: DISCONTINUED | OUTPATIENT
Start: 2019-05-16 | End: 2019-05-16 | Stop reason: HOSPADM

## 2019-05-16 RX ORDER — SUCCINYLCHOLINE CHLORIDE 20 MG/ML
INJECTION INTRAMUSCULAR; INTRAVENOUS AS NEEDED
Status: DISCONTINUED | OUTPATIENT
Start: 2019-05-16 | End: 2019-05-16 | Stop reason: SURG

## 2019-05-16 RX ORDER — ATROPA BELLADONNA AND OPIUM 16.2; 3 MG/1; MG/1
60 SUPPOSITORY RECTAL EVERY 8 HOURS PRN
Status: DISCONTINUED | OUTPATIENT
Start: 2019-05-16 | End: 2019-05-17 | Stop reason: HOSPADM

## 2019-05-16 RX ORDER — PROMETHAZINE HYDROCHLORIDE 25 MG/ML
12.5 INJECTION, SOLUTION INTRAMUSCULAR; INTRAVENOUS ONCE AS NEEDED
Status: DISCONTINUED | OUTPATIENT
Start: 2019-05-16 | End: 2019-05-16 | Stop reason: HOSPADM

## 2019-05-16 RX ORDER — FENTANYL CITRATE 50 UG/ML
INJECTION, SOLUTION INTRAMUSCULAR; INTRAVENOUS
Status: COMPLETED
Start: 2019-05-16 | End: 2019-05-16

## 2019-05-16 RX ORDER — EPHEDRINE SULFATE 50 MG/ML
5 INJECTION, SOLUTION INTRAVENOUS ONCE AS NEEDED
Status: DISCONTINUED | OUTPATIENT
Start: 2019-05-16 | End: 2019-05-16 | Stop reason: HOSPADM

## 2019-05-16 RX ORDER — DEXAMETHASONE 0.5 MG/1
1 TABLET ORAL
Status: DISCONTINUED | OUTPATIENT
Start: 2019-05-17 | End: 2019-05-17 | Stop reason: HOSPADM

## 2019-05-16 RX ORDER — FENTANYL CITRATE 50 UG/ML
50 INJECTION, SOLUTION INTRAMUSCULAR; INTRAVENOUS
Status: DISCONTINUED | OUTPATIENT
Start: 2019-05-16 | End: 2019-05-16 | Stop reason: HOSPADM

## 2019-05-16 RX ORDER — PROMETHAZINE HYDROCHLORIDE 25 MG/1
25 SUPPOSITORY RECTAL ONCE AS NEEDED
Status: DISCONTINUED | OUTPATIENT
Start: 2019-05-16 | End: 2019-05-16 | Stop reason: HOSPADM

## 2019-05-16 RX ORDER — HYDROMORPHONE HYDROCHLORIDE 1 MG/ML
0.5 INJECTION, SOLUTION INTRAMUSCULAR; INTRAVENOUS; SUBCUTANEOUS
Status: DISCONTINUED | OUTPATIENT
Start: 2019-05-16 | End: 2019-05-17 | Stop reason: HOSPADM

## 2019-05-16 RX ORDER — PROMETHAZINE HYDROCHLORIDE 25 MG/1
25 TABLET ORAL ONCE AS NEEDED
Status: DISCONTINUED | OUTPATIENT
Start: 2019-05-16 | End: 2019-05-16 | Stop reason: HOSPADM

## 2019-05-16 RX ORDER — HYDROMORPHONE HYDROCHLORIDE 1 MG/ML
0.5 INJECTION, SOLUTION INTRAMUSCULAR; INTRAVENOUS; SUBCUTANEOUS
Status: DISCONTINUED | OUTPATIENT
Start: 2019-05-16 | End: 2019-05-16 | Stop reason: HOSPADM

## 2019-05-16 RX ADMIN — SUGAMMADEX 200 MG: 100 INJECTION, SOLUTION INTRAVENOUS at 20:13

## 2019-05-16 RX ADMIN — FENTANYL CITRATE 50 MCG: 50 INJECTION, SOLUTION INTRAMUSCULAR; INTRAVENOUS at 20:47

## 2019-05-16 RX ADMIN — HYDROMORPHONE HYDROCHLORIDE 0.5 MG: 1 INJECTION, SOLUTION INTRAMUSCULAR; INTRAVENOUS; SUBCUTANEOUS at 23:23

## 2019-05-16 RX ADMIN — LABETALOL 20 MG/4 ML (5 MG/ML) INTRAVENOUS SYRINGE 5 MG: at 21:43

## 2019-05-16 RX ADMIN — SODIUM CHLORIDE, POTASSIUM CHLORIDE, SODIUM LACTATE AND CALCIUM CHLORIDE 9 ML/HR: 600; 310; 30; 20 INJECTION, SOLUTION INTRAVENOUS at 15:21

## 2019-05-16 RX ADMIN — SUCCINYLCHOLINE CHLORIDE 120 MG: 20 INJECTION, SOLUTION INTRAMUSCULAR; INTRAVENOUS; PARENTERAL at 19:07

## 2019-05-16 RX ADMIN — FAMOTIDINE 20 MG: 10 INJECTION INTRAVENOUS at 15:21

## 2019-05-16 RX ADMIN — PROPOFOL 110 MG: 10 INJECTION, EMULSION INTRAVENOUS at 19:07

## 2019-05-16 RX ADMIN — HYDROMORPHONE HYDROCHLORIDE 0.5 MG: 1 INJECTION, SOLUTION INTRAMUSCULAR; INTRAVENOUS; SUBCUTANEOUS at 21:18

## 2019-05-16 RX ADMIN — FENTANYL CITRATE 50 MCG: 50 INJECTION, SOLUTION INTRAMUSCULAR; INTRAVENOUS at 20:31

## 2019-05-16 RX ADMIN — SODIUM CHLORIDE 50 ML/HR: 9 INJECTION, SOLUTION INTRAVENOUS at 23:13

## 2019-05-16 RX ADMIN — ONDANSETRON 4 MG: 2 INJECTION INTRAMUSCULAR; INTRAVENOUS at 20:07

## 2019-05-16 RX ADMIN — FENTANYL CITRATE 50 MCG: 50 INJECTION INTRAMUSCULAR; INTRAVENOUS at 19:21

## 2019-05-16 RX ADMIN — SODIUM CHLORIDE, POTASSIUM CHLORIDE, SODIUM LACTATE AND CALCIUM CHLORIDE: 600; 310; 30; 20 INJECTION, SOLUTION INTRAVENOUS at 19:07

## 2019-05-16 RX ADMIN — HYDROMORPHONE HYDROCHLORIDE 0.5 MG: 1 INJECTION, SOLUTION INTRAMUSCULAR; INTRAVENOUS; SUBCUTANEOUS at 21:56

## 2019-05-16 RX ADMIN — ROCURONIUM BROMIDE 40 MG: 10 INJECTION INTRAVENOUS at 19:26

## 2019-05-16 RX ADMIN — CLINDAMYCIN PHOSPHATE 600 MG: 12 INJECTION, SOLUTION INTRAVENOUS at 19:13

## 2019-05-16 NOTE — PERIOPERATIVE NURSING NOTE
"Spoke to Nasim in PACU to pass on the pt is blind in left eye, pt goes by \"Short\", wife has hearng aides pt is [place], pt answers better to yes and no questions, has trouble finding words due to brain tumor.  Also that pt has an elastic belt holding pad in place in elvin area.  Please do not throw away -wife wants belt for future use.  "

## 2019-05-16 NOTE — ANESTHESIA PREPROCEDURE EVALUATION
Anesthesia Evaluation                  Airway   Mallampati: II  no difficulty expected  Dental - normal exam     Pulmonary - normal exam   (+) pneumonia ,     PE comment: nonlabored  Cardiovascular - normal exam    Rhythm: regular  Rate: normal    (+) hypertension, CAD, CABG (bypass grafts patent as of Nov 2018 cath), hyperlipidemia,       Neuro/Psych  (+) numbness,       ROS Comment: Glioblastoma Multiforme;  (+)tremors;  On keppra for seizure ppx-- significant word finding difficulty at baseline  GI/Hepatic/Renal/Endo    (+)   renal disease stones,     Musculoskeletal     (+) arthralgias,   Abdominal    Substance History      OB/GYN          Other   (+) blood dyscrasia (pancytopenia), arthritis   history of cancer (multiple)      Other Comment: Multiple myeloma                Anesthesia Plan    ASA 4     general   (Pt scheduled for SAB but due to intracranial mass, would plan GETA instead.)  intravenous induction   Anesthetic plan, all risks, benefits, and alternatives have been provided, discussed and informed consent has been obtained with: patient.

## 2019-05-16 NOTE — H&P
FIRST UROLOGY CONSULT      Patient Identification:  NAME:  Silas Haines  Age:  84 y.o.   Sex:  male   :  1935   MRN:  [id number]       Chief complaint: Bladder tumor    History of present illness:  This is a 84 year old man with a history of GBM s/p radiation therapy who developed GH and was found to have a bladder tumor. He presents for TURBT.      Past medical history:  Past Medical History:   Diagnosis Date   • Anemia    • Arthritis    • Bladder cancer (CMS/HCC)     high grade treated with BCG x 6 and surveillance cystoscopies (Dr. Michael Berg)   • Bladder stone    • Bladder tumor    • Blind left eye    • CAD (coronary artery disease)     CABG    X 2 VESSEL   • Hematuria    • History of radiation therapy    • History of shingles 10/2015   • History of skin cancer 2016    SQUAMOUS CELL SCALP WITH RADIATION   • History of transfusion     no reaction   • Hyperlipidemia    • Hypertension    • Kidney stones    • Multiple myeloma (CMS/HCC) 2014    taking po chemotherapy   • Neuralgia of flank, left 02/10/2015    FROM SHINGLES   • Osteoporosis    • Pancytopenia (CMS/HCC)    • PHN (postherpetic neuralgia)    • Prostate cancer (CMS/HCC)     treated with 1 dose of Lupron and intraprostatic radiation    • Skin cancer    • Twitching     episodes       Past surgical history:  Past Surgical History:   Procedure Laterality Date   • BLADDER SURGERY N/A     x3   • BONE MARROW BIOPSY  2014   • BRAIN BIOPSY     • CATARACT EXTRACTION Bilateral     LT 2015    RT 3-3-2015   • COLONOSCOPY     • CORONARY ARTERY BYPASS GRAFT  06/27/2003    x3   • CYSTOSCOPY N/A 2011    for surveillance of bladder cancer, Dr. Michael Berg   • EYE SURGERY     • FRACTURE SURGERY      lt hip   • HIP PINNING Right 2017    Dr. Donavan Delgadillo   • INGUINAL HERNIA REPAIR Left 2017    Procedure: INGUINAL HERNIA REPAIR;  Surgeon: Jonathon Peter MD;  Location: [facility]  "[facility];  Service:    • LUMBAR PUNCTURE     • MOHS SURGERY  08/2016    top of head with skin graft from left arm   • PILONIDAL CYST DRAINAGE  1956   • PROSTATE RADIOACTIVE SEED IMPLANT N/A 01/2003    Prostate \"seeding implant\"   • TONSILLECTOMY         Allergies:  Lidocaine; Macrobid [nitrofurantoin monohyd macro]; Pamidronate; Penicillins; and Gabapentin    Home medications:  Medications Prior to Admission   Medication Sig Dispense Refill Last Dose   • acetaminophen (TYLENOL) 500 MG tablet Take 500 mg by mouth Every 6 (Six) Hours As Needed for Mild Pain .   5/16/2019 at Unknown time   • atorvastatin (LIPITOR) 20 MG tablet Take 20 mg by mouth Every Night.   5/16/2019 at Unknown time   • dexamethasone (DECADRON) 1 MG tablet Take 1 mg by mouth Daily With Breakfast.   5/16/2019 at Unknown time   • dexamethasone (DECADRON) 2 MG tablet Take 2 mg by mouth Daily With Breakfast.   5/16/2019 at Unknown time   • famotidine (PEPCID) 20 MG tablet Take 20 mg by mouth 2 (Two) Times a Day.   5/16/2019 at Unknown time   • isosorbide mononitrate (IMDUR) 30 MG 24 hr tablet Take 30 mg by mouth Every Evening.   5/15/2019 at Unknown time   • levETIRAcetam (KEPPRA) 500 MG tablet Take 500 mg by mouth 2 (Two) Times a Day.   5/16/2019 at Unknown time   • levETIRAcetam (KEPPRA) 750 MG tablet Take 750 mg by mouth Every Night.   5/15/2019 at Unknown time   • Vitamin D, Ergocalciferol, 2000 units capsule Take 1 capsule by mouth Daily.   5/16/2019 at Unknown time   • docusate sodium (COLACE) 100 MG capsule Take 100 mg by mouth Daily As Needed.   More than a month at Unknown time   • lenalidomide (REVLIMID) 10 MG capsule Take 10 mg by mouth. TAKES FOR 14 DAYS AND THEN OFF X 14 DAYS -on hold for now   7/7/2018   • Multiple Vitamin (MULTI VITAMIN MENS) tablet Take 1 tablet by mouth daily.   5/6/2019   • senna (SENOKOT) 8.6 MG tablet tablet Take 1 tablet by mouth Daily As Needed for Constipation.   More than a month at Unknown time        Hospital " medications:    sodium chloride 3 mL Intravenous Q12H       lactated ringers 9 mL/hr Last Rate: 9 mL/hr (19 1521)     sodium chloride    Family history:  Family History   Problem Relation Age of Onset   • Coronary artery disease Mother    • Hypertension Mother    • Heart failure Father    • Prostate cancer Father         Malignant neoplasm of prostate   • Malig Hyperthermia Neg Hx        Social history:  Social History     Tobacco Use   • Smoking status: Never Smoker   • Smokeless tobacco: Never Used   Substance Use Topics   • Alcohol use: No     Frequency: Never   • Drug use: No       Review of systems:    Negative 12-system ROS except for the following:  none      Objective:  TMax 24 hours:   Temp (24hrs), Av.7 °F (36.5 °C), Min:97.7 °F (36.5 °C), Max:97.7 °F (36.5 °C)      Vitals Ranges:   Temp:  [97.7 °F (36.5 °C)] 97.7 °F (36.5 °C)  Heart Rate:  [80] 80  Resp:  [18] 18  BP: (155)/(88) 155/88    Intake/Output Last 3 shifts:  No intake/output data recorded.     Physical Exam:       General Appearance:    Alert, cooperative, in no acute distress   Head:    Normocephalic, without obvious abnormality, atraumatic                                           Skin:   No bleeding, bruising or rashes   Neuro/Psych:   Orientation intact, mood/affect pleasant, no focal findings       Results review:   I reviewed the patient's new clinical results.    Data review:  Lab Results (last 24 hours)     ** No results found for the last 24 hours. **           Imaging:  Imaging Results (last 24 hours)     ** No results found for the last 24 hours. **             Assessment:       * No active hospital problems. *    Bladder tumor    Plan:     TURBT    Justo Figueroa Jr., MD  19  6:21 PM

## 2019-05-17 VITALS
WEIGHT: 147.06 LBS | HEART RATE: 82 BPM | TEMPERATURE: 100 F | BODY MASS INDEX: 19.92 KG/M2 | DIASTOLIC BLOOD PRESSURE: 86 MMHG | RESPIRATION RATE: 16 BRPM | SYSTOLIC BLOOD PRESSURE: 165 MMHG | OXYGEN SATURATION: 91 % | HEIGHT: 72 IN

## 2019-05-17 LAB
ANION GAP SERPL CALCULATED.3IONS-SCNC: 9 MMOL/L
BUN BLD-MCNC: 23 MG/DL (ref 8–23)
BUN/CREAT SERPL: 22.5 (ref 7–25)
CALCIUM SPEC-SCNC: 8.8 MG/DL (ref 8.6–10.5)
CHLORIDE SERPL-SCNC: 102 MMOL/L (ref 98–107)
CO2 SERPL-SCNC: 25 MMOL/L (ref 22–29)
CREAT BLD-MCNC: 1.02 MG/DL (ref 0.76–1.27)
DEPRECATED RDW RBC AUTO: 69.2 FL (ref 37–54)
ERYTHROCYTE [DISTWIDTH] IN BLOOD BY AUTOMATED COUNT: 15.9 % (ref 12.3–15.4)
GFR SERPL CREATININE-BSD FRML MDRD: 70 ML/MIN/1.73
GLUCOSE BLD-MCNC: 89 MG/DL (ref 65–99)
HCT VFR BLD AUTO: 23.8 % (ref 37.5–51)
HGB BLD-MCNC: 7.4 G/DL (ref 13–17.7)
MCH RBC QN AUTO: 37.2 PG (ref 26.6–33)
MCHC RBC AUTO-ENTMCNC: 31.1 G/DL (ref 31.5–35.7)
MCV RBC AUTO: 119.6 FL (ref 79–97)
PLATELET # BLD AUTO: 64 10*3/MM3 (ref 140–450)
PMV BLD AUTO: 10.3 FL (ref 6–12)
POTASSIUM BLD-SCNC: 3.8 MMOL/L (ref 3.5–5.2)
RBC # BLD AUTO: 1.99 10*6/MM3 (ref 4.14–5.8)
SODIUM BLD-SCNC: 136 MMOL/L (ref 136–145)
WBC NRBC COR # BLD: 3.66 10*3/MM3 (ref 3.4–10.8)

## 2019-05-17 PROCEDURE — G0378 HOSPITAL OBSERVATION PER HR: HCPCS

## 2019-05-17 PROCEDURE — 80048 BASIC METABOLIC PNL TOTAL CA: CPT | Performed by: UROLOGY

## 2019-05-17 PROCEDURE — A9270 NON-COVERED ITEM OR SERVICE: HCPCS | Performed by: UROLOGY

## 2019-05-17 PROCEDURE — 63710000001 DEXAMETHASONE 2 MG TABLET: Performed by: UROLOGY

## 2019-05-17 PROCEDURE — 25010000002 HYDROMORPHONE PER 4 MG: Performed by: UROLOGY

## 2019-05-17 PROCEDURE — 63710000001 LEVETIRACETAM 500 MG TABLET: Performed by: UROLOGY

## 2019-05-17 PROCEDURE — 63710000001 HYDROCODONE-ACETAMINOPHEN 5-325 MG TABLET: Performed by: UROLOGY

## 2019-05-17 PROCEDURE — 85027 COMPLETE CBC AUTOMATED: CPT | Performed by: UROLOGY

## 2019-05-17 PROCEDURE — 63710000001 FAMOTIDINE 20 MG TABLET: Performed by: UROLOGY

## 2019-05-17 RX ORDER — HYDROCODONE BITARTRATE AND ACETAMINOPHEN 5; 325 MG/1; MG/1
1 TABLET ORAL EVERY 6 HOURS PRN
Qty: 4 TABLET | Refills: 0 | Status: SHIPPED | OUTPATIENT
Start: 2019-05-17 | End: 2019-08-23

## 2019-05-17 RX ORDER — SULFAMETHOXAZOLE AND TRIMETHOPRIM 800; 160 MG/1; MG/1
1 TABLET ORAL 2 TIMES DAILY
Qty: 14 TABLET | Refills: 0 | Status: SHIPPED | OUTPATIENT
Start: 2019-05-17 | End: 2019-08-23

## 2019-05-17 RX ADMIN — HYDROMORPHONE HYDROCHLORIDE 0.5 MG: 1 INJECTION, SOLUTION INTRAMUSCULAR; INTRAVENOUS; SUBCUTANEOUS at 05:39

## 2019-05-17 RX ADMIN — LEVETIRACETAM 500 MG: 500 TABLET, FILM COATED ORAL at 09:11

## 2019-05-17 RX ADMIN — HYDROCODONE BITARTRATE AND ACETAMINOPHEN 1 TABLET: 5; 325 TABLET ORAL at 15:59

## 2019-05-17 RX ADMIN — FAMOTIDINE 20 MG: 20 TABLET, FILM COATED ORAL at 09:11

## 2019-05-17 RX ADMIN — DEXAMETHASONE 2 MG: 2 TABLET ORAL at 09:12

## 2019-05-17 RX ADMIN — HYDROCODONE BITARTRATE AND ACETAMINOPHEN 1 TABLET: 5; 325 TABLET ORAL at 11:46

## 2019-05-17 NOTE — PLAN OF CARE
Problem: Patient Care Overview  Goal: Plan of Care Review  Outcome: Ongoing (interventions implemented as appropriate)   05/17/19 0459   Coping/Psychosocial   Plan of Care Reviewed With patient   Plan of Care Review   Progress improving   OTHER   Outcome Summary Calm and cooperative overnight. Rested well in bed. CBI continues with clear to light pink drainage, no evidence of clots. Medicated for pain x 1. Stood beside bed last night. Continu to [facility].        Problem: Fall Risk (Adult)  Goal: Identify Related Risk Factors and Signs and Symptoms  Outcome: Ongoing (interventions implemented as appropriate)    Goal: Absence of Fall  Outcome: Ongoing (interventions implemented as appropriate)

## 2019-05-17 NOTE — PERIOPERATIVE NURSING NOTE
SPOKE BRIEFLY WITH DR SINGLETON-UROLOGY RE: POST OP ORDERS FOR CBI, CLARIFICATION RECEIVED FOR NORMAL SALINE TO BE USED FOR IRRIGATION ON FLOOR

## 2019-05-17 NOTE — PERIOPERATIVE NURSING NOTE
MUCH CALMER AND MORE RELAXED; NO FURTHER ATTEMPTS TO PULL AT TUBES OR LINES OR CLIMB OOB. SOFT LIMB HOLDERS REMOVED AT THIS TIME.    WILL MONITOR

## 2019-05-17 NOTE — BRIEF OP NOTE
CYSTOSCOPY TRANSURETHRAL RESECTION OF BLADDER TUMOR  Progress Note    Silas Haines  5/16/2019    Pre-op Diagnosis:   Bladder tumor       Post-Op Diagnosis Codes:   Same    Procedure/CPT® Codes:      Procedure(s):  CYSTOSCOPY TRANSURETHRAL RESECTION OF BLADDER TUMOR    Surgeon(s):  Justo Figueroa Jr., MD    Anesthesia: Spinal    Staff:   Circulator: Conchis Salvador RN  Scrub Person: Aster Johnson    Estimated Blood Loss: minimal    Urine Voided: * No values recorded between 5/16/2019  6:57 PM and 5/16/2019  8:25 PM *    Specimens:                Specimens     ID Source Type Tests Collected By Collected At Frozen?      A Urinary Bladder Tissue · TISSUE PATHOLOGY EXAM   Justo Figueroa Jr., MD 5/16/19 1932 No     Description: BLADDER TUMOR                Drains:   Urethral Catheter Non-latex 24 Fr. (Active)       Findings: Large posterior wall bladder tumor    Complications: none      Justo Figueroa Jr., MD     Date: 5/16/2019  Time: 8:36 PM

## 2019-05-17 NOTE — PROGRESS NOTES
Discharge Planning Assessment  [facility]     Patient Name: Silas Haines  MRN: [id number]  Today's Date: 5/17/2019    Admit Date: 5/16/2019    Discharge Needs Assessment     Row Name 05/17/19 1052       Living Environment    Lives With  spouse    Name(s) of Who Lives With Patient  wife Carli Haines [phone]     Current Living Arrangements  home/apartment/condo    Primary Care Provided by  self;spouse/significant other    Provides Primary Care For  no one, unable/limited ability to care for self    Family Caregiver if Needed  spouse    Family Caregiver Names  wife Carli Haines [phone]     Quality of Family Relationships  helpful;involved;supportive    Able to Return to Prior Arrangements  yes       Resource/Environmental Concerns    Resource/Environmental Concerns  home accessibility    Home Accessibility Concerns  stairs to enter home 2 steps with 2 handrails to enter their 2 story home that includes a basement with 12 steps with 1 handrail.      Transportation Concerns  car, none       Transition Planning    Patient/Family Anticipates Transition to  home with family    Patient/Family Anticipated Services at Transition  none    Transportation Anticipated  family or friend will provide       Discharge Needs Assessment    Concerns to be Addressed  discharge planning    Equipment Currently Used at Home  wheelchair;walker, rolling;cane, straight;grab bar    Anticipated Changes Related to Illness  inability to care for self    Equipment Needed After Discharge  none    Outpatient/Agency/Support Group Needs  homecare agency    Discharge Facility/Level of Care Needs  home with home health    Offered/Gave Vendor List  yes    Patient's Choice of Community Agency(s)  Agreeable to [facility] if needed.     Current Discharge Risk  physical impairment        Discharge Plan     Row Name 05/17/19 1054       Plan    Plan  Plans home with assistance from his wife.  Follow to see if a  P.T. Eval is needed.  Patient agreeable to [facility] if needed.      Plan Comments  Met with the patient, his wife Carli Haines [phone] and daughter Vielka Mcdonough [phone] at bedside; explained role of CCP, verified facesheet, discussed Moon notice (copy provided to patient, copy placed in patient's chart and copy placed in HIM basket) and discussed discharge planning needs.  The patient plans to return home with assistance from his wife, he has a cane, walker, transport chair, 3 in 1 commode and grab bars in the shower.  The patient has 2 steps with 2 handrails to enter their 2 story home that includes a basement with 12 steps with 1 handrail.  The patient's PCP is Jeferson Parker, pharmacy is [facility] [address], they are not currently interested in using [facility] and the patient denies any trouble remembering to take his medication or with affording his medication.  They are unsure who the patient has used for HH in the past and state that the patient has been to [facility] for rehab in the past.  They were provided with a HH/SNF list and stated soni they would be agreeable to [facility] if needed.  The patient's wife is listed as his Healthcare Surrogate on the living will in [facility]. The patient's wife or daughter will transport him home upon d/c as his wife typically transports him to his appointments.  The patient and wife currently deny any d/c needs and CCP will follow to see if a P.T. eval is needed and will make a referral to [facility] if needed.  Maddie Baires, [facility]        Destination      No service coordination in this encounter.      Durable Medical Equipment      No service coordination in this encounter.      Dialysis/Infusion      No service coordination in this encounter.      Home Medical Care      No service coordination in this encounter.      Therapy      No service coordination in this encounter.      Community Resources      No service coordination in this  encounter.          Demographic Summary     Row Name 05/17/19 1051       General Information    Admission Type  observation    Arrived From  home    Referral Source  admission list    Reason for Consult  discharge planning    Preferred Language  English     Used During This Interaction  no        Functional Status     Row Name 05/17/19 1052       Functional Status    Usual Activity Tolerance  moderate    Current Activity Tolerance  fair       Functional Status, IADL    Medications  assistive equipment    Meal Preparation  assistive equipment    Housekeeping  assistive equipment    Laundry  assistive equipment    Shopping  assistive equipment       Mental Status    General Appearance WDL  WDL       Mental Status Summary    Recent Changes in Mental Status/Cognitive Functioning  no changes        Psychosocial    No documentation.       Abuse/Neglect    No documentation.       Legal    No documentation.       Substance Abuse    No documentation.       Patient Forms     Row Name 05/17/19 1051       Patient Forms    Provider Choice List  Delivered    Delivered to  Support person wife Carli Zaki [phone]     Method of delivery  In person            LY Chase

## 2019-05-17 NOTE — PERIOPERATIVE NURSING NOTE
PATIENT WIFE AND DAUGHTER TO BEDSIDE. UPDATED BOTH AT LENGTH ON PATIENT STATUS, CONFUSION AND ATTEMPTS TO PULL CATHETER OUT NECESSITATING NEED FOR LIMB HOLDERS FOR PATIENT SAFETY AT THIS TIME.     WILL MONITOR AND RE-EVALUATE AS NEEDED

## 2019-05-17 NOTE — PERIOPERATIVE NURSING NOTE
PATIENT CONFUSED, RESTLESS AND ANXIOUS. ATTEMPTING TO CLIMB OUT OF BED. ATTEMPTED TO CALM, REASSURE AND RE-ORIENT PATIENT.    PATIENT HAS MADE SEVERAL ATTEMPTS TO PULL AT URINARY CATHETER, ATTEMPTED TO PULL I.V. LINE OUT DESPITE ATTEMPTS AT RE-DIRECTION AND RE-ORIENTING.    BILATERAL SOFT LIMB HOLDERS APPLIED FOR PATIENT SAFETY

## 2019-05-17 NOTE — PROGRESS NOTES
POD1 s/p TURBT    Currently reports SP pain consistent with bladder spasm. Per nurse, he slept well through the night. Urine remained clear.    AVSS  Abd soft  Velazco intact, urine clear on CBI    Labs pending    Plan:  - voiding trial  - ambulate  - advance diet  - likely discharge this afternoon

## 2019-05-17 NOTE — ANESTHESIA POSTPROCEDURE EVALUATION
"Patient: Silas Haines    Procedure Summary     Date:  05/16/19 Room / Location:  [facility] OR 01 / [facility] MAIN OR    Anesthesia Start:  1857 Anesthesia Stop:  2025    Procedure:  CYSTOSCOPY TRANSURETHRAL RESECTION OF BLADDER TUMOR (N/A ) Diagnosis:      Surgeon:  Justo Figueroa Jr., MD Provider:  Felix Lange MD    Anesthesia Type:  general ASA Status:  4          Anesthesia Type: general  Last vitals  BP   (!) 183/105 (05/16/19 2143)   Temp   36.9 °C (98.4 °F) (05/16/19 2022)   Pulse   74 (05/16/19 2143)   Resp   16 (05/16/19 2130)     SpO2   100 % (05/16/19 2130)     Post Anesthesia Care and Evaluation    Patient location during evaluation: bedside  Patient participation: complete - patient participated  Level of consciousness: awake and alert  Pain management: adequate  Airway patency: patent  Anesthetic complications: No anesthetic complications    Cardiovascular status: acceptable  Respiratory status: acceptable  Hydration status: acceptable    Comments: BP (!) 183/105   Pulse 74   Temp 36.9 °C (98.4 °F) (Oral)   Resp 16   Ht 182.9 cm (72\")   Wt 66.7 kg (147 lb 1 oz)   SpO2 100%   BMI 19.95 kg/m²       "

## 2019-05-20 LAB
CYTO UR: NORMAL
LAB AP CASE REPORT: NORMAL
LAB AP DIAGNOSIS COMMENT: NORMAL
LAB AP SYNOPTIC CHECKLIST: NORMAL
PATH REPORT.FINAL DX SPEC: NORMAL
PATH REPORT.GROSS SPEC: NORMAL

## 2019-05-23 NOTE — OP NOTE
Operative Report     [facility] MAIN OR    Patient: Silas Haines  Age:      84 y.o.  :     1935  Sex:      male    Medical Record:  [id number]    Date of Operation/Procedure:  2019    Pre-op Diagnosis:   Bladder cancer    Post-Op Diagnosis Codes:   Same    Pre-operative Diagnosis Free Text:  * No pre-op diagnosis entered *     Name of Operation/Procedure:  Procedure(s) and Anesthesia Type:     * CYSTOSCOPY TRANSURETHRAL RESECTION OF BLADDER TUMOR - Spinal    Findings/Complications:  Large posterior wall bladder tumor    Description of procedure: After informed consent was obtained, the patient was taken to the OR and general anesthesia was induced. He was placed in lithotomy position, prepped and draped in the standard fashion. All pressure points were padded. The sheath with obturator was passed into the bladder. The bladder was then inspected in its entirety. Along the posterior wall was a large, spherical bladder tumor. The tumor was resected using the Esparza resectoscope with the cutting loop. Hemostasis was meticulously achieved. A 24F 3-way catheter was passed without difficulty with clear effluent. The patient was then awakened from anesthesia in the OR and taken to the recovery room    Estimated Blood Loss: minimal    Specimens:   Order Name Source Comment Collection Info Order Time   TYPE AND SCREEN   Collected By: Nicci Chen RN 2019  2:33 PM   TISSUE PATHOLOGY EXAM Urinary Bladder  Collected By: Justo Figueroa Jr., MD 2019  8:00 PM       Fluids/Drains: montano catheter    Justo Figueroa Jr., MD  2019  6:47 AM

## 2019-05-30 NOTE — DISCHARGE SUMMARY
Date of admission:  5/16/2019   Date of discharge:  5/17/2019   Admitting diagnosis:  Bladder mass  Discharge diagnosis:  Same    Procedures:  TURBT    Hospital course:  This is an 84 year old man recently found to have a bladder tumor. He underwent TURBT on the day of admission and tolerated it well. He was observed overnight due to his history of GBM and advanced age to ensure he recovered well from anesthesia. On POD1, he ambulated and tolerated a regular diet. He was comfortable and deemed stable for discharge.    Discharge medications:       Discharge Medications      New Medications      Instructions Start Date   HYDROcodone-acetaminophen 5-325 MG per tablet  Commonly known as:  NORCO   1 tablet, Oral, Every 6 Hours PRN      sulfamethoxazole-trimethoprim 800-160 MG per tablet  Commonly known as:  BACTRIM DS   1 tablet, Oral, 2 Times Daily         Continue These Medications      Instructions Start Date   acetaminophen 500 MG tablet  Commonly known as:  TYLENOL   500 mg, Oral, Every 6 Hours PRN      atorvastatin 20 MG tablet  Commonly known as:  LIPITOR   20 mg, Oral, Nightly      dexamethasone 2 MG tablet  Commonly known as:  DECADRON   2 mg, Oral, Daily With Breakfast      dexamethasone 1 MG tablet  Commonly known as:  DECADRON   1 mg, Oral, Daily With Breakfast      docusate sodium 100 MG capsule  Commonly known as:  COLACE   100 mg, Oral, Daily PRN      famotidine 20 MG tablet  Commonly known as:  PEPCID   20 mg, Oral, 2 Times Daily      isosorbide mononitrate 30 MG 24 hr tablet  Commonly known as:  IMDUR   30 mg, Oral, Every Evening      lenalidomide 10 MG capsule  Commonly known as:  REVLIMID   10 mg, Oral, TAKES FOR 14 DAYS AND THEN OFF X 14 DAYS -on hold for now      levETIRAcetam 500 MG tablet  Commonly known as:  KEPPRA   500 mg, Oral, 2 Times Daily      levETIRAcetam 750 MG tablet  Commonly known as:  KEPPRA   750 mg, Oral, Nightly      MULTI VITAMIN MENS tablet   1 tablet, Oral, Daily      senna 8.6  MG tablet tablet  Commonly known as:  SENOKOT   1 tablet, Oral, Daily PRN      Vitamin D (Ergocalciferol) 2000 units capsule   1 capsule, Oral, Daily              ROV:  1 week

## 2019-08-23 ENCOUNTER — OFFICE VISIT (OUTPATIENT)
Dept: INTERNAL MEDICINE | Facility: CLINIC | Age: 84
End: 2019-08-23

## 2019-08-23 VITALS
RESPIRATION RATE: 16 BRPM | SYSTOLIC BLOOD PRESSURE: 117 MMHG | WEIGHT: 159.6 LBS | BODY MASS INDEX: 21.62 KG/M2 | DIASTOLIC BLOOD PRESSURE: 76 MMHG | OXYGEN SATURATION: 98 % | TEMPERATURE: 97.6 F | HEART RATE: 76 BPM | HEIGHT: 72 IN

## 2019-08-23 DIAGNOSIS — K92.1 HEMATOCHEZIA: ICD-10-CM
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
## 2019-08-23 DIAGNOSIS — Z09 HOSPITAL DISCHARGE FOLLOW-UP: Primary | ICD-10-CM

## 2019-08-23 LAB
ALBUMIN SERPL-MCNC: 3.4 G/DL (ref 3.5–5.2)
ALBUMIN/GLOB SERPL: 0.8 G/DL
ALP SERPL-CCNC: 102 U/L (ref 39–117)
ALT SERPL-CCNC: 22 U/L (ref 1–41)
AST SERPL-CCNC: 20 U/L (ref 1–40)
BASOPHILS # BLD AUTO: (no result) 10*3/UL
BILIRUB SERPL-MCNC: 0.3 MG/DL (ref 0.2–1.2)
BUN SERPL-MCNC: 26 MG/DL (ref 8–23)
BUN/CREAT SERPL: 18.8 (ref 7–25)
CALCIUM SERPL-MCNC: 8.5 MG/DL (ref 8.6–10.5)
CHLORIDE SERPL-SCNC: 103 MMOL/L (ref 98–107)
CO2 SERPL-SCNC: 27.4 MMOL/L (ref 22–29)
CREAT SERPL-MCNC: 1.38 MG/DL (ref 0.76–1.27)
DIFFERENTIAL COMMENT: NORMAL
EOSINOPHIL # BLD AUTO: (no result) 10*3/UL
EOSINOPHIL NFR BLD AUTO: (no result) %
ERYTHROCYTE [DISTWIDTH] IN BLOOD BY AUTOMATED COUNT: 21.2 % (ref 12.3–15.4)
GLOBULIN SER CALC-MCNC: 4.1 GM/DL
GLUCOSE SERPL-MCNC: 80 MG/DL (ref 65–99)
HCT VFR BLD AUTO: 26.7 % (ref 37.5–51)
HGB BLD-MCNC: 8 G/DL (ref 13–17.7)
LYMPHOCYTES # BLD AUTO: (no result) 10*3/UL
LYMPHOCYTES # BLD MANUAL: 1.17 10*3/MM3 (ref 0.7–3.1)
LYMPHOCYTES NFR BLD AUTO: (no result) %
LYMPHOCYTES NFR BLD MANUAL: 32.3 % (ref 19.6–45.3)
MCH RBC QN AUTO: 32.8 PG (ref 26.6–33)
MCHC RBC AUTO-ENTMCNC: 30 G/DL (ref 31.5–35.7)
MCV RBC AUTO: 109.4 FL (ref 79–97)
MONOCYTES # BLD MANUAL: 0.18 10*3/MM3 (ref 0.1–0.9)
MONOCYTES NFR BLD AUTO: (no result) %
MONOCYTES NFR BLD MANUAL: 5.1 % (ref 5–12)
NEUTROPHILS # BLD MANUAL: 2.27 10*3/MM3 (ref 1.7–7)
NEUTROPHILS NFR BLD AUTO: (no result) %
NEUTROPHILS NFR BLD MANUAL: 62.6 % (ref 42.7–76)
PLATELET # BLD AUTO: 99 10*3/MM3 (ref 140–450)
PLATELET BLD QL SMEAR: NORMAL
POTASSIUM SERPL-SCNC: 4.4 MMOL/L (ref 3.5–5.2)
PROT SERPL-MCNC: 7.5 G/DL (ref 6–8.5)
RBC # BLD AUTO: 2.44 10*6/MM3 (ref 4.14–5.8)
RBC MORPH BLD: NORMAL
SODIUM SERPL-SCNC: 140 MMOL/L (ref 136–145)
WBC # BLD AUTO: 3.62 10*3/MM3 (ref 3.4–10.8)

## 2019-08-23 PROCEDURE — 99213 OFFICE O/P EST LOW 20 MIN: CPT | Performed by: NURSE PRACTITIONER

## 2019-08-23 RX ORDER — FLUTICASONE PROPIONATE 50 MCG
2 SPRAY, SUSPENSION (ML) NASAL DAILY
Qty: 9.9 ML | Refills: 2 | Status: SHIPPED | OUTPATIENT
Start: 2019-08-23

## 2019-08-23 RX ORDER — LISINOPRIL 5 MG/1
5 TABLET ORAL DAILY
COMMUNITY
Start: 2019-08-13 | End: 2019-11-21 | Stop reason: ALTCHOICE

## 2019-08-23 NOTE — PROGRESS NOTES
"Subjective   Silas Haines is a 84 y.o. male.   CC: Hospital follow-up, hematochezia, fall    Patient presents for hospital follow-up.  This is an 84-year-old male with a history of coronary artery disease, glioblastoma, prostate cancer following with Dr. Rai with [facility].  He was hospitalized from 8/1/2019 3/3/2019 with hematochezia.  He was hospitalized at [facility].  He has chronic pancytopenia as well related to his cancer chemotherapy treatments.  Per the discharge summary throughout the hospital stay serial H&H's remained stable.  He underwent EGD and C scope.  This showed some diverticulosis but no active bleeding.  It was felt that his blood was likely related to a mild diverticular bleed.  He was discharged on Pepcid and presents today for follow-up.  His wife accompanies him and helps provide HPI.  On 8/3/2019 when they got home from the hospital, patient's wife states that he was walking into the house, still \"a bit groggy from the medicine they gave him before his scope\" and he stumbled into the garage door and slid down to the bed.  Patient reports that he did not hit his head, but scraped his knee up a bit.  He is not having any lingering pain from the fall.  He denies fever, chills, shortness of breath, chest discomfort.  He has had no further rectal bleeding, and denies abdominal pain, nausea, vomiting, diarrhea or constipation.  He is having normal, formed bowel movements daily.  He denies development of any other new issues today.         The following portions of the patient's history were reviewed and updated as appropriate: allergies, current medications, past family history, past medical history, past social history, past surgical history and problem list.    Review of Systems   Constitutional: Negative for activity change, chills, fatigue, fever, unexpected weight gain and unexpected weight loss.   HENT: Negative for congestion, hearing loss, " "postnasal drip, sinus pressure, sneezing, sore throat, swollen glands and tinnitus.    Eyes: Negative for photophobia, pain and visual disturbance.   Respiratory: Negative for cough, chest tightness, shortness of breath and wheezing.    Cardiovascular: Negative for chest pain, palpitations and leg swelling.   Gastrointestinal: Negative for abdominal distention, abdominal pain, constipation, diarrhea, nausea and vomiting.   Endocrine: Negative for polydipsia, polyphagia and polyuria.   Genitourinary: Negative for dysuria, frequency, hematuria and urgency.   Neurological: Negative for dizziness, weakness, numbness and headache.   All other systems reviewed and are negative.      Objective    /76 (BP Location: Left arm, Patient Position: Sitting, Cuff Size: Adult)   Pulse 76   Temp 97.6 °F (36.4 °C) (Oral)   Resp 16   Ht 182.9 cm (72\")   Wt 72.4 kg (159 lb 9.6 oz)   SpO2 98%   BMI 21.65 kg/m²     Physical Exam   Constitutional: He is oriented to person, place, and time. He appears well-developed and well-nourished. No distress.   HENT:   Head: Normocephalic and atraumatic.   Eyes: Conjunctivae and EOM are normal. Pupils are equal, round, and reactive to light.   Neck: Normal range of motion. Neck supple. No JVD present. No tracheal deviation present. No thyromegaly present.   Cardiovascular: Normal rate, regular rhythm, normal heart sounds and intact distal pulses. Exam reveals no gallop and no friction rub.   No murmur heard.  No peripheral pitting edema.  Posterior tib pulses 2+ and equal bilaterally.   Pulmonary/Chest: Effort normal and breath sounds normal. No stridor. No respiratory distress. He has no wheezes. He has no rales. He exhibits no tenderness.   Lungs are CTA bilaterally   Abdominal: Soft. Bowel sounds are normal. He exhibits no distension and no mass. There is no tenderness. There is no rebound and no guarding. No hernia.   Bowel sounds active x4 quadrants.  No pain to light or deep " palpation x4 quadrants.   Musculoskeletal: Normal range of motion.   Lymphadenopathy:     He has no cervical adenopathy.   Neurological: He is alert and oriented to person, place, and time.   Skin: Skin is warm and dry. Capillary refill takes less than 2 seconds. He is not diaphoretic.   Psychiatric: He has a normal mood and affect. His behavior is normal.   Nursing note and vitals reviewed.    Current outpatient and discharge medications have been reconciled for the patient.  Reviewed by: FERMIN Turpin      Assessment/Plan   Silas was seen today for follow-up and fall.    Diagnoses and all orders for this visit:    Hospital discharge follow-up  -     CBC & Differential  -     Comprehensive metabolic panel    Hematochezia  -     CBC & Differential  -     Comprehensive metabolic panel    Fall, initial encounter    Other orders  -     fluticasone (FLONASE) 50 MCG/ACT nasal spray; 2 sprays into the nostril(s) as directed by provider Daily.      -Hospital follow-up: Reviewed documentation and discharge summary from 8/1/2019 3/3/2019 hospital stay at [facility].  He has had no further evidence of hematochezia.  Bowel movements are daily and formed with no evidence of blood or mucus.    -He is following closely with Dr. Klein with [facility].  He is set to begin radiation therapy on Monday per patient.  He is maintained on Avastin therapy per patient.    -Fall: He has had no lingering pain from his minor fall.  He did not hit his head.  No signs of complication at this time.    -We will get a repeat CBC and CMP today to ensure stability of H&H as well as electrolytes.  We will contact patient with results of his labs and any further recommendations.  I will see him back as needed and in 3 months for routine health maintenance and follow-up on chronic conditions.

## 2019-08-26 NOTE — PROGRESS NOTES
Please notify patient that his blood count is showing a decreased hemoglobin level at 8.0 as well as a decreased red blood cell level and a low platelet count.  I see that he sees his hematology/oncology doctor tomorrow, Dr. Hightower with [facility].  Please fax a copy of his labs over to Dr. Hightower.  He is very anemic and we need to figure out why.  Please ensure that he has not seen any further blood in his stool.

## 2019-09-09 RX ORDER — ATORVASTATIN CALCIUM 20 MG/1
TABLET, FILM COATED ORAL
Qty: 90 TABLET | Refills: 2 | Status: SHIPPED | OUTPATIENT
Start: 2019-09-09 | End: 2020-01-01

## 2019-09-26 ENCOUNTER — OFFICE VISIT (OUTPATIENT)
Dept: INTERNAL MEDICINE | Facility: CLINIC | Age: 84
End: 2019-09-26

## 2019-09-26 VITALS
HEIGHT: 72 IN | WEIGHT: 162.5 LBS | RESPIRATION RATE: 16 BRPM | TEMPERATURE: 97.7 F | HEART RATE: 79 BPM | OXYGEN SATURATION: 98 % | BODY MASS INDEX: 22.01 KG/M2 | SYSTOLIC BLOOD PRESSURE: 168 MMHG | DIASTOLIC BLOOD PRESSURE: 82 MMHG

## 2019-09-26 DIAGNOSIS — J01.00 ACUTE MAXILLARY SINUSITIS, RECURRENCE NOT SPECIFIED: Primary | ICD-10-CM

## 2019-09-26 PROCEDURE — 99213 OFFICE O/P EST LOW 20 MIN: CPT | Performed by: NURSE PRACTITIONER

## 2019-09-26 RX ORDER — AZITHROMYCIN 250 MG/1
TABLET, FILM COATED ORAL
Qty: 6 TABLET | Refills: 0 | Status: SHIPPED | OUTPATIENT
Start: 2019-09-26 | End: 2019-11-21

## 2019-09-26 RX ORDER — BENZONATATE 100 MG/1
100 CAPSULE ORAL 3 TIMES DAILY PRN
Qty: 42 CAPSULE | Refills: 0 | Status: SHIPPED | OUTPATIENT
Start: 2019-09-26 | End: 2020-01-01

## 2019-09-26 NOTE — PROGRESS NOTES
Subjective   Silas Haines is a 84 y.o. male.   Chief Complaint   Patient presents with   • URI       Patient presents for evaluation of cough, congestion, ear discomfort, postnasal drip and rhinorrhea.  This is an 84-year-old male.  He is currently undergoing chemotherapy and radiation for glioblastoma.  He endorses 1 week of ear congestion, left worse than right, cough productive of thick green sputum, postnasal drip, rhinorrhea and sinus pain and pressure.  He endorses chills but no out right fever.  He denies shortness of breath or chest discomfort.  He has been taking Zyrtec over-the-counter which has helped mildly.  He denies development of any other new issues today.         The following portions of the patient's history were reviewed and updated as appropriate: allergies, current medications, past family history, past medical history, past social history, past surgical history and problem list.    Review of Systems   Constitutional: Positive for chills. Negative for activity change, fatigue, fever, unexpected weight gain and unexpected weight loss.   HENT: Positive for congestion, ear pain (  L>R), postnasal drip, rhinorrhea, sinus pressure and sore throat. Negative for hearing loss, sneezing, swollen glands and tinnitus.    Eyes: Negative for photophobia, pain and visual disturbance.   Respiratory: Positive for cough. Negative for chest tightness, shortness of breath and wheezing.    Cardiovascular: Negative for chest pain, palpitations and leg swelling.   Gastrointestinal: Negative for abdominal distention, abdominal pain, constipation, diarrhea, nausea and vomiting.   Endocrine: Negative for polydipsia, polyphagia and polyuria.   Genitourinary: Negative for dysuria, frequency, hematuria and urgency.   Neurological: Negative for dizziness, weakness, numbness and headache.   All other systems reviewed and are negative.      Objective    /82 (BP Location: Left arm, Patient Position: Sitting,  "Cuff Size: Small Adult)   Pulse 79   Temp 97.7 °F (36.5 °C) (Oral)   Resp 16   Ht 182.9 cm (72\")   Wt 73.7 kg (162 lb 8 oz)   SpO2 98%   BMI 22.04 kg/m²     Physical Exam   Constitutional: He is oriented to person, place, and time. He appears well-developed and well-nourished. No distress.   HENT:   Head: Atraumatic.   Right Ear: External ear normal. Tympanic membrane is not erythematous, not retracted and not bulging. A middle ear effusion is present.   Left Ear: External ear normal. Tympanic membrane is not erythematous, not retracted and not bulging. A middle ear effusion is present.   Nose: Mucosal edema, rhinorrhea and congestion present. Right sinus exhibits maxillary sinus tenderness. Right sinus exhibits no frontal sinus tenderness. Left sinus exhibits maxillary sinus tenderness. Left sinus exhibits no frontal sinus tenderness.   Mouth/Throat: Uvula is midline and mucous membranes are normal. Posterior oropharyngeal erythema present. No oropharyngeal exudate, posterior oropharyngeal edema or tonsillar abscesses.   Eyes: Conjunctivae and EOM are normal. Pupils are equal, round, and reactive to light.   Neck: Normal range of motion. Neck supple.   Cardiovascular: Normal rate and regular rhythm.   Pulmonary/Chest: Effort normal and breath sounds normal. No stridor. No respiratory distress. He has no wheezes. He has no rales. He exhibits no tenderness.   Lungs are CTA bilaterally   Musculoskeletal: Normal range of motion.   Neurological: He is alert and oriented to person, place, and time.   Skin: Skin is warm and dry. Capillary refill takes less than 2 seconds. He is not diaphoretic.   Psychiatric: He has a normal mood and affect. His behavior is normal. Judgment and thought content normal.   Nursing note and vitals reviewed.    Current outpatient and discharge medications have been reconciled for the patient.  Reviewed by: FERMIN Turpin      Assessment/Plan   Silas was seen today for " uri.    Diagnoses and all orders for this visit:    Acute maxillary sinusitis, recurrence not specified  -     azithromycin (ZITHROMAX Z-MELISSA) 250 MG tablet; Take 2 tablets the first day, then 1 tablet daily for 4 days.  -     benzonatate (TESSALON PERLES) 100 MG capsule; Take 1 capsule by mouth 3 (Three) Times a Day As Needed for Cough.    -We will treat maxillary sinusitis with azithromycin.  Also recommended Flonase, antihistamine and Mucinex.  Will provide Tessalon Perles for the cough as well.  He is immunocompromised and on radiation and chemotherapy, so we will go ahead and treat with the antibiotic.    Follow-up if symptoms persist or worsen.  I will see him back at his next scheduled follow-up in November 2019.

## 2019-10-28 ENCOUNTER — FLU SHOT (OUTPATIENT)
Dept: INTERNAL MEDICINE | Facility: CLINIC | Age: 84
End: 2019-10-28

## 2019-10-28 DIAGNOSIS — Z23 FLU VACCINE NEED: Primary | ICD-10-CM

## 2019-10-28 PROCEDURE — 90653 IIV ADJUVANT VACCINE IM: CPT | Performed by: NURSE PRACTITIONER

## 2019-10-28 PROCEDURE — G0008 ADMIN INFLUENZA VIRUS VAC: HCPCS | Performed by: NURSE PRACTITIONER

## 2019-11-21 ENCOUNTER — OFFICE VISIT (OUTPATIENT)
Dept: INTERNAL MEDICINE | Facility: CLINIC | Age: 84
End: 2019-11-21

## 2019-11-21 VITALS
SYSTOLIC BLOOD PRESSURE: 158 MMHG | HEART RATE: 76 BPM | HEIGHT: 72 IN | TEMPERATURE: 97.7 F | RESPIRATION RATE: 16 BRPM | DIASTOLIC BLOOD PRESSURE: 86 MMHG | WEIGHT: 164 LBS | BODY MASS INDEX: 22.21 KG/M2 | OXYGEN SATURATION: 97 %

## 2019-11-21 DIAGNOSIS — C71.9 GLIOBLASTOMA MULTIFORME (HCC): ICD-10-CM

## 2019-11-21 DIAGNOSIS — I10 ESSENTIAL HYPERTENSION: Primary | ICD-10-CM

## 2019-11-21 DIAGNOSIS — E78.00 HYPERCHOLESTEROLEMIA: ICD-10-CM

## 2019-11-21 PROCEDURE — 99214 OFFICE O/P EST MOD 30 MIN: CPT | Performed by: NURSE PRACTITIONER

## 2019-11-21 RX ORDER — LISINOPRIL 10 MG/1
5 TABLET ORAL DAILY
COMMUNITY
Start: 2019-11-02 | End: 2020-01-01 | Stop reason: HOSPADM

## 2019-11-21 NOTE — PROGRESS NOTES
"Subjective   Silas Haines is a 84 y.o. male.   CC: 3-month follow-up, hyperlipidemia, hypertension, Healthcare maintenance    Patient presents for 3-month follow-up.  This is an 84-year-old male.    Currently he is being followed by Dr. Rai with [facility] for glioblastoma.  He reports that he is finished with radiation and is currently undergoing chemotherapy.  He has had some chemotherapy related pancytopenia.  He is getting routine blood draws with oncology.  He is set to have blood drawn again next week per patient.  Currently on Avastin therapy.  Patient and wife report that he is off of the dexamethasone.  He reports that overall he is feeling \"very well.\"    For hyperlipidemia he takes atorvastatin 20 mg daily and reports excellent compliance and toleration of this medication.    For hypertension he takes Imdur 30 mg daily lisinopril 5 mg daily.  Blood pressure is elevated today at 158/86.  Patient's wife reports that she checks it for him at home a few times per week and it is normally \"not this high.\"  He denies headache, visual changes, shortness of breath or chest discomfort.    He has had his flu shot for this year already.  He denies development of any other new issues today.         The following portions of the patient's history were reviewed and updated as appropriate: allergies, current medications, past family history, past medical history, past social history, past surgical history and problem list.    Review of Systems   Constitutional: Negative for activity change, chills, fatigue, fever, unexpected weight gain and unexpected weight loss.   HENT: Negative for congestion, hearing loss, postnasal drip, sinus pressure, sneezing, sore throat, swollen glands and tinnitus.    Eyes: Negative for photophobia, pain and visual disturbance.   Respiratory: Negative for cough, chest tightness, shortness of breath and wheezing.    Cardiovascular: Negative for chest pain, palpitations " "and leg swelling.   Gastrointestinal: Negative for abdominal distention, abdominal pain, constipation, diarrhea, nausea and vomiting.   Endocrine: Negative for polydipsia, polyphagia and polyuria.   Genitourinary: Negative for dysuria, frequency, hematuria and urgency.   Neurological: Negative for dizziness, weakness, numbness and headache.   All other systems reviewed and are negative.      Objective    /86 Comment: manual recheck  Pulse 76   Temp 97.7 °F (36.5 °C) (Oral)   Resp 16   Ht 182.9 cm (72\")   Wt 74.4 kg (164 lb)   SpO2 97%   BMI 22.24 kg/m²     Physical Exam   Constitutional: He is oriented to person, place, and time. He appears well-developed and well-nourished. No distress.   HENT:   Head: Normocephalic and atraumatic.   Right Ear: External ear normal.   Left Ear: External ear normal.   Nose: Nose normal.   Mouth/Throat: Oropharynx is clear and moist.   Eyes: EOM are normal. Pupils are equal, round, and reactive to light.   Neck: Normal range of motion. Neck supple. No JVD present. No tracheal deviation present. No thyromegaly present.   Cardiovascular: Normal rate, regular rhythm, normal heart sounds and intact distal pulses. Exam reveals no gallop and no friction rub.   No murmur heard.  No peripheral pitting edema.   Pulmonary/Chest: Effort normal and breath sounds normal. No stridor. No respiratory distress. He has no wheezes. He has no rales. He exhibits no tenderness.   Lungs are CTA bilaterally   Abdominal: Soft. Bowel sounds are normal. He exhibits no distension. There is no tenderness.   Musculoskeletal: Normal range of motion.   Lymphadenopathy:     He has no cervical adenopathy.   Neurological: He is alert and oriented to person, place, and time.   Skin: Skin is warm and dry. Capillary refill takes less than 2 seconds. He is not diaphoretic.   Psychiatric: He has a normal mood and affect. His behavior is normal. Judgment and thought content normal.   Nursing note and vitals " reviewed.    Current outpatient and discharge medications have been reconciled for the patient.  Reviewed by: FERMIN Turpin      Assessment/Plan   Silas was seen today for follow-up.    Diagnoses and all orders for this visit:    Essential hypertension    Hypercholesterolemia    Glioblastoma multiforme (CMS/HCC)/ left side      -Hypertension: Elevated today at 158/86.  I have asked his wife to monitor his blood pressure a few times per week at home and keep a log of readings.  If blood pressures are consistently above 140/80 we will make adjustments.    -Hyperlipidemia: Continue atorvastatin 20 mg daily.  We will check a lipid panel at his next 3-month follow-up.    -Glioblastoma multiforme: Following with Dr. Rai with [facility].  Currently undergoing chemotherapy.  He reports that he is finished with radiation.  Routine CBCs with oncology as he has had issues with pancytopenia.    -We did discuss nutrition.  I discussed eating iron rich foods and getting a good source of protein every day.  They are going to try boost or Ensure as well.    -We will see patient back in 3 months for a Medicare wellness visit/follow-up on chronic conditions/routine health maintenance.  Follow-up PRN in the meantime.

## 2020-01-01 ENCOUNTER — CLINICAL SUPPORT (OUTPATIENT)
Dept: INTERNAL MEDICINE | Facility: CLINIC | Age: 85
End: 2020-01-01

## 2020-01-01 ENCOUNTER — RESULTS ENCOUNTER (OUTPATIENT)
Dept: INTERNAL MEDICINE | Facility: CLINIC | Age: 85
End: 2020-01-01

## 2020-01-01 ENCOUNTER — ANESTHESIA EVENT (OUTPATIENT)
Dept: PERIOP | Facility: HOSPITAL | Age: 85
End: 2020-01-01

## 2020-01-01 ENCOUNTER — TELEPHONE (OUTPATIENT)
Dept: INTERNAL MEDICINE | Facility: CLINIC | Age: 85
End: 2020-01-01

## 2020-01-01 ENCOUNTER — APPOINTMENT (OUTPATIENT)
Dept: OTHER | Facility: HOSPITAL | Age: 85
End: 2020-01-01

## 2020-01-01 ENCOUNTER — HOSPITAL ENCOUNTER (INPATIENT)
Facility: HOSPITAL | Age: 85
LOS: 14 days | Discharge: HOME HEALTH | End: 2020-08-29
Attending: PHYSICAL MEDICINE & REHABILITATION | Admitting: PHYSICAL MEDICINE & REHABILITATION

## 2020-01-01 ENCOUNTER — APPOINTMENT (OUTPATIENT)
Dept: GENERAL RADIOLOGY | Facility: HOSPITAL | Age: 85
End: 2020-01-01

## 2020-01-01 ENCOUNTER — OFFICE VISIT (OUTPATIENT)
Dept: INTERNAL MEDICINE | Facility: CLINIC | Age: 85
End: 2020-01-01

## 2020-01-01 ENCOUNTER — APPOINTMENT (OUTPATIENT)
Dept: PREADMISSION TESTING | Facility: HOSPITAL | Age: 85
End: 2020-01-01

## 2020-01-01 ENCOUNTER — HOSPITAL ENCOUNTER (OUTPATIENT)
Dept: GENERAL RADIOLOGY | Facility: HOSPITAL | Age: 85
Discharge: HOME | End: 2020-07-02
Admitting: NURSE PRACTITIONER

## 2020-01-01 ENCOUNTER — HOSPITAL ENCOUNTER (OUTPATIENT)
Facility: HOSPITAL | Age: 85
Setting detail: HOSPITAL OUTPATIENT SURGERY
Discharge: HOME | End: 2020-06-26
Attending: UROLOGY | Admitting: UROLOGY

## 2020-01-01 ENCOUNTER — ANESTHESIA (OUTPATIENT)
Dept: PERIOP | Facility: HOSPITAL | Age: 85
End: 2020-01-01

## 2020-01-01 VITALS
HEIGHT: 72 IN | SYSTOLIC BLOOD PRESSURE: 132 MMHG | TEMPERATURE: 97.9 F | WEIGHT: 150 LBS | BODY MASS INDEX: 20.32 KG/M2 | HEART RATE: 68 BPM | RESPIRATION RATE: 16 BRPM | OXYGEN SATURATION: 98 % | DIASTOLIC BLOOD PRESSURE: 81 MMHG

## 2020-01-01 VITALS
HEART RATE: 51 BPM | OXYGEN SATURATION: 96 % | HEIGHT: 72 IN | RESPIRATION RATE: 16 BRPM | WEIGHT: 151 LBS | DIASTOLIC BLOOD PRESSURE: 61 MMHG | BODY MASS INDEX: 20.45 KG/M2 | SYSTOLIC BLOOD PRESSURE: 134 MMHG | TEMPERATURE: 97.7 F

## 2020-01-01 VITALS
WEIGHT: 143.3 LBS | HEART RATE: 62 BPM | SYSTOLIC BLOOD PRESSURE: 174 MMHG | RESPIRATION RATE: 18 BRPM | TEMPERATURE: 98.5 F | DIASTOLIC BLOOD PRESSURE: 87 MMHG | BODY MASS INDEX: 19.41 KG/M2 | HEIGHT: 72 IN | OXYGEN SATURATION: 98 %

## 2020-01-01 VITALS
HEIGHT: 72 IN | BODY MASS INDEX: 19.37 KG/M2 | RESPIRATION RATE: 16 BRPM | OXYGEN SATURATION: 97 % | TEMPERATURE: 98.5 F | DIASTOLIC BLOOD PRESSURE: 68 MMHG | WEIGHT: 143 LBS | HEART RATE: 70 BPM | SYSTOLIC BLOOD PRESSURE: 123 MMHG

## 2020-01-01 VITALS
OXYGEN SATURATION: 98 % | RESPIRATION RATE: 16 BRPM | DIASTOLIC BLOOD PRESSURE: 74 MMHG | TEMPERATURE: 97.8 F | HEART RATE: 64 BPM | HEIGHT: 72 IN | SYSTOLIC BLOOD PRESSURE: 158 MMHG | WEIGHT: 152.4 LBS | BODY MASS INDEX: 20.64 KG/M2

## 2020-01-01 VITALS
RESPIRATION RATE: 18 BRPM | HEIGHT: 72 IN | HEART RATE: 73 BPM | WEIGHT: 149.2 LBS | TEMPERATURE: 97.6 F | DIASTOLIC BLOOD PRESSURE: 93 MMHG | BODY MASS INDEX: 20.21 KG/M2 | OXYGEN SATURATION: 99 % | SYSTOLIC BLOOD PRESSURE: 182 MMHG

## 2020-01-01 DIAGNOSIS — R79.89 ELEVATED SERUM CREATININE: Primary | ICD-10-CM

## 2020-01-01 DIAGNOSIS — Z09 HOSPITAL DISCHARGE FOLLOW-UP: Primary | ICD-10-CM

## 2020-01-01 DIAGNOSIS — Z00.00 HEALTHCARE MAINTENANCE: ICD-10-CM

## 2020-01-01 DIAGNOSIS — H53.9 VISUAL CHANGES: ICD-10-CM

## 2020-01-01 DIAGNOSIS — E78.00 HYPERCHOLESTEROLEMIA: ICD-10-CM

## 2020-01-01 DIAGNOSIS — Z85.828 HISTORY OF SKIN CANCER: ICD-10-CM

## 2020-01-01 DIAGNOSIS — R09.89 RESPIRATORY CRACKLES AT RIGHT LUNG BASE: ICD-10-CM

## 2020-01-01 DIAGNOSIS — Z00.00 MEDICARE ANNUAL WELLNESS VISIT, SUBSEQUENT: Primary | ICD-10-CM

## 2020-01-01 DIAGNOSIS — F51.01 PRIMARY INSOMNIA: ICD-10-CM

## 2020-01-01 DIAGNOSIS — Z09 FOLLOW UP: ICD-10-CM

## 2020-01-01 DIAGNOSIS — I10 ESSENTIAL HYPERTENSION: ICD-10-CM

## 2020-01-01 DIAGNOSIS — R56.9 SEIZURE (HCC): Primary | ICD-10-CM

## 2020-01-01 DIAGNOSIS — C61 MALIGNANT NEOPLASM OF PROSTATE (HCC): ICD-10-CM

## 2020-01-01 DIAGNOSIS — L98.9 SKIN LESION: ICD-10-CM

## 2020-01-01 DIAGNOSIS — C71.9 GLIOBLASTOMA MULTIFORME (HCC): Primary | ICD-10-CM

## 2020-01-01 DIAGNOSIS — R79.89 ELEVATED SERUM CREATININE: ICD-10-CM

## 2020-01-01 DIAGNOSIS — Z23 NEED FOR IMMUNIZATION AGAINST INFLUENZA: Primary | ICD-10-CM

## 2020-01-01 DIAGNOSIS — N13.1 HYDRONEPHROSIS DUE TO OBSTRUCTION OF URETERAL ORIFICE: Primary | ICD-10-CM

## 2020-01-01 DIAGNOSIS — C71.9 GLIOBLASTOMA MULTIFORME (HCC): ICD-10-CM

## 2020-01-01 DIAGNOSIS — B02.29 POSTHERPETIC NEURALGIA: ICD-10-CM

## 2020-01-01 LAB
ALBUMIN SERPL-MCNC: 3 G/DL (ref 3.5–5.2)
ALBUMIN SERPL-MCNC: 3 G/DL (ref 3.5–5.2)
ALBUMIN SERPL-MCNC: 3.3 G/DL (ref 3.5–5.2)
ALBUMIN SERPL-MCNC: 3.3 G/DL (ref 3.5–5.2)
ALBUMIN SERPL-MCNC: 3.5 G/DL (ref 3.5–5.2)
ALBUMIN/GLOB SERPL: 0.6 G/DL
ALBUMIN/GLOB SERPL: 0.6 G/DL
ALBUMIN/GLOB SERPL: 0.8 G/DL
ALBUMIN/GLOB SERPL: 0.8 G/DL
ALBUMIN/GLOB SERPL: 0.9 G/DL
ALP SERPL-CCNC: 51 U/L (ref 39–117)
ALP SERPL-CCNC: 52 U/L (ref 39–117)
ALP SERPL-CCNC: 55 U/L (ref 39–117)
ALP SERPL-CCNC: 58 U/L (ref 39–117)
ALP SERPL-CCNC: 82 U/L (ref 39–117)
ALT SERPL W P-5'-P-CCNC: 24 U/L (ref 1–41)
ALT SERPL W P-5'-P-CCNC: 28 U/L (ref 1–41)
ALT SERPL W P-5'-P-CCNC: 34 U/L (ref 1–41)
ALT SERPL W P-5'-P-CCNC: 40 U/L (ref 1–41)
ALT SERPL-CCNC: 16 U/L (ref 1–41)
ANION GAP SERPL CALCULATED.3IONS-SCNC: 10 MMOL/L (ref 5–15)
ANION GAP SERPL CALCULATED.3IONS-SCNC: 10.7 MMOL/L (ref 5–15)
ANION GAP SERPL CALCULATED.3IONS-SCNC: 4.2 MMOL/L (ref 5–15)
ANION GAP SERPL CALCULATED.3IONS-SCNC: 4.9 MMOL/L (ref 5–15)
ANION GAP SERPL CALCULATED.3IONS-SCNC: 5.5 MMOL/L (ref 5–15)
ANION GAP SERPL CALCULATED.3IONS-SCNC: 5.7 MMOL/L (ref 5–15)
ANION GAP SERPL CALCULATED.3IONS-SCNC: 5.8 MMOL/L (ref 5–15)
ANION GAP SERPL CALCULATED.3IONS-SCNC: 6.3 MMOL/L (ref 5–15)
ANION GAP SERPL CALCULATED.3IONS-SCNC: 7.4 MMOL/L (ref 5–15)
ANION GAP SERPL CALCULATED.3IONS-SCNC: 9.2 MMOL/L (ref 5–15)
AST SERPL-CCNC: 13 U/L (ref 1–40)
AST SERPL-CCNC: 14 U/L (ref 1–40)
AST SERPL-CCNC: 16 U/L (ref 1–40)
AST SERPL-CCNC: 21 U/L (ref 1–40)
AST SERPL-CCNC: 21 U/L (ref 1–40)
BACTERIA SPEC AEROBE CULT: ABNORMAL
BACTERIA SPEC AEROBE CULT: NO GROWTH
BACTERIA UR QL AUTO: ABNORMAL /HPF
BACTERIA UR QL AUTO: ABNORMAL /HPF
BASOPHILS # BLD AUTO: 0 10*3/MM3 (ref 0–0.2)
BASOPHILS NFR BLD AUTO: 0 % (ref 0–1.5)
BILIRUB SERPL-MCNC: 0.3 MG/DL (ref 0–1.2)
BILIRUB SERPL-MCNC: 0.3 MG/DL (ref 0–1.2)
BILIRUB SERPL-MCNC: 0.4 MG/DL (ref 0–1.2)
BILIRUB UR QL STRIP: NEGATIVE
BILIRUB UR QL STRIP: NEGATIVE
BUN SERPL-MCNC: 25 MG/DL (ref 8–23)
BUN SERPL-MCNC: 27 MG/DL (ref 8–23)
BUN SERPL-MCNC: 29 MG/DL (ref 8–23)
BUN SERPL-MCNC: 31 MG/DL (ref 8–23)
BUN SERPL-MCNC: 36 MG/DL (ref 8–23)
BUN SERPL-MCNC: 45 MG/DL (ref 8–23)
BUN SERPL-MCNC: 54 MG/DL (ref 8–23)
BUN SERPL-MCNC: 55 MG/DL (ref 8–23)
BUN SERPL-MCNC: 55 MG/DL (ref 8–23)
BUN SERPL-MCNC: 56 MG/DL (ref 8–23)
BUN SERPL-MCNC: 63 MG/DL (ref 8–23)
BUN/CREAT SERPL: 25.5 (ref 7–25)
BUN/CREAT SERPL: 26.9 (ref 7–25)
BUN/CREAT SERPL: 29.6 (ref 7–25)
BUN/CREAT SERPL: 31 (ref 7–25)
BUN/CREAT SERPL: 31 (ref 7–25)
BUN/CREAT SERPL: 34.9 (ref 7–25)
BUN/CREAT SERPL: 37.2 (ref 7–25)
BUN/CREAT SERPL: 38.9 (ref 7–25)
BUN/CREAT SERPL: 41 (ref 7–25)
BUN/CREAT SERPL: 45.8 (ref 7–25)
BUN/CREAT SERPL: 48.1 (ref 7–25)
CALCIUM SERPL-MCNC: 8.4 MG/DL (ref 8.6–10.5)
CALCIUM SPEC-SCNC: 8.4 MG/DL (ref 8.6–10.5)
CALCIUM SPEC-SCNC: 8.4 MG/DL (ref 8.6–10.5)
CALCIUM SPEC-SCNC: 8.5 MG/DL (ref 8.6–10.5)
CALCIUM SPEC-SCNC: 8.5 MG/DL (ref 8.6–10.5)
CALCIUM SPEC-SCNC: 8.7 MG/DL (ref 8.6–10.5)
CALCIUM SPEC-SCNC: 8.8 MG/DL (ref 8.6–10.5)
CALCIUM SPEC-SCNC: 8.8 MG/DL (ref 8.6–10.5)
CALCIUM SPEC-SCNC: 8.9 MG/DL (ref 8.6–10.5)
CALCIUM SPEC-SCNC: 9 MG/DL (ref 8.6–10.5)
CALCIUM SPEC-SCNC: 9 MG/DL (ref 8.6–10.5)
CHLORIDE SERPL-SCNC: 100 MMOL/L (ref 98–107)
CHLORIDE SERPL-SCNC: 102 MMOL/L (ref 98–107)
CHLORIDE SERPL-SCNC: 104 MMOL/L (ref 98–107)
CHLORIDE SERPL-SCNC: 105 MMOL/L (ref 98–107)
CHLORIDE SERPL-SCNC: 106 MMOL/L (ref 98–107)
CHLORIDE SERPL-SCNC: 106 MMOL/L (ref 98–107)
CHLORIDE SERPL-SCNC: 108 MMOL/L (ref 98–107)
CHOLEST SERPL-MCNC: 137 MG/DL (ref 0–200)
CLARITY UR: ABNORMAL
CLARITY UR: ABNORMAL
CO2 SERPL-SCNC: 19.3 MMOL/L (ref 22–29)
CO2 SERPL-SCNC: 19.8 MMOL/L (ref 22–29)
CO2 SERPL-SCNC: 20 MMOL/L (ref 22–29)
CO2 SERPL-SCNC: 22.1 MMOL/L (ref 22–29)
CO2 SERPL-SCNC: 22.2 MMOL/L (ref 22–29)
CO2 SERPL-SCNC: 22.3 MMOL/L (ref 22–29)
CO2 SERPL-SCNC: 22.7 MMOL/L (ref 22–29)
CO2 SERPL-SCNC: 23.5 MMOL/L (ref 22–29)
CO2 SERPL-SCNC: 24.6 MMOL/L (ref 22–29)
CO2 SERPL-SCNC: 24.8 MMOL/L (ref 22–29)
CO2 SERPL-SCNC: 24.9 MMOL/L (ref 22–29)
COLOR UR: ABNORMAL
COLOR UR: YELLOW
CREAT SERPL-MCNC: 0.87 MG/DL (ref 0.76–1.27)
CREAT SERPL-MCNC: 0.98 MG/DL (ref 0.76–1.27)
CREAT SERPL-MCNC: 0.98 MG/DL (ref 0.76–1.27)
CREAT SERPL-MCNC: 1 MG/DL (ref 0.76–1.27)
CREAT SERPL-MCNC: 1.18 MG/DL (ref 0.76–1.27)
CREAT SERPL-MCNC: 1.29 MG/DL (ref 0.76–1.27)
CREAT SERPL-MCNC: 1.31 MG/DL (ref 0.76–1.27)
CREAT SERPL-MCNC: 1.34 MG/DL (ref 0.76–1.27)
CREAT SERPL-MCNC: 1.34 MG/DL (ref 0.76–1.27)
CREAT SERPL-MCNC: 1.44 MG/DL (ref 0.76–1.27)
CREAT SERPL-MCNC: 1.48 MG/DL (ref 0.76–1.27)
DEPRECATED RDW RBC AUTO: 61.2 FL (ref 37–54)
DEPRECATED RDW RBC AUTO: 61.7 FL (ref 37–54)
DEPRECATED RDW RBC AUTO: 61.7 FL (ref 37–54)
DEPRECATED RDW RBC AUTO: 62.6 FL (ref 37–54)
DEPRECATED RDW RBC AUTO: 64.5 FL (ref 37–54)
EOSINOPHIL # BLD AUTO: 0 10*3/MM3 (ref 0–0.4)
EOSINOPHIL # BLD AUTO: 0.01 10*3/MM3 (ref 0–0.4)
EOSINOPHIL NFR BLD AUTO: 0 % (ref 0.3–6.2)
EOSINOPHIL NFR BLD AUTO: 0.2 % (ref 0.3–6.2)
ERYTHROCYTE [DISTWIDTH] IN BLOOD BY AUTOMATED COUNT: 16.9 % (ref 12.3–15.4)
ERYTHROCYTE [DISTWIDTH] IN BLOOD BY AUTOMATED COUNT: 17 % (ref 12.3–15.4)
ERYTHROCYTE [DISTWIDTH] IN BLOOD BY AUTOMATED COUNT: 17 % (ref 12.3–15.4)
ERYTHROCYTE [DISTWIDTH] IN BLOOD BY AUTOMATED COUNT: 17.1 % (ref 12.3–15.4)
ERYTHROCYTE [DISTWIDTH] IN BLOOD BY AUTOMATED COUNT: 17.1 % (ref 12.3–15.4)
ERYTHROCYTE [DISTWIDTH] IN BLOOD BY AUTOMATED COUNT: 17.3 % (ref 12.3–15.4)
GFR SERPL CREATININE-BSD FRML MDRD: 45 ML/MIN/1.73
GFR SERPL CREATININE-BSD FRML MDRD: 47 ML/MIN/1.73
GFR SERPL CREATININE-BSD FRML MDRD: 51 ML/MIN/1.73
GFR SERPL CREATININE-BSD FRML MDRD: 52 ML/MIN/1.73
GFR SERPL CREATININE-BSD FRML MDRD: 53 ML/MIN/1.73
GFR SERPL CREATININE-BSD FRML MDRD: 59 ML/MIN/1.73
GFR SERPL CREATININE-BSD FRML MDRD: 71 ML/MIN/1.73
GFR SERPL CREATININE-BSD FRML MDRD: 73 ML/MIN/1.73
GFR SERPL CREATININE-BSD FRML MDRD: 73 ML/MIN/1.73
GFR SERPL CREATININE-BSD FRML MDRD: 84 ML/MIN/1.73
GLOBULIN SER CALC-MCNC: 4.2 GM/DL
GLOBULIN UR ELPH-MCNC: 4.1 GM/DL
GLOBULIN UR ELPH-MCNC: 4.3 GM/DL
GLOBULIN UR ELPH-MCNC: 4.7 GM/DL
GLOBULIN UR ELPH-MCNC: 4.7 GM/DL
GLUCOSE BLDC GLUCOMTR-MCNC: 107 MG/DL (ref 70–130)
GLUCOSE SERPL-MCNC: 103 MG/DL (ref 65–99)
GLUCOSE SERPL-MCNC: 109 MG/DL (ref 65–99)
GLUCOSE SERPL-MCNC: 112 MG/DL (ref 65–99)
GLUCOSE SERPL-MCNC: 115 MG/DL (ref 65–99)
GLUCOSE SERPL-MCNC: 125 MG/DL (ref 65–99)
GLUCOSE SERPL-MCNC: 125 MG/DL (ref 65–99)
GLUCOSE SERPL-MCNC: 129 MG/DL (ref 65–99)
GLUCOSE SERPL-MCNC: 129 MG/DL (ref 65–99)
GLUCOSE SERPL-MCNC: 154 MG/DL (ref 65–99)
GLUCOSE SERPL-MCNC: 90 MG/DL (ref 65–99)
GLUCOSE SERPL-MCNC: 98 MG/DL (ref 65–99)
GLUCOSE UR STRIP-MCNC: NEGATIVE MG/DL
GLUCOSE UR STRIP-MCNC: NEGATIVE MG/DL
HCT VFR BLD AUTO: 26.6 % (ref 37.5–51)
HCT VFR BLD AUTO: 27.8 % (ref 37.5–51)
HCT VFR BLD AUTO: 27.9 % (ref 37.5–51)
HCT VFR BLD AUTO: 29.1 % (ref 37.5–51)
HCT VFR BLD AUTO: 29.3 % (ref 37.5–51)
HCT VFR BLD AUTO: 30.6 % (ref 37.5–51)
HDLC SERPL-MCNC: 47 MG/DL (ref 40–60)
HGB BLD-MCNC: 10 G/DL (ref 13–17.7)
HGB BLD-MCNC: 8.5 G/DL (ref 13–17.7)
HGB BLD-MCNC: 9.5 G/DL (ref 13–17.7)
HGB BLD-MCNC: 9.5 G/DL (ref 13–17.7)
HGB BLD-MCNC: 9.6 G/DL (ref 13–17.7)
HGB BLD-MCNC: 9.9 G/DL (ref 13–17.7)
HGB UR QL STRIP.AUTO: ABNORMAL
HGB UR QL STRIP.AUTO: ABNORMAL
HYALINE CASTS UR QL AUTO: ABNORMAL /LPF
HYALINE CASTS UR QL AUTO: ABNORMAL /LPF
IMM GRANULOCYTES # BLD AUTO: 0.02 10*3/MM3 (ref 0–0.05)
IMM GRANULOCYTES # BLD AUTO: 0.02 10*3/MM3 (ref 0–0.05)
IMM GRANULOCYTES # BLD AUTO: 0.04 10*3/MM3 (ref 0–0.05)
IMM GRANULOCYTES # BLD AUTO: 0.04 10*3/MM3 (ref 0–0.05)
IMM GRANULOCYTES NFR BLD AUTO: 0.4 % (ref 0–0.5)
IMM GRANULOCYTES NFR BLD AUTO: 0.4 % (ref 0–0.5)
IMM GRANULOCYTES NFR BLD AUTO: 0.7 % (ref 0–0.5)
IMM GRANULOCYTES NFR BLD AUTO: 0.7 % (ref 0–0.5)
KETONES UR QL STRIP: NEGATIVE
KETONES UR QL STRIP: NEGATIVE
LDLC SERPL CALC-MCNC: 62 MG/DL (ref 0–100)
LEUKOCYTE ESTERASE UR QL STRIP.AUTO: ABNORMAL
LEUKOCYTE ESTERASE UR QL STRIP.AUTO: ABNORMAL
LYMPHOCYTES # BLD AUTO: 0.62 10*3/MM3 (ref 0.7–3.1)
LYMPHOCYTES # BLD AUTO: 0.87 10*3/MM3 (ref 0.7–3.1)
LYMPHOCYTES # BLD AUTO: 0.99 10*3/MM3 (ref 0.7–3.1)
LYMPHOCYTES # BLD AUTO: 1.21 10*3/MM3 (ref 0.7–3.1)
LYMPHOCYTES NFR BLD AUTO: 13.3 % (ref 19.6–45.3)
LYMPHOCYTES NFR BLD AUTO: 16.4 % (ref 19.6–45.3)
LYMPHOCYTES NFR BLD AUTO: 17.4 % (ref 19.6–45.3)
LYMPHOCYTES NFR BLD AUTO: 20.4 % (ref 19.6–45.3)
MCH RBC QN AUTO: 33 PG (ref 26.6–33)
MCH RBC QN AUTO: 33.3 PG (ref 26.6–33)
MCH RBC QN AUTO: 33.3 PG (ref 26.6–33)
MCH RBC QN AUTO: 33.6 PG (ref 26.6–33)
MCH RBC QN AUTO: 33.7 PG (ref 26.6–33)
MCH RBC QN AUTO: 34.2 PG (ref 26.6–33)
MCHC RBC AUTO-ENTMCNC: 32 G/DL (ref 31.5–35.7)
MCHC RBC AUTO-ENTMCNC: 32.4 G/DL (ref 31.5–35.7)
MCHC RBC AUTO-ENTMCNC: 33 G/DL (ref 31.5–35.7)
MCHC RBC AUTO-ENTMCNC: 34.1 G/DL (ref 31.5–35.7)
MCHC RBC AUTO-ENTMCNC: 34.1 G/DL (ref 31.5–35.7)
MCHC RBC AUTO-ENTMCNC: 34.2 G/DL (ref 31.5–35.7)
MCV RBC AUTO: 100 FL (ref 79–97)
MCV RBC AUTO: 100.3 FL (ref 79–97)
MCV RBC AUTO: 103 FL (ref 79–97)
MCV RBC AUTO: 104.3 FL (ref 79–97)
MCV RBC AUTO: 98.2 FL (ref 79–97)
MCV RBC AUTO: 98.9 FL (ref 79–97)
MONOCYTES # BLD AUTO: 0.5 10*3/MM3 (ref 0.1–0.9)
MONOCYTES # BLD AUTO: 0.54 10*3/MM3 (ref 0.1–0.9)
MONOCYTES # BLD AUTO: 0.57 10*3/MM3 (ref 0.1–0.9)
MONOCYTES # BLD AUTO: 0.61 10*3/MM3 (ref 0.1–0.9)
MONOCYTES NFR BLD AUTO: 12.2 % (ref 5–12)
MONOCYTES NFR BLD AUTO: 12.2 % (ref 5–12)
MONOCYTES NFR BLD AUTO: 8.4 % (ref 5–12)
MONOCYTES NFR BLD AUTO: 8.9 % (ref 5–12)
NEUTROPHILS NFR BLD AUTO: 3.45 10*3/MM3 (ref 1.7–7)
NEUTROPHILS NFR BLD AUTO: 3.5 10*3/MM3 (ref 1.7–7)
NEUTROPHILS NFR BLD AUTO: 4.16 10*3/MM3 (ref 1.7–7)
NEUTROPHILS NFR BLD AUTO: 4.48 10*3/MM3 (ref 1.7–7)
NEUTROPHILS NFR BLD AUTO: 70 % (ref 42.7–76)
NEUTROPHILS NFR BLD AUTO: 70.3 % (ref 42.7–76)
NEUTROPHILS NFR BLD AUTO: 74 % (ref 42.7–76)
NEUTROPHILS NFR BLD AUTO: 74.1 % (ref 42.7–76)
NITRITE UR QL STRIP: NEGATIVE
NITRITE UR QL STRIP: POSITIVE
NRBC BLD AUTO-RTO: 0 /100 WBC (ref 0–0.2)
OSMOLALITY SERPL: 312 MOSM/KG (ref 280–301)
OSMOLALITY UR: 638 MOSM/KG
PH UR STRIP.AUTO: 5.5 [PH] (ref 5–8)
PH UR STRIP.AUTO: 8.5 [PH] (ref 5–8)
PLATELET # BLD AUTO: 134 10*3/MM3 (ref 140–450)
PLATELET # BLD AUTO: 140 10*3/MM3 (ref 140–450)
PLATELET # BLD AUTO: 149 10*3/MM3 (ref 140–450)
PLATELET # BLD AUTO: 150 10*3/MM3 (ref 140–450)
PLATELET # BLD AUTO: 158 10*3/MM3 (ref 140–450)
PLATELET # BLD AUTO: 162 10*3/MM3 (ref 140–450)
PMV BLD AUTO: 10.1 FL (ref 6–12)
PMV BLD AUTO: 10.1 FL (ref 6–12)
PMV BLD AUTO: 9.7 FL (ref 6–12)
PMV BLD AUTO: 9.7 FL (ref 6–12)
PMV BLD AUTO: 9.9 FL (ref 6–12)
POTASSIUM SERPL-SCNC: 3.8 MMOL/L (ref 3.5–5.2)
POTASSIUM SERPL-SCNC: 3.8 MMOL/L (ref 3.5–5.2)
POTASSIUM SERPL-SCNC: 4 MMOL/L (ref 3.5–5.2)
POTASSIUM SERPL-SCNC: 4.1 MMOL/L (ref 3.5–5.2)
POTASSIUM SERPL-SCNC: 4.2 MMOL/L (ref 3.5–5.2)
POTASSIUM SERPL-SCNC: 4.3 MMOL/L (ref 3.5–5.2)
POTASSIUM SERPL-SCNC: 4.3 MMOL/L (ref 3.5–5.2)
POTASSIUM SERPL-SCNC: 4.4 MMOL/L (ref 3.5–5.2)
POTASSIUM SERPL-SCNC: 4.4 MMOL/L (ref 3.5–5.2)
POTASSIUM SERPL-SCNC: 4.5 MMOL/L (ref 3.5–5.2)
POTASSIUM SERPL-SCNC: 4.8 MMOL/L (ref 3.5–5.2)
PROT SERPL-MCNC: 7.5 G/DL (ref 6–8.5)
PROT SERPL-MCNC: 7.6 G/DL (ref 6–8.5)
PROT SERPL-MCNC: 7.6 G/DL (ref 6–8.5)
PROT SERPL-MCNC: 7.7 G/DL (ref 6–8.5)
PROT SERPL-MCNC: 7.7 G/DL (ref 6–8.5)
PROT UR QL STRIP: ABNORMAL
PROT UR QL STRIP: ABNORMAL
RBC # BLD AUTO: 2.55 10*6/MM3 (ref 4.14–5.8)
RBC # BLD AUTO: 2.82 10*6/MM3 (ref 4.14–5.8)
RBC # BLD AUTO: 2.83 10*6/MM3 (ref 4.14–5.8)
RBC # BLD AUTO: 2.91 10*6/MM3 (ref 4.14–5.8)
RBC # BLD AUTO: 2.92 10*6/MM3 (ref 4.14–5.8)
RBC # BLD AUTO: 2.97 10*6/MM3 (ref 4.14–5.8)
RBC # UR: ABNORMAL /HPF
RBC # UR: ABNORMAL /HPF
REF LAB TEST METHOD: ABNORMAL
REF LAB TEST METHOD: ABNORMAL
REF LAB TEST METHOD: NORMAL
SARS-COV-2 RNA RESP QL NAA+PROBE: NOT DETECTED
SODIUM SERPL-SCNC: 127 MMOL/L (ref 136–145)
SODIUM SERPL-SCNC: 133 MMOL/L (ref 136–145)
SODIUM SERPL-SCNC: 133 MMOL/L (ref 136–145)
SODIUM SERPL-SCNC: 135 MMOL/L (ref 136–145)
SODIUM SERPL-SCNC: 136 MMOL/L (ref 136–145)
SODIUM SERPL-SCNC: 137 MMOL/L (ref 136–145)
SODIUM SERPL-SCNC: 137 MMOL/L (ref 136–145)
SODIUM SERPL-SCNC: 138 MMOL/L (ref 136–145)
SODIUM UR-SCNC: 79 MMOL/L
SP GR UR STRIP: 1.01 (ref 1–1.03)
SP GR UR STRIP: 1.02 (ref 1–1.03)
SQUAMOUS #/AREA URNS HPF: ABNORMAL /HPF
SQUAMOUS #/AREA URNS HPF: ABNORMAL /HPF
T4 FREE SERPL-MCNC: 1.52 NG/DL (ref 0.93–1.7)
TRIGL SERPL-MCNC: 139 MG/DL (ref 0–150)
TSH SERPL DL<=0.05 MIU/L-ACNC: 2.22 UIU/ML (ref 0.27–4.2)
UROBILINOGEN UR QL STRIP: ABNORMAL
UROBILINOGEN UR QL STRIP: ABNORMAL
VLDLC SERPL CALC-MCNC: 27.8 MG/DL
WBC # BLD AUTO: 4.66 10*3/MM3 (ref 3.4–10.8)
WBC # BLD AUTO: 4.68 10*3/MM3 (ref 3.4–10.8)
WBC # BLD AUTO: 4.71 10*3/MM3 (ref 3.4–10.8)
WBC # BLD AUTO: 5 10*3/MM3 (ref 3.4–10.8)
WBC # BLD AUTO: 5.92 10*3/MM3 (ref 3.4–10.8)
WBC # BLD AUTO: 6.05 10*3/MM3 (ref 3.4–10.8)
WBC UR QL AUTO: ABNORMAL /HPF
WBC UR QL AUTO: ABNORMAL /HPF

## 2020-01-01 PROCEDURE — 80048 BASIC METABOLIC PNL TOTAL CA: CPT | Performed by: HOSPITALIST

## 2020-01-01 PROCEDURE — 82962 GLUCOSE BLOOD TEST: CPT

## 2020-01-01 PROCEDURE — 80053 COMPREHEN METABOLIC PANEL: CPT | Performed by: PHYSICAL MEDICINE & REHABILITATION

## 2020-01-01 PROCEDURE — 83930 ASSAY OF BLOOD OSMOLALITY: CPT | Performed by: HOSPITALIST

## 2020-01-01 PROCEDURE — 85025 COMPLETE CBC W/AUTO DIFF WBC: CPT | Performed by: PHYSICAL MEDICINE & REHABILITATION

## 2020-01-01 PROCEDURE — 63710000001 DEXAMETHASONE PER 0.25 MG: Performed by: PHYSICAL MEDICINE & REHABILITATION

## 2020-01-01 PROCEDURE — G0008 ADMIN INFLUENZA VIRUS VAC: HCPCS | Performed by: NURSE PRACTITIONER

## 2020-01-01 PROCEDURE — 97166 OT EVAL MOD COMPLEX 45 MIN: CPT

## 2020-01-01 PROCEDURE — 99214 OFFICE O/P EST MOD 30 MIN: CPT | Performed by: NURSE PRACTITIONER

## 2020-01-01 PROCEDURE — 87186 SC STD MICRODIL/AGAR DIL: CPT | Performed by: PHYSICAL MEDICINE & REHABILITATION

## 2020-01-01 PROCEDURE — 25010000002 ONDANSETRON PER 1 MG: Performed by: NURSE ANESTHETIST, CERTIFIED REGISTERED

## 2020-01-01 PROCEDURE — 92507 TX SP LANG VOICE COMM INDIV: CPT

## 2020-01-01 PROCEDURE — 97110 THERAPEUTIC EXERCISES: CPT

## 2020-01-01 PROCEDURE — C2617 STENT, NON-COR, TEM W/O DEL: HCPCS | Performed by: UROLOGY

## 2020-01-01 PROCEDURE — 0 IOVERSOL 74 % SOLUTION: Performed by: UROLOGY

## 2020-01-01 PROCEDURE — 97535 SELF CARE MNGMENT TRAINING: CPT

## 2020-01-01 PROCEDURE — 97530 THERAPEUTIC ACTIVITIES: CPT

## 2020-01-01 PROCEDURE — 87086 URINE CULTURE/COLONY COUNT: CPT | Performed by: PHYSICAL MEDICINE & REHABILITATION

## 2020-01-01 PROCEDURE — 85027 COMPLETE CBC AUTOMATED: CPT | Performed by: PHYSICAL MEDICINE & REHABILITATION

## 2020-01-01 PROCEDURE — 84300 ASSAY OF URINE SODIUM: CPT | Performed by: HOSPITALIST

## 2020-01-01 PROCEDURE — 87086 URINE CULTURE/COLONY COUNT: CPT | Performed by: HOSPITALIST

## 2020-01-01 PROCEDURE — 84439 ASSAY OF FREE THYROXINE: CPT | Performed by: HOSPITALIST

## 2020-01-01 PROCEDURE — C1758 CATHETER, URETERAL: HCPCS | Performed by: UROLOGY

## 2020-01-01 PROCEDURE — 25010000002 FENTANYL CITRATE (PF) 100 MCG/2ML SOLUTION: Performed by: NURSE ANESTHETIST, CERTIFIED REGISTERED

## 2020-01-01 PROCEDURE — 71046 X-RAY EXAM CHEST 2 VIEWS: CPT

## 2020-01-01 PROCEDURE — 25010000003 CEFAZOLIN IN DEXTROSE 2-4 GM/100ML-% SOLUTION: Performed by: UROLOGY

## 2020-01-01 PROCEDURE — 85025 COMPLETE CBC W/AUTO DIFF WBC: CPT | Performed by: HOSPITALIST

## 2020-01-01 PROCEDURE — 96105 ASSESSMENT OF APHASIA: CPT

## 2020-01-01 PROCEDURE — 25010000002 PROPOFOL 10 MG/ML EMULSION: Performed by: NURSE ANESTHETIST, CERTIFIED REGISTERED

## 2020-01-01 PROCEDURE — 97112 NEUROMUSCULAR REEDUCATION: CPT

## 2020-01-01 PROCEDURE — C1769 GUIDE WIRE: HCPCS | Performed by: UROLOGY

## 2020-01-01 PROCEDURE — 87077 CULTURE AEROBIC IDENTIFY: CPT | Performed by: PHYSICAL MEDICINE & REHABILITATION

## 2020-01-01 PROCEDURE — U0004 COV-19 TEST NON-CDC HGH THRU: HCPCS | Performed by: NURSE PRACTITIONER

## 2020-01-01 PROCEDURE — 97112 NEUROMUSCULAR REEDUCATION: CPT | Performed by: OCCUPATIONAL THERAPIST

## 2020-01-01 PROCEDURE — C9803 HOPD COVID-19 SPEC COLLECT: HCPCS

## 2020-01-01 PROCEDURE — 97162 PT EVAL MOD COMPLEX 30 MIN: CPT | Performed by: PHYSICAL THERAPIST

## 2020-01-01 PROCEDURE — 80048 BASIC METABOLIC PNL TOTAL CA: CPT | Performed by: PHYSICAL MEDICINE & REHABILITATION

## 2020-01-01 PROCEDURE — 81001 URINALYSIS AUTO W/SCOPE: CPT | Performed by: PHYSICAL MEDICINE & REHABILITATION

## 2020-01-01 PROCEDURE — 81001 URINALYSIS AUTO W/SCOPE: CPT | Performed by: HOSPITALIST

## 2020-01-01 PROCEDURE — 83935 ASSAY OF URINE OSMOLALITY: CPT | Performed by: HOSPITALIST

## 2020-01-01 PROCEDURE — G0439 PPPS, SUBSEQ VISIT: HCPCS | Performed by: NURSE PRACTITIONER

## 2020-01-01 PROCEDURE — 97535 SELF CARE MNGMENT TRAINING: CPT | Performed by: OCCUPATIONAL THERAPIST

## 2020-01-01 PROCEDURE — 84443 ASSAY THYROID STIM HORMONE: CPT | Performed by: HOSPITALIST

## 2020-01-01 PROCEDURE — 90694 VACC AIIV4 NO PRSRV 0.5ML IM: CPT | Performed by: NURSE PRACTITIONER

## 2020-01-01 PROCEDURE — 74420 UROGRAPHY RTRGR +-KUB: CPT

## 2020-01-01 DEVICE — URETERAL STENT
Type: IMPLANTABLE DEVICE | Status: FUNCTIONAL
Brand: CONTOUR™

## 2020-01-01 RX ORDER — CEFAZOLIN SODIUM 2 G/100ML
2 INJECTION, SOLUTION INTRAVENOUS ONCE
Status: COMPLETED | OUTPATIENT
Start: 2020-01-01 | End: 2020-01-01

## 2020-01-01 RX ORDER — DOCUSATE SODIUM 50 MG/5 ML
100 LIQUID (ML) ORAL 2 TIMES DAILY
Qty: 60 ML | Refills: 0 | Status: SHIPPED | OUTPATIENT
Start: 2020-01-01

## 2020-01-01 RX ORDER — ACETAMINOPHEN 500 MG
500 TABLET ORAL DAILY PRN
Status: DISCONTINUED | OUTPATIENT
Start: 2020-01-01 | End: 2020-01-01

## 2020-01-01 RX ORDER — FLUTICASONE PROPIONATE 50 MCG
1 SPRAY, SUSPENSION (ML) NASAL DAILY
Status: DISCONTINUED | OUTPATIENT
Start: 2020-01-01 | End: 2020-01-01 | Stop reason: HOSPADM

## 2020-01-01 RX ORDER — AMLODIPINE BESYLATE 5 MG/1
TABLET ORAL
Qty: 90 TABLET | Refills: 0 | Status: SHIPPED | OUTPATIENT
Start: 2020-01-01 | End: 2020-01-01 | Stop reason: HOSPADM

## 2020-01-01 RX ORDER — AMLODIPINE BESYLATE 10 MG/1
10 TABLET ORAL
Qty: 30 TABLET | Refills: 1 | Status: SHIPPED | OUTPATIENT
Start: 2020-01-01 | End: 2020-01-01 | Stop reason: SDUPTHER

## 2020-01-01 RX ORDER — ONDANSETRON 2 MG/ML
INJECTION INTRAMUSCULAR; INTRAVENOUS AS NEEDED
Status: DISCONTINUED | OUTPATIENT
Start: 2020-01-01 | End: 2020-01-01 | Stop reason: SURG

## 2020-01-01 RX ORDER — MULTIPLE VITAMINS W/ MINERALS TAB 9MG-400MCG
1 TAB ORAL DAILY
Status: DISCONTINUED | OUTPATIENT
Start: 2020-01-01 | End: 2020-01-01 | Stop reason: HOSPADM

## 2020-01-01 RX ORDER — AMLODIPINE BESYLATE 10 MG/1
10 TABLET ORAL
Status: DISCONTINUED | OUTPATIENT
Start: 2020-01-01 | End: 2020-01-01 | Stop reason: HOSPADM

## 2020-01-01 RX ORDER — CLONAZEPAM 0.5 MG/1
0.25 TABLET ORAL DAILY PRN
Qty: 7 TABLET | Refills: 0 | Status: SHIPPED | OUTPATIENT
Start: 2020-01-01 | End: 2020-01-01

## 2020-01-01 RX ORDER — DEXAMETHASONE 4 MG/1
2 TABLET ORAL DAILY
Status: DISCONTINUED | OUTPATIENT
Start: 2020-01-01 | End: 2020-01-01 | Stop reason: HOSPADM

## 2020-01-01 RX ORDER — MEPERIDINE HYDROCHLORIDE 25 MG/ML
12.5 INJECTION INTRAMUSCULAR; INTRAVENOUS; SUBCUTANEOUS ONCE
Status: DISCONTINUED | OUTPATIENT
Start: 2020-01-01 | End: 2020-01-01 | Stop reason: HOSPADM

## 2020-01-01 RX ORDER — ONDANSETRON 2 MG/ML
4 INJECTION INTRAMUSCULAR; INTRAVENOUS ONCE AS NEEDED
Status: DISCONTINUED | OUTPATIENT
Start: 2020-01-01 | End: 2020-01-01 | Stop reason: HOSPADM

## 2020-01-01 RX ORDER — SENNA PLUS 8.6 MG/1
1 TABLET ORAL NIGHTLY PRN
Status: DISCONTINUED | OUTPATIENT
Start: 2020-01-01 | End: 2020-01-01 | Stop reason: HOSPADM

## 2020-01-01 RX ORDER — HYDROMORPHONE HYDROCHLORIDE 1 MG/ML
0.5 INJECTION, SOLUTION INTRAMUSCULAR; INTRAVENOUS; SUBCUTANEOUS
Status: DISCONTINUED | OUTPATIENT
Start: 2020-01-01 | End: 2020-01-01 | Stop reason: HOSPADM

## 2020-01-01 RX ORDER — OLANZAPINE 5 MG/1
5 TABLET ORAL DAILY PRN
Status: DISCONTINUED | OUTPATIENT
Start: 2020-01-01 | End: 2020-01-01

## 2020-01-01 RX ORDER — PROPOFOL 10 MG/ML
VIAL (ML) INTRAVENOUS AS NEEDED
Status: DISCONTINUED | OUTPATIENT
Start: 2020-01-01 | End: 2020-01-01 | Stop reason: SURG

## 2020-01-01 RX ORDER — MELATONIN
2000 DAILY
Status: DISCONTINUED | OUTPATIENT
Start: 2020-01-01 | End: 2020-01-01 | Stop reason: HOSPADM

## 2020-01-01 RX ORDER — FAMOTIDINE 10 MG/ML
20 INJECTION, SOLUTION INTRAVENOUS ONCE
Status: COMPLETED | OUTPATIENT
Start: 2020-01-01 | End: 2020-01-01

## 2020-01-01 RX ORDER — IPRATROPIUM BROMIDE AND ALBUTEROL SULFATE 2.5; .5 MG/3ML; MG/3ML
3 SOLUTION RESPIRATORY (INHALATION) ONCE AS NEEDED
Status: DISCONTINUED | OUTPATIENT
Start: 2020-01-01 | End: 2020-01-01 | Stop reason: HOSPADM

## 2020-01-01 RX ORDER — FLUMAZENIL 0.1 MG/ML
0.2 INJECTION INTRAVENOUS AS NEEDED
Status: DISCONTINUED | OUTPATIENT
Start: 2020-01-01 | End: 2020-01-01 | Stop reason: HOSPADM

## 2020-01-01 RX ORDER — DEXTROSE, SODIUM CHLORIDE, AND POTASSIUM CHLORIDE 5; .9; .15 G/100ML; G/100ML; G/100ML
75 INJECTION INTRAVENOUS CONTINUOUS
Status: DISPENSED | OUTPATIENT
Start: 2020-01-01 | End: 2020-01-01

## 2020-01-01 RX ORDER — TRAZODONE HYDROCHLORIDE 50 MG/1
50 TABLET ORAL NIGHTLY
Qty: 30 TABLET | Refills: 1 | Status: SHIPPED | OUTPATIENT
Start: 2020-01-01 | End: 2020-01-01 | Stop reason: HOSPADM

## 2020-01-01 RX ORDER — PROMETHAZINE HYDROCHLORIDE 25 MG/1
25 TABLET ORAL ONCE AS NEEDED
Status: DISCONTINUED | OUTPATIENT
Start: 2020-01-01 | End: 2020-01-01 | Stop reason: HOSPADM

## 2020-01-01 RX ORDER — FENTANYL CITRATE 50 UG/ML
50 INJECTION, SOLUTION INTRAMUSCULAR; INTRAVENOUS
Status: DISCONTINUED | OUTPATIENT
Start: 2020-01-01 | End: 2020-01-01 | Stop reason: HOSPADM

## 2020-01-01 RX ORDER — SODIUM CHLORIDE 9 MG/ML
100 INJECTION, SOLUTION INTRAVENOUS CONTINUOUS
Status: DISCONTINUED | OUTPATIENT
Start: 2020-01-01 | End: 2020-01-01

## 2020-01-01 RX ORDER — LISINOPRIL 40 MG/1
40 TABLET ORAL
Status: DISCONTINUED | OUTPATIENT
Start: 2020-01-01 | End: 2020-01-01 | Stop reason: HOSPADM

## 2020-01-01 RX ORDER — PROMETHAZINE HYDROCHLORIDE 25 MG/1
25 SUPPOSITORY RECTAL ONCE AS NEEDED
Status: DISCONTINUED | OUTPATIENT
Start: 2020-01-01 | End: 2020-01-01 | Stop reason: HOSPADM

## 2020-01-01 RX ORDER — FENTANYL CITRATE 50 UG/ML
INJECTION, SOLUTION INTRAMUSCULAR; INTRAVENOUS AS NEEDED
Status: DISCONTINUED | OUTPATIENT
Start: 2020-01-01 | End: 2020-01-01 | Stop reason: SURG

## 2020-01-01 RX ORDER — DIPHENHYDRAMINE HYDROCHLORIDE 50 MG/ML
12.5 INJECTION INTRAMUSCULAR; INTRAVENOUS
Status: DISCONTINUED | OUTPATIENT
Start: 2020-01-01 | End: 2020-01-01 | Stop reason: HOSPADM

## 2020-01-01 RX ORDER — ATORVASTATIN CALCIUM 20 MG/1
20 TABLET, FILM COATED ORAL DAILY
Status: DISCONTINUED | OUTPATIENT
Start: 2020-01-01 | End: 2020-01-01 | Stop reason: HOSPADM

## 2020-01-01 RX ORDER — LISINOPRIL 40 MG/1
40 TABLET ORAL
Qty: 90 TABLET | Refills: 1 | Status: SHIPPED | OUTPATIENT
Start: 2020-01-01

## 2020-01-01 RX ORDER — DEXAMETHASONE 0.5 MG/1
0.25 TABLET ORAL
COMMUNITY
Start: 2020-01-01

## 2020-01-01 RX ORDER — HYDRALAZINE HYDROCHLORIDE 10 MG/1
10 TABLET, FILM COATED ORAL EVERY 6 HOURS PRN
Qty: 30 TABLET | Refills: 0 | Status: SHIPPED | OUTPATIENT
Start: 2020-01-01

## 2020-01-01 RX ORDER — HYDRALAZINE HYDROCHLORIDE 10 MG/1
10 TABLET, FILM COATED ORAL EVERY 6 HOURS PRN
Status: DISCONTINUED | OUTPATIENT
Start: 2020-01-01 | End: 2020-01-01 | Stop reason: HOSPADM

## 2020-01-01 RX ORDER — LEVETIRACETAM 750 MG/1
750 TABLET ORAL EVERY EVENING
COMMUNITY
End: 2020-01-01 | Stop reason: HOSPADM

## 2020-01-01 RX ORDER — SODIUM CHLORIDE 0.9 % (FLUSH) 0.9 %
3 SYRINGE (ML) INJECTION EVERY 12 HOURS SCHEDULED
Status: DISCONTINUED | OUTPATIENT
Start: 2020-01-01 | End: 2020-01-01 | Stop reason: HOSPADM

## 2020-01-01 RX ORDER — EPHEDRINE SULFATE 50 MG/ML
5 INJECTION, SOLUTION INTRAVENOUS ONCE AS NEEDED
Status: DISCONTINUED | OUTPATIENT
Start: 2020-01-01 | End: 2020-01-01 | Stop reason: HOSPADM

## 2020-01-01 RX ORDER — DEXAMETHASONE 2 MG/1
2 TABLET ORAL
Qty: 30 TABLET | Refills: 1 | Status: SHIPPED | OUTPATIENT
Start: 2020-01-01

## 2020-01-01 RX ORDER — DIPHENHYDRAMINE HCL 25 MG
25 CAPSULE ORAL
Status: DISCONTINUED | OUTPATIENT
Start: 2020-01-01 | End: 2020-01-01 | Stop reason: HOSPADM

## 2020-01-01 RX ORDER — ISOSORBIDE MONONITRATE 30 MG/1
30 TABLET, EXTENDED RELEASE ORAL
Status: DISCONTINUED | OUTPATIENT
Start: 2020-01-01 | End: 2020-01-01 | Stop reason: HOSPADM

## 2020-01-01 RX ORDER — PROMETHAZINE HYDROCHLORIDE 25 MG/ML
12.5 INJECTION, SOLUTION INTRAMUSCULAR; INTRAVENOUS ONCE AS NEEDED
Status: DISCONTINUED | OUTPATIENT
Start: 2020-01-01 | End: 2020-01-01 | Stop reason: HOSPADM

## 2020-01-01 RX ORDER — LABETALOL HYDROCHLORIDE 5 MG/ML
5 INJECTION, SOLUTION INTRAVENOUS
Status: DISCONTINUED | OUTPATIENT
Start: 2020-01-01 | End: 2020-01-01 | Stop reason: HOSPADM

## 2020-01-01 RX ORDER — AMLODIPINE BESYLATE 10 MG/1
10 TABLET ORAL
Qty: 90 TABLET | Refills: 1 | Status: SHIPPED | OUTPATIENT
Start: 2020-01-01

## 2020-01-01 RX ORDER — NALOXONE HCL 0.4 MG/ML
0.2 VIAL (ML) INJECTION AS NEEDED
Status: DISCONTINUED | OUTPATIENT
Start: 2020-01-01 | End: 2020-01-01 | Stop reason: HOSPADM

## 2020-01-01 RX ORDER — DEXAMETHASONE 4 MG/1
4 TABLET ORAL 2 TIMES DAILY
Status: COMPLETED | OUTPATIENT
Start: 2020-01-01 | End: 2020-01-01

## 2020-01-01 RX ORDER — SENNA PLUS 8.6 MG/1
1 TABLET ORAL NIGHTLY PRN
Start: 2020-01-01

## 2020-01-01 RX ORDER — MAGNESIUM HYDROXIDE 1200 MG/15ML
LIQUID ORAL AS NEEDED
Status: DISCONTINUED | OUTPATIENT
Start: 2020-01-01 | End: 2020-01-01 | Stop reason: HOSPADM

## 2020-01-01 RX ORDER — TRAZODONE HYDROCHLORIDE 50 MG/1
50 TABLET ORAL NIGHTLY PRN
Status: DISCONTINUED | OUTPATIENT
Start: 2020-01-01 | End: 2020-01-01 | Stop reason: HOSPADM

## 2020-01-01 RX ORDER — LISINOPRIL 40 MG/1
40 TABLET ORAL
Qty: 30 TABLET | Refills: 1 | Status: SHIPPED | OUTPATIENT
Start: 2020-01-01 | End: 2020-01-01 | Stop reason: SDUPTHER

## 2020-01-01 RX ORDER — DOCUSATE SODIUM 100 MG/1
100 CAPSULE, LIQUID FILLED ORAL 2 TIMES DAILY
Status: DISCONTINUED | OUTPATIENT
Start: 2020-01-01 | End: 2020-01-01 | Stop reason: ALTCHOICE

## 2020-01-01 RX ORDER — CEPHALEXIN 500 MG/1
500 CAPSULE ORAL 3 TIMES DAILY
Qty: 9 CAPSULE | Refills: 0 | Status: SHIPPED | OUTPATIENT
Start: 2020-01-01 | End: 2020-01-01

## 2020-01-01 RX ORDER — HYDRALAZINE HYDROCHLORIDE 20 MG/ML
5 INJECTION INTRAMUSCULAR; INTRAVENOUS
Status: DISCONTINUED | OUTPATIENT
Start: 2020-01-01 | End: 2020-01-01 | Stop reason: HOSPADM

## 2020-01-01 RX ORDER — TRAZODONE HYDROCHLORIDE 50 MG/1
50 TABLET ORAL NIGHTLY PRN
Start: 2020-01-01

## 2020-01-01 RX ORDER — ACETAMINOPHEN 325 MG/1
650 TABLET ORAL ONCE AS NEEDED
Status: DISCONTINUED | OUTPATIENT
Start: 2020-01-01 | End: 2020-01-01 | Stop reason: HOSPADM

## 2020-01-01 RX ORDER — TRAZODONE HYDROCHLORIDE 50 MG/1
50 TABLET ORAL NIGHTLY
Status: DISCONTINUED | OUTPATIENT
Start: 2020-01-01 | End: 2020-01-01

## 2020-01-01 RX ORDER — TRAMADOL HYDROCHLORIDE 50 MG/1
50 TABLET ORAL ONCE AS NEEDED
Status: DISCONTINUED | OUTPATIENT
Start: 2020-01-01 | End: 2020-01-01 | Stop reason: HOSPADM

## 2020-01-01 RX ORDER — AMLODIPINE BESYLATE 5 MG/1
TABLET ORAL
Qty: 90 TABLET | Refills: 0 | Status: SHIPPED | OUTPATIENT
Start: 2020-01-01 | End: 2020-01-01

## 2020-01-01 RX ORDER — DOCUSATE SODIUM 50 MG/5 ML
100 LIQUID (ML) ORAL 2 TIMES DAILY
Status: DISCONTINUED | OUTPATIENT
Start: 2020-01-01 | End: 2020-01-01 | Stop reason: HOSPADM

## 2020-01-01 RX ORDER — LEVOFLOXACIN 250 MG/1
250 TABLET ORAL EVERY 24 HOURS
Status: DISCONTINUED | OUTPATIENT
Start: 2020-01-01 | End: 2020-01-01

## 2020-01-01 RX ORDER — CLONAZEPAM 0.5 MG/1
0.25 TABLET ORAL DAILY PRN
Status: DISCONTINUED | OUTPATIENT
Start: 2020-01-01 | End: 2020-01-01 | Stop reason: HOSPADM

## 2020-01-01 RX ORDER — ATORVASTATIN CALCIUM 20 MG/1
TABLET, FILM COATED ORAL
Qty: 90 TABLET | Refills: 1 | Status: SHIPPED | OUTPATIENT
Start: 2020-01-01 | End: 2021-01-01

## 2020-01-01 RX ORDER — LEVETIRACETAM 500 MG/1
500 TABLET ORAL EVERY 12 HOURS SCHEDULED
Status: DISCONTINUED | OUTPATIENT
Start: 2020-01-01 | End: 2020-01-01 | Stop reason: HOSPADM

## 2020-01-01 RX ORDER — SODIUM CHLORIDE, SODIUM LACTATE, POTASSIUM CHLORIDE, CALCIUM CHLORIDE 600; 310; 30; 20 MG/100ML; MG/100ML; MG/100ML; MG/100ML
9 INJECTION, SOLUTION INTRAVENOUS CONTINUOUS
Status: DISCONTINUED | OUTPATIENT
Start: 2020-01-01 | End: 2020-01-01 | Stop reason: HOSPADM

## 2020-01-01 RX ORDER — LEVETIRACETAM 500 MG/1
500 TABLET ORAL 2 TIMES DAILY
Start: 2020-01-01

## 2020-01-01 RX ORDER — LIDOCAINE HYDROCHLORIDE 10 MG/ML
0.5 INJECTION, SOLUTION EPIDURAL; INFILTRATION; INTRACAUDAL; PERINEURAL ONCE AS NEEDED
Status: DISCONTINUED | OUTPATIENT
Start: 2020-01-01 | End: 2020-01-01 | Stop reason: HOSPADM

## 2020-01-01 RX ORDER — FAMOTIDINE 20 MG/1
20 TABLET, FILM COATED ORAL DAILY
Status: DISCONTINUED | OUTPATIENT
Start: 2020-01-01 | End: 2020-01-01 | Stop reason: HOSPADM

## 2020-01-01 RX ORDER — ACETAMINOPHEN 500 MG
500 TABLET ORAL 2 TIMES DAILY PRN
Status: DISCONTINUED | OUTPATIENT
Start: 2020-01-01 | End: 2020-01-01 | Stop reason: HOSPADM

## 2020-01-01 RX ORDER — SODIUM CHLORIDE 0.9 % (FLUSH) 0.9 %
3-10 SYRINGE (ML) INJECTION AS NEEDED
Status: DISCONTINUED | OUTPATIENT
Start: 2020-01-01 | End: 2020-01-01 | Stop reason: HOSPADM

## 2020-01-01 RX ORDER — CEPHALEXIN 500 MG/1
500 CAPSULE ORAL EVERY 12 HOURS SCHEDULED
Status: COMPLETED | OUTPATIENT
Start: 2020-01-01 | End: 2020-01-01

## 2020-01-01 RX ORDER — PROMETHAZINE HYDROCHLORIDE 25 MG/ML
6.25 INJECTION, SOLUTION INTRAMUSCULAR; INTRAVENOUS
Status: DISCONTINUED | OUTPATIENT
Start: 2020-01-01 | End: 2020-01-01 | Stop reason: HOSPADM

## 2020-01-01 RX ORDER — DEXAMETHASONE 4 MG/1
2 TABLET ORAL 2 TIMES DAILY
Status: COMPLETED | OUTPATIENT
Start: 2020-01-01 | End: 2020-01-01

## 2020-01-01 RX ADMIN — DEXAMETHASONE 2 MG: 4 TABLET ORAL at 07:24

## 2020-01-01 RX ADMIN — ATORVASTATIN CALCIUM 20 MG: 20 TABLET, FILM COATED ORAL at 09:32

## 2020-01-01 RX ADMIN — ISOSORBIDE MONONITRATE 30 MG: 30 TABLET ORAL at 08:52

## 2020-01-01 RX ADMIN — DEXAMETHASONE 2 MG: 4 TABLET ORAL at 20:00

## 2020-01-01 RX ADMIN — SODIUM CHLORIDE 100 ML/HR: 9 INJECTION, SOLUTION INTRAVENOUS at 19:20

## 2020-01-01 RX ADMIN — LISINOPRIL 40 MG: 40 TABLET ORAL at 07:47

## 2020-01-01 RX ADMIN — ISOSORBIDE MONONITRATE 30 MG: 30 TABLET ORAL at 07:36

## 2020-01-01 RX ADMIN — ACETAMINOPHEN 500 MG: 500 TABLET, FILM COATED ORAL at 08:45

## 2020-01-01 RX ADMIN — LEVETIRACETAM 500 MG: 500 TABLET, FILM COATED ORAL at 07:25

## 2020-01-01 RX ADMIN — SENNOSIDES 1 TABLET: 8.6 TABLET, FILM COATED ORAL at 03:06

## 2020-01-01 RX ADMIN — ACETAMINOPHEN 500 MG: 500 TABLET, FILM COATED ORAL at 23:15

## 2020-01-01 RX ADMIN — FAMOTIDINE 20 MG: 20 TABLET, FILM COATED ORAL at 07:47

## 2020-01-01 RX ADMIN — LISINOPRIL 40 MG: 40 TABLET ORAL at 09:33

## 2020-01-01 RX ADMIN — LISINOPRIL 40 MG: 40 TABLET ORAL at 08:52

## 2020-01-01 RX ADMIN — FAMOTIDINE 20 MG: 20 TABLET, FILM COATED ORAL at 08:51

## 2020-01-01 RX ADMIN — DOCUSATE SODIUM 100 MG: 50 LIQUID ORAL at 21:25

## 2020-01-01 RX ADMIN — MULTIPLE VITAMINS W/ MINERALS TAB 1 TABLET: TAB at 07:52

## 2020-01-01 RX ADMIN — DOCUSATE SODIUM 100 MG: 50 LIQUID ORAL at 22:11

## 2020-01-01 RX ADMIN — DEXAMETHASONE 2 MG: 4 TABLET ORAL at 19:47

## 2020-01-01 RX ADMIN — CLONAZEPAM 0.25 MG: 0.5 TABLET ORAL at 03:48

## 2020-01-01 RX ADMIN — SODIUM CHLORIDE, POTASSIUM CHLORIDE, SODIUM LACTATE AND CALCIUM CHLORIDE 9 ML/HR: 600; 310; 30; 20 INJECTION, SOLUTION INTRAVENOUS at 12:12

## 2020-01-01 RX ADMIN — DOCUSATE SODIUM 100 MG: 50 LIQUID ORAL at 08:44

## 2020-01-01 RX ADMIN — DOCUSATE SODIUM 100 MG: 50 LIQUID ORAL at 07:51

## 2020-01-01 RX ADMIN — MULTIPLE VITAMINS W/ MINERALS TAB 1 TABLET: TAB at 07:26

## 2020-01-01 RX ADMIN — DEXAMETHASONE 2 MG: 4 TABLET ORAL at 09:34

## 2020-01-01 RX ADMIN — CEPHALEXIN 500 MG: 500 CAPSULE ORAL at 22:32

## 2020-01-01 RX ADMIN — ONDANSETRON HYDROCHLORIDE 4 MG: 2 SOLUTION INTRAMUSCULAR; INTRAVENOUS at 13:49

## 2020-01-01 RX ADMIN — LEVETIRACETAM 500 MG: 500 TABLET, FILM COATED ORAL at 07:49

## 2020-01-01 RX ADMIN — CEPHALEXIN 500 MG: 500 CAPSULE ORAL at 07:36

## 2020-01-01 RX ADMIN — Medication 2000 UNITS: at 09:28

## 2020-01-01 RX ADMIN — MULTIPLE VITAMINS W/ MINERALS TAB 1 TABLET: TAB at 08:52

## 2020-01-01 RX ADMIN — PROPOFOL 150 MG: 10 INJECTION, EMULSION INTRAVENOUS at 13:27

## 2020-01-01 RX ADMIN — ISOSORBIDE MONONITRATE 30 MG: 30 TABLET ORAL at 09:33

## 2020-01-01 RX ADMIN — DEXAMETHASONE 4 MG: 4 TABLET ORAL at 20:30

## 2020-01-01 RX ADMIN — LEVOFLOXACIN 250 MG: 250 TABLET, FILM COATED ORAL at 17:03

## 2020-01-01 RX ADMIN — ATORVASTATIN CALCIUM 20 MG: 20 TABLET, FILM COATED ORAL at 09:39

## 2020-01-01 RX ADMIN — FLUTICASONE PROPIONATE 1 SPRAY: 50 SPRAY, METERED NASAL at 08:44

## 2020-01-01 RX ADMIN — AMLODIPINE BESYLATE 10 MG: 10 TABLET ORAL at 07:52

## 2020-01-01 RX ADMIN — DOCUSATE SODIUM 100 MG: 50 LIQUID ORAL at 11:25

## 2020-01-01 RX ADMIN — SODIUM CHLORIDE 100 ML/HR: 9 INJECTION, SOLUTION INTRAVENOUS at 05:45

## 2020-01-01 RX ADMIN — CEPHALEXIN 500 MG: 500 CAPSULE ORAL at 07:52

## 2020-01-01 RX ADMIN — DEXAMETHASONE 2 MG: 4 TABLET ORAL at 07:51

## 2020-01-01 RX ADMIN — DOCUSATE SODIUM 100 MG: 100 CAPSULE, LIQUID FILLED ORAL at 20:26

## 2020-01-01 RX ADMIN — CEPHALEXIN 500 MG: 500 CAPSULE ORAL at 22:11

## 2020-01-01 RX ADMIN — LEVETIRACETAM 500 MG: 500 TABLET, FILM COATED ORAL at 19:48

## 2020-01-01 RX ADMIN — AMLODIPINE BESYLATE 10 MG: 10 TABLET ORAL at 07:26

## 2020-01-01 RX ADMIN — TRAZODONE HYDROCHLORIDE 50 MG: 50 TABLET ORAL at 20:46

## 2020-01-01 RX ADMIN — DEXAMETHASONE 4 MG: 4 TABLET ORAL at 19:48

## 2020-01-01 RX ADMIN — FAMOTIDINE 20 MG: 20 TABLET, FILM COATED ORAL at 08:44

## 2020-01-01 RX ADMIN — ISOSORBIDE MONONITRATE 30 MG: 30 TABLET ORAL at 08:44

## 2020-01-01 RX ADMIN — DOCUSATE SODIUM 100 MG: 50 LIQUID ORAL at 19:47

## 2020-01-01 RX ADMIN — TRAZODONE HYDROCHLORIDE 50 MG: 50 TABLET ORAL at 20:14

## 2020-01-01 RX ADMIN — AMLODIPINE BESYLATE 10 MG: 10 TABLET ORAL at 09:33

## 2020-01-01 RX ADMIN — TRAZODONE HYDROCHLORIDE 50 MG: 50 TABLET ORAL at 19:55

## 2020-01-01 RX ADMIN — CEPHALEXIN 500 MG: 500 CAPSULE ORAL at 07:24

## 2020-01-01 RX ADMIN — CEPHALEXIN 500 MG: 500 CAPSULE ORAL at 08:44

## 2020-01-01 RX ADMIN — ISOSORBIDE MONONITRATE 30 MG: 30 TABLET ORAL at 09:39

## 2020-01-01 RX ADMIN — ATORVASTATIN CALCIUM 20 MG: 20 TABLET, FILM COATED ORAL at 09:29

## 2020-01-01 RX ADMIN — ISOSORBIDE MONONITRATE 30 MG: 30 TABLET ORAL at 09:29

## 2020-01-01 RX ADMIN — FLUTICASONE PROPIONATE 1 SPRAY: 50 SPRAY, METERED NASAL at 11:27

## 2020-01-01 RX ADMIN — TRAZODONE HYDROCHLORIDE 50 MG: 50 TABLET ORAL at 22:27

## 2020-01-01 RX ADMIN — DEXAMETHASONE 4 MG: 4 TABLET ORAL at 07:25

## 2020-01-01 RX ADMIN — ACETAMINOPHEN 500 MG: 500 TABLET, FILM COATED ORAL at 06:30

## 2020-01-01 RX ADMIN — LEVETIRACETAM 500 MG: 500 TABLET, FILM COATED ORAL at 11:23

## 2020-01-01 RX ADMIN — FAMOTIDINE 20 MG: 20 TABLET, FILM COATED ORAL at 07:23

## 2020-01-01 RX ADMIN — FAMOTIDINE 20 MG: 10 INJECTION, SOLUTION INTRAVENOUS at 12:12

## 2020-01-01 RX ADMIN — Medication 2000 UNITS: at 07:47

## 2020-01-01 RX ADMIN — DEXAMETHASONE 4 MG: 4 TABLET ORAL at 07:49

## 2020-01-01 RX ADMIN — DEXAMETHASONE 2 MG: 4 TABLET ORAL at 09:29

## 2020-01-01 RX ADMIN — ISOSORBIDE MONONITRATE 30 MG: 30 TABLET ORAL at 07:47

## 2020-01-01 RX ADMIN — DOCUSATE SODIUM 100 MG: 50 LIQUID ORAL at 20:46

## 2020-01-01 RX ADMIN — LEVETIRACETAM 500 MG: 500 TABLET, FILM COATED ORAL at 20:46

## 2020-01-01 RX ADMIN — MULTIPLE VITAMINS W/ MINERALS TAB 1 TABLET: TAB at 08:44

## 2020-01-01 RX ADMIN — DEXAMETHASONE 2 MG: 4 TABLET ORAL at 20:46

## 2020-01-01 RX ADMIN — DOCUSATE SODIUM 100 MG: 100 CAPSULE, LIQUID FILLED ORAL at 08:51

## 2020-01-01 RX ADMIN — DEXAMETHASONE 4 MG: 4 TABLET ORAL at 08:51

## 2020-01-01 RX ADMIN — Medication 2000 UNITS: at 09:39

## 2020-01-01 RX ADMIN — FAMOTIDINE 20 MG: 20 TABLET, FILM COATED ORAL at 07:35

## 2020-01-01 RX ADMIN — ATORVASTATIN CALCIUM 20 MG: 20 TABLET, FILM COATED ORAL at 08:44

## 2020-01-01 RX ADMIN — CEPHALEXIN 500 MG: 500 CAPSULE ORAL at 09:29

## 2020-01-01 RX ADMIN — HYDRALAZINE HYDROCHLORIDE 10 MG: 10 TABLET, FILM COATED ORAL at 06:30

## 2020-01-01 RX ADMIN — AMLODIPINE BESYLATE 10 MG: 10 TABLET ORAL at 09:39

## 2020-01-01 RX ADMIN — LEVETIRACETAM 500 MG: 500 TABLET, FILM COATED ORAL at 20:15

## 2020-01-01 RX ADMIN — DOCUSATE SODIUM 100 MG: 50 LIQUID ORAL at 07:26

## 2020-01-01 RX ADMIN — DOCUSATE SODIUM 100 MG: 100 CAPSULE, LIQUID FILLED ORAL at 20:15

## 2020-01-01 RX ADMIN — MULTIPLE VITAMINS W/ MINERALS TAB 1 TABLET: TAB at 07:37

## 2020-01-01 RX ADMIN — ACETAMINOPHEN 500 MG: 500 TABLET, FILM COATED ORAL at 11:16

## 2020-01-01 RX ADMIN — LEVETIRACETAM 500 MG: 500 TABLET, FILM COATED ORAL at 09:32

## 2020-01-01 RX ADMIN — SODIUM CHLORIDE 100 ML/HR: 9 INJECTION, SOLUTION INTRAVENOUS at 00:26

## 2020-01-01 RX ADMIN — ACETAMINOPHEN 500 MG: 500 TABLET, FILM COATED ORAL at 15:45

## 2020-01-01 RX ADMIN — LEVETIRACETAM 500 MG: 500 TABLET, FILM COATED ORAL at 21:24

## 2020-01-01 RX ADMIN — LEVETIRACETAM 500 MG: 500 TABLET, FILM COATED ORAL at 07:36

## 2020-01-01 RX ADMIN — ACETAMINOPHEN 500 MG: 500 TABLET, FILM COATED ORAL at 19:47

## 2020-01-01 RX ADMIN — ISOSORBIDE MONONITRATE 30 MG: 30 TABLET ORAL at 08:51

## 2020-01-01 RX ADMIN — OLANZAPINE 5 MG: 5 TABLET ORAL at 00:40

## 2020-01-01 RX ADMIN — CEFAZOLIN SODIUM 2 G: 2 INJECTION, SOLUTION INTRAVENOUS at 13:31

## 2020-01-01 RX ADMIN — FAMOTIDINE 20 MG: 20 TABLET, FILM COATED ORAL at 09:31

## 2020-01-01 RX ADMIN — LISINOPRIL 40 MG: 40 TABLET ORAL at 08:44

## 2020-01-01 RX ADMIN — FLUTICASONE PROPIONATE 1 SPRAY: 50 SPRAY, METERED NASAL at 07:34

## 2020-01-01 RX ADMIN — LEVETIRACETAM 500 MG: 500 TABLET, FILM COATED ORAL at 22:27

## 2020-01-01 RX ADMIN — LEVETIRACETAM 500 MG: 500 TABLET, FILM COATED ORAL at 19:47

## 2020-01-01 RX ADMIN — FAMOTIDINE 20 MG: 20 TABLET, FILM COATED ORAL at 09:32

## 2020-01-01 RX ADMIN — CEPHALEXIN 500 MG: 500 CAPSULE ORAL at 20:46

## 2020-01-01 RX ADMIN — MULTIPLE VITAMINS W/ MINERALS TAB 1 TABLET: TAB at 09:51

## 2020-01-01 RX ADMIN — DEXAMETHASONE 2 MG: 4 TABLET ORAL at 11:25

## 2020-01-01 RX ADMIN — Medication 2000 UNITS: at 08:51

## 2020-01-01 RX ADMIN — DOCUSATE SODIUM 100 MG: 50 LIQUID ORAL at 07:35

## 2020-01-01 RX ADMIN — DEXAMETHASONE 4 MG: 4 TABLET ORAL at 08:44

## 2020-01-01 RX ADMIN — FLUTICASONE PROPIONATE 1 SPRAY: 50 SPRAY, METERED NASAL at 07:58

## 2020-01-01 RX ADMIN — LABETALOL HYDROCHLORIDE 5 MG: 5 INJECTION, SOLUTION INTRAVENOUS at 15:11

## 2020-01-01 RX ADMIN — DOCUSATE SODIUM 100 MG: 100 CAPSULE, LIQUID FILLED ORAL at 20:31

## 2020-01-01 RX ADMIN — AMLODIPINE BESYLATE 10 MG: 10 TABLET ORAL at 08:51

## 2020-01-01 RX ADMIN — FLUTICASONE PROPIONATE 1 SPRAY: 50 SPRAY, METERED NASAL at 09:43

## 2020-01-01 RX ADMIN — HYDRALAZINE HYDROCHLORIDE 10 MG: 10 TABLET, FILM COATED ORAL at 22:10

## 2020-01-01 RX ADMIN — TRAZODONE HYDROCHLORIDE 50 MG: 50 TABLET ORAL at 19:48

## 2020-01-01 RX ADMIN — DEXAMETHASONE 4 MG: 4 TABLET ORAL at 07:55

## 2020-01-01 RX ADMIN — CEPHALEXIN 500 MG: 500 CAPSULE ORAL at 19:47

## 2020-01-01 RX ADMIN — MULTIPLE VITAMINS W/ MINERALS TAB 1 TABLET: TAB at 07:48

## 2020-01-01 RX ADMIN — AMLODIPINE BESYLATE 10 MG: 10 TABLET ORAL at 08:44

## 2020-01-01 RX ADMIN — FAMOTIDINE 20 MG: 20 TABLET, FILM COATED ORAL at 07:53

## 2020-01-01 RX ADMIN — DOCUSATE SODIUM 100 MG: 50 LIQUID ORAL at 22:32

## 2020-01-01 RX ADMIN — TRAZODONE HYDROCHLORIDE 50 MG: 50 TABLET ORAL at 19:47

## 2020-01-01 RX ADMIN — LEVETIRACETAM 500 MG: 500 TABLET, FILM COATED ORAL at 08:51

## 2020-01-01 RX ADMIN — DEXAMETHASONE 2 MG: 4 TABLET ORAL at 19:46

## 2020-01-01 RX ADMIN — DOCUSATE SODIUM 100 MG: 100 CAPSULE, LIQUID FILLED ORAL at 19:47

## 2020-01-01 RX ADMIN — FAMOTIDINE 20 MG: 20 TABLET, FILM COATED ORAL at 09:29

## 2020-01-01 RX ADMIN — CEPHALEXIN 500 MG: 500 CAPSULE ORAL at 19:56

## 2020-01-01 RX ADMIN — POTASSIUM CHLORIDE, DEXTROSE MONOHYDRATE AND SODIUM CHLORIDE 75 ML/HR: 150; 5; 900 INJECTION, SOLUTION INTRAVENOUS at 19:45

## 2020-01-01 RX ADMIN — LISINOPRIL 40 MG: 40 TABLET ORAL at 07:54

## 2020-01-01 RX ADMIN — LEVETIRACETAM 500 MG: 500 TABLET, FILM COATED ORAL at 20:00

## 2020-01-01 RX ADMIN — LISINOPRIL 40 MG: 40 TABLET ORAL at 11:24

## 2020-01-01 RX ADMIN — FLUTICASONE PROPIONATE 1 SPRAY: 50 SPRAY, METERED NASAL at 07:59

## 2020-01-01 RX ADMIN — ATORVASTATIN CALCIUM 20 MG: 20 TABLET, FILM COATED ORAL at 08:51

## 2020-01-01 RX ADMIN — ATORVASTATIN CALCIUM 20 MG: 20 TABLET, FILM COATED ORAL at 09:26

## 2020-01-01 RX ADMIN — LEVOFLOXACIN 250 MG: 250 TABLET, FILM COATED ORAL at 17:35

## 2020-01-01 RX ADMIN — TRAZODONE HYDROCHLORIDE 50 MG: 50 TABLET ORAL at 22:32

## 2020-01-01 RX ADMIN — DEXAMETHASONE 4 MG: 4 TABLET ORAL at 22:32

## 2020-01-01 RX ADMIN — LEVETIRACETAM 500 MG: 500 TABLET, FILM COATED ORAL at 08:44

## 2020-01-01 RX ADMIN — DOCUSATE SODIUM 100 MG: 50 LIQUID ORAL at 09:29

## 2020-01-01 RX ADMIN — ISOSORBIDE MONONITRATE 30 MG: 30 TABLET ORAL at 07:25

## 2020-01-01 RX ADMIN — DOCUSATE SODIUM 100 MG: 50 LIQUID ORAL at 19:46

## 2020-01-01 RX ADMIN — LISINOPRIL 40 MG: 40 TABLET ORAL at 07:26

## 2020-01-01 RX ADMIN — LEVETIRACETAM 500 MG: 500 TABLET, FILM COATED ORAL at 09:43

## 2020-01-01 RX ADMIN — DEXAMETHASONE 2 MG: 4 TABLET ORAL at 19:55

## 2020-01-01 RX ADMIN — CEPHALEXIN 500 MG: 500 CAPSULE ORAL at 20:00

## 2020-01-01 RX ADMIN — LEVETIRACETAM 500 MG: 500 TABLET, FILM COATED ORAL at 22:10

## 2020-01-01 RX ADMIN — SODIUM CHLORIDE 100 ML/HR: 9 INJECTION, SOLUTION INTRAVENOUS at 12:08

## 2020-01-01 RX ADMIN — MULTIPLE VITAMINS W/ MINERALS TAB 1 TABLET: TAB at 07:54

## 2020-01-01 RX ADMIN — ATORVASTATIN CALCIUM 20 MG: 20 TABLET, FILM COATED ORAL at 07:48

## 2020-01-01 RX ADMIN — ACETAMINOPHEN 500 MG: 500 TABLET, FILM COATED ORAL at 15:43

## 2020-01-01 RX ADMIN — ATORVASTATIN CALCIUM 20 MG: 20 TABLET, FILM COATED ORAL at 11:23

## 2020-01-01 RX ADMIN — ACETAMINOPHEN 500 MG: 500 TABLET, FILM COATED ORAL at 07:09

## 2020-01-01 RX ADMIN — DOCUSATE SODIUM 100 MG: 100 CAPSULE, LIQUID FILLED ORAL at 07:54

## 2020-01-01 RX ADMIN — DOCUSATE SODIUM 100 MG: 100 CAPSULE, LIQUID FILLED ORAL at 22:11

## 2020-01-01 RX ADMIN — FLUTICASONE PROPIONATE 1 SPRAY: 50 SPRAY, METERED NASAL at 08:57

## 2020-01-01 RX ADMIN — LISINOPRIL 40 MG: 40 TABLET ORAL at 07:53

## 2020-01-01 RX ADMIN — LEVETIRACETAM 500 MG: 500 TABLET, FILM COATED ORAL at 07:55

## 2020-01-01 RX ADMIN — ISOSORBIDE MONONITRATE 30 MG: 30 TABLET ORAL at 07:52

## 2020-01-01 RX ADMIN — FAMOTIDINE 20 MG: 20 TABLET, FILM COATED ORAL at 09:43

## 2020-01-01 RX ADMIN — ACETAMINOPHEN 500 MG: 500 TABLET, FILM COATED ORAL at 12:34

## 2020-01-01 RX ADMIN — Medication 2000 UNITS: at 07:25

## 2020-01-01 RX ADMIN — DEXAMETHASONE 4 MG: 4 TABLET ORAL at 20:26

## 2020-01-01 RX ADMIN — TRAZODONE HYDROCHLORIDE 50 MG: 50 TABLET ORAL at 22:11

## 2020-01-01 RX ADMIN — FLUTICASONE PROPIONATE 1 SPRAY: 50 SPRAY, METERED NASAL at 08:51

## 2020-01-01 RX ADMIN — MULTIPLE VITAMINS W/ MINERALS TAB 1 TABLET: TAB at 09:38

## 2020-01-01 RX ADMIN — Medication 2000 UNITS: at 07:53

## 2020-01-01 RX ADMIN — LEVETIRACETAM 500 MG: 500 TABLET, FILM COATED ORAL at 08:52

## 2020-01-01 RX ADMIN — TRAZODONE HYDROCHLORIDE 50 MG: 50 TABLET ORAL at 20:26

## 2020-01-01 RX ADMIN — DOCUSATE SODIUM 100 MG: 50 LIQUID ORAL at 09:34

## 2020-01-01 RX ADMIN — TRAZODONE HYDROCHLORIDE 50 MG: 50 TABLET ORAL at 21:25

## 2020-01-01 RX ADMIN — LISINOPRIL 40 MG: 40 TABLET ORAL at 08:51

## 2020-01-01 RX ADMIN — LEVETIRACETAM 500 MG: 500 TABLET, FILM COATED ORAL at 19:56

## 2020-01-01 RX ADMIN — DEXAMETHASONE 4 MG: 4 TABLET ORAL at 20:14

## 2020-01-01 RX ADMIN — OLANZAPINE 5 MG: 5 TABLET ORAL at 23:27

## 2020-01-01 RX ADMIN — LISINOPRIL 40 MG: 40 TABLET ORAL at 07:09

## 2020-01-01 RX ADMIN — CEPHALEXIN 500 MG: 500 CAPSULE ORAL at 21:24

## 2020-01-01 RX ADMIN — DEXAMETHASONE 2 MG: 4 TABLET ORAL at 21:25

## 2020-01-01 RX ADMIN — LEVETIRACETAM 500 MG: 500 TABLET, FILM COATED ORAL at 20:26

## 2020-01-01 RX ADMIN — DOCUSATE SODIUM 100 MG: 100 CAPSULE, LIQUID FILLED ORAL at 22:27

## 2020-01-01 RX ADMIN — MULTIPLE VITAMINS W/ MINERALS TAB 1 TABLET: TAB at 08:51

## 2020-01-01 RX ADMIN — TRAZODONE HYDROCHLORIDE 50 MG: 50 TABLET ORAL at 20:00

## 2020-01-01 RX ADMIN — DEXAMETHASONE 4 MG: 4 TABLET ORAL at 22:27

## 2020-01-01 RX ADMIN — LISINOPRIL 40 MG: 40 TABLET ORAL at 09:32

## 2020-01-01 RX ADMIN — PROPOFOL 200 MCG/KG/MIN: 10 INJECTION, EMULSION INTRAVENOUS at 13:31

## 2020-01-01 RX ADMIN — SODIUM CHLORIDE 100 ML/HR: 9 INJECTION, SOLUTION INTRAVENOUS at 16:31

## 2020-01-01 RX ADMIN — FLUTICASONE PROPIONATE 1 SPRAY: 50 SPRAY, METERED NASAL at 09:33

## 2020-01-01 RX ADMIN — AMLODIPINE BESYLATE 10 MG: 10 TABLET ORAL at 11:24

## 2020-01-01 RX ADMIN — LISINOPRIL 40 MG: 40 TABLET ORAL at 07:24

## 2020-01-01 RX ADMIN — LEVETIRACETAM 500 MG: 500 TABLET, FILM COATED ORAL at 19:45

## 2020-01-01 RX ADMIN — FAMOTIDINE 20 MG: 20 TABLET, FILM COATED ORAL at 07:56

## 2020-01-01 RX ADMIN — HYDRALAZINE HYDROCHLORIDE 10 MG: 10 TABLET, FILM COATED ORAL at 05:49

## 2020-01-01 RX ADMIN — DOCUSATE SODIUM 100 MG: 100 CAPSULE, LIQUID FILLED ORAL at 07:50

## 2020-01-01 RX ADMIN — MULTIPLE VITAMINS W/ MINERALS TAB 1 TABLET: TAB at 09:32

## 2020-01-01 RX ADMIN — LEVOFLOXACIN 250 MG: 250 TABLET, FILM COATED ORAL at 16:39

## 2020-01-01 RX ADMIN — Medication 2000 UNITS: at 07:55

## 2020-01-01 RX ADMIN — Medication 2000 UNITS: at 07:35

## 2020-01-01 RX ADMIN — Medication 2000 UNITS: at 09:31

## 2020-01-01 RX ADMIN — AMLODIPINE BESYLATE 10 MG: 10 TABLET ORAL at 07:09

## 2020-01-01 RX ADMIN — AMLODIPINE BESYLATE 10 MG: 10 TABLET ORAL at 07:48

## 2020-01-01 RX ADMIN — LISINOPRIL 40 MG: 40 TABLET ORAL at 05:54

## 2020-01-01 RX ADMIN — FLUTICASONE PROPIONATE 1 SPRAY: 50 SPRAY, METERED NASAL at 09:31

## 2020-01-01 RX ADMIN — Medication 2000 UNITS: at 08:44

## 2020-01-01 RX ADMIN — DEXAMETHASONE 2 MG: 4 TABLET ORAL at 07:37

## 2020-01-01 RX ADMIN — TRAZODONE HYDROCHLORIDE 50 MG: 50 TABLET ORAL at 22:10

## 2020-01-01 RX ADMIN — DEXAMETHASONE 2 MG: 4 TABLET ORAL at 08:45

## 2020-01-01 RX ADMIN — ACETAMINOPHEN 500 MG: 500 TABLET, FILM COATED ORAL at 16:39

## 2020-01-01 RX ADMIN — LEVETIRACETAM 500 MG: 500 TABLET, FILM COATED ORAL at 07:52

## 2020-01-01 RX ADMIN — MULTIPLE VITAMINS W/ MINERALS TAB 1 TABLET: TAB at 07:25

## 2020-01-01 RX ADMIN — ISOSORBIDE MONONITRATE 30 MG: 30 TABLET ORAL at 07:55

## 2020-01-01 RX ADMIN — CEPHALEXIN 500 MG: 500 CAPSULE ORAL at 09:33

## 2020-01-01 RX ADMIN — SODIUM CHLORIDE 100 ML/HR: 9 INJECTION, SOLUTION INTRAVENOUS at 12:19

## 2020-01-01 RX ADMIN — LEVETIRACETAM 500 MG: 500 TABLET, FILM COATED ORAL at 22:32

## 2020-01-01 RX ADMIN — CEPHALEXIN 500 MG: 500 CAPSULE ORAL at 11:24

## 2020-01-01 RX ADMIN — ACETAMINOPHEN 500 MG: 500 TABLET, FILM COATED ORAL at 14:23

## 2020-01-01 RX ADMIN — DOCUSATE SODIUM 100 MG: 50 LIQUID ORAL at 19:56

## 2020-01-01 RX ADMIN — DOCUSATE SODIUM 100 MG: 100 CAPSULE, LIQUID FILLED ORAL at 09:38

## 2020-01-01 RX ADMIN — ACETAMINOPHEN 500 MG: 500 TABLET, FILM COATED ORAL at 20:26

## 2020-01-01 RX ADMIN — FENTANYL CITRATE 50 MCG: 50 INJECTION INTRAMUSCULAR; INTRAVENOUS at 13:22

## 2020-01-01 RX ADMIN — ATORVASTATIN CALCIUM 20 MG: 20 TABLET, FILM COATED ORAL at 07:34

## 2020-01-01 RX ADMIN — ATORVASTATIN CALCIUM 20 MG: 20 TABLET, FILM COATED ORAL at 07:53

## 2020-01-01 RX ADMIN — SODIUM CHLORIDE 100 ML/HR: 9 INJECTION, SOLUTION INTRAVENOUS at 02:28

## 2020-01-01 RX ADMIN — HYDRALAZINE HYDROCHLORIDE 10 MG: 10 TABLET, FILM COATED ORAL at 06:28

## 2020-01-01 RX ADMIN — DOCUSATE SODIUM 100 MG: 50 LIQUID ORAL at 20:00

## 2020-01-01 RX ADMIN — CLONAZEPAM 0.25 MG: 0.5 TABLET ORAL at 17:49

## 2020-01-01 RX ADMIN — AMLODIPINE BESYLATE 10 MG: 10 TABLET ORAL at 07:25

## 2020-01-01 RX ADMIN — FLUTICASONE PROPIONATE 1 SPRAY: 50 SPRAY, METERED NASAL at 07:56

## 2020-01-01 RX ADMIN — ATORVASTATIN CALCIUM 20 MG: 20 TABLET, FILM COATED ORAL at 07:26

## 2020-01-01 RX ADMIN — DEXAMETHASONE 4 MG: 4 TABLET ORAL at 09:43

## 2020-01-01 RX ADMIN — ACETAMINOPHEN 500 MG: 500 TABLET, FILM COATED ORAL at 11:40

## 2020-01-01 RX ADMIN — LEVETIRACETAM 500 MG: 500 TABLET, FILM COATED ORAL at 22:11

## 2020-01-01 RX ADMIN — AMLODIPINE BESYLATE 10 MG: 10 TABLET ORAL at 07:54

## 2020-01-01 RX ADMIN — FLUTICASONE PROPIONATE 1 SPRAY: 50 SPRAY, METERED NASAL at 09:24

## 2020-01-01 RX ADMIN — ISOSORBIDE MONONITRATE 30 MG: 30 TABLET ORAL at 11:24

## 2020-01-01 RX ADMIN — AMLODIPINE BESYLATE 10 MG: 10 TABLET ORAL at 09:31

## 2020-01-01 RX ADMIN — LEVETIRACETAM 500 MG: 500 TABLET, FILM COATED ORAL at 09:29

## 2020-01-01 RX ADMIN — ATORVASTATIN CALCIUM 20 MG: 20 TABLET, FILM COATED ORAL at 08:02

## 2020-01-01 RX ADMIN — DEXAMETHASONE 2 MG: 4 TABLET ORAL at 22:11

## 2020-01-01 RX ADMIN — DEXAMETHASONE 4 MG: 4 TABLET ORAL at 22:10

## 2020-01-01 RX ADMIN — ISOSORBIDE MONONITRATE 30 MG: 30 TABLET ORAL at 07:24

## 2020-02-10 ENCOUNTER — TELEPHONE (OUTPATIENT)
Dept: INTERNAL MEDICINE | Facility: CLINIC | Age: 85
End: 2020-02-10

## 2020-02-10 RX ORDER — AMLODIPINE BESYLATE 5 MG/1
5 TABLET ORAL DAILY
Qty: 90 TABLET | Refills: 0 | Status: SHIPPED | OUTPATIENT
Start: 2020-02-10 | End: 2020-01-01

## 2020-02-10 NOTE — TELEPHONE ENCOUNTER
PT WIFE CALLED IN SAYS THAT MR DOUGLAS WAS IN HOSPITAL AND GIVEN RX ON 1/18/2020 BY [facility]. DR LYNSEY DAVIS.     BP RUNNING 140/78 SINCE TAKING THE MEDICINE. WAS .. PT HAD A PORT PUT IN AND WAS GIVEN:     PT NEEDS NEW RX WRITTEN FOR     AMOLODIPINE 5MG - TAKE ONE TABLET BY MOUTH DAILY     MARCIE Guillen - - CONFIRMED    PT CALLBACK [phone]

## 2020-02-13 ENCOUNTER — TELEPHONE (OUTPATIENT)
Dept: INTERNAL MEDICINE | Facility: CLINIC | Age: 85
End: 2020-02-13

## 2020-02-13 NOTE — TELEPHONE ENCOUNTER
Pt's wife said this pain is from when he had the shingles, and he would like something for long term. The only thing that works for him is the hydrocodone, gabapentin swells his feet up, AND LIDOCAIN does nothing at all.

## 2020-02-13 NOTE — TELEPHONE ENCOUNTER
I'm not sure why someone told him that I'm not his nurse practitioner, please reassure his wife that he is still my patient. Please inquire who was writing for hydrocodone for him before. Is this for short-term?

## 2020-02-13 NOTE — TELEPHONE ENCOUNTER
Pt's wife is calling  Asking about the prescription for hydrocodone. Pt's wife states PHN pain from the shingles. Pt's wife  States the home health nurse tried to call the office and was told Vanita Westbrook wasn't his APRN so pt's wife is confused. She would like to speak to the nurse.    Please advise and give call [phone] ([phone]    Confirmed evie

## 2020-02-14 NOTE — TELEPHONE ENCOUNTER
Can you ask his wife who was prescribing it before? I'm not comfortable with long-term hydrocodone, as it has medication interactions and can cause sedation and increase falls risk which we definitely do not want to cause.

## 2020-02-21 NOTE — PROGRESS NOTES
The ABCs of the Annual Wellness Visit  Subsequent Medicare Wellness Visit    Chief Complaint   Patient presents with   • Medicare Wellness-subsequent     Pt presents here today for a medicare wellness visit.       Subjective   History of Present Illness:  Silas Haines is a 84 y.o. male who presents for a Subsequent Medicare Wellness Visit.    HEALTH RISK ASSESSMENT    Recent Hospitalizations:  Recently treated at the following:  [facility]    Current Medical Providers:  Patient Care Team:  Vanita Westbrook APRN as PCP - General (Nurse Practitioner)  Manolo Ruiz Sr., MD (Inactive) (Dermatology)  Brandyn Andersen MD as Consulting Physician (Medical Oncology)  Louis Jules MD as Consulting Physician (Urology)  Neri Devine MD as Consulting Physician (Interventional Cardiology)  Jung, Jae Yeon, MD (Dermatology)  Lissette Edwards PA as Physician Assistant (Oncology)  Antonio Duran MD as Consulting Physician (Pain Medicine)    Smoking Status:  Social History     Tobacco Use   Smoking Status Never Smoker   Smokeless Tobacco Never Used       Alcohol Consumption:  Social History     Substance and Sexual Activity   Alcohol Use No   • Frequency: Never       Depression Screen:   PHQ-2/PHQ-9 Depression Screening 2/21/2020   Little interest or pleasure in doing things 0   Feeling down, depressed, or hopeless 0   Total Score 0       Fall Risk Screen:  STEADI Fall Risk Assessment has not been completed.    Health Habits and Functional and Cognitive Screening:  Functional & Cognitive Status 2/21/2020   Do you have difficulty preparing food and eating? No   Do you have difficulty bathing yourself, getting dressed or grooming yourself? No   Do you have difficulty using the toilet? No   Do you have difficulty moving around from place to place? Yes   Do you have trouble with steps or getting out of a bed or a chair? No   Current Diet Well Balanced Diet   Dental Exam Up to date    Eye Exam Up to date   Exercise (times per week) 2 times per week   Current Exercise Activities Include Walking   Do you need help using the phone?  Yes   Are you deaf or do you have serious difficulty hearing?  Yes   Do you need help with transportation? Yes   Do you need help shopping? No   Do you need help preparing meals?  No   Do you need help with housework?  Yes   Do you need help with laundry? Yes   Do you need help taking your medications? No   Do you need help managing money? No   Do you ever drive or ride in a car without wearing a seat belt? No   Have you felt unusual stress, anger or loneliness in the last month? No   Who do you live with? Spouse   If you need help, do you have trouble finding someone available to you? No   Have you been bothered in the last four weeks by sexual problems? No   Do you have difficulty concentrating, remembering or making decisions? Yes         Does the patient have evidence of cognitive impairment? No    Asprin use counseling:Does not need ASA (and currently is not on it)    Age-appropriate Screening Schedule:  Refer to the list below for future screening recommendations based on patient's age, sex and/or medical conditions. Orders for these recommended tests are listed in the plan section. The patient has been provided with a written plan.    Health Maintenance   Topic Date Due   • TDAP/TD VACCINES (1 - Tdap) 05/16/1947   • ZOSTER VACCINE (1 of 2) 05/16/1986   • DXA SCAN  11/16/2017   • LIPID PANEL  08/08/2019   • INFLUENZA VACCINE  Completed          The following portions of the patient's history were reviewed and updated as appropriate: allergies, current medications, past family history, past medical history, past social history, past surgical history and problem list.    Outpatient Medications Prior to Visit   Medication Sig Dispense Refill   • acetaminophen (TYLENOL) 500 MG tablet Take 500 mg by mouth Every 6 (Six) Hours As Needed for Mild Pain .     • amLODIPine  (NORVASC) 5 MG tablet Take 1 tablet by mouth Daily. 90 tablet 0   • atorvastatin (LIPITOR) 20 MG tablet TAKE ONE TABLET BY MOUTH DAILY 90 tablet 2   • benzonatate (TESSALON PERLES) 100 MG capsule Take 1 capsule by mouth 3 (Three) Times a Day As Needed for Cough. 42 capsule 0   • CBD (cannabidiol) oral oil Place 60 mL under the tongue.     • dexamethasone (DECADRON) 1 MG tablet Take 0.5 mg by mouth Daily With Breakfast.     • famotidine (PEPCID) 20 MG tablet Take 20 mg by mouth 2 (Two) Times a Day.     • fluticasone (FLONASE) 50 MCG/ACT nasal spray 2 sprays into the nostril(s) as directed by provider Daily. 9.9 mL 2   • isosorbide mononitrate (IMDUR) 30 MG 24 hr tablet Take 30 mg by mouth Every Evening.     • levETIRAcetam (KEPPRA) 500 MG tablet Take 500 mg by mouth 2 (Two) Times a Day.     • lisinopril (PRINIVIL,ZESTRIL) 10 MG tablet      • Multiple Vitamin (MULTI VITAMIN MENS) tablet Take 1 tablet by mouth daily.     • senna (SENOKOT) 8.6 MG tablet tablet Take 1 tablet by mouth Daily As Needed for Constipation.     • Vitamin D, Ergocalciferol, 2000 units capsule Take 1 capsule by mouth Daily.     • CBD (cannabidiol) oral oil Place 1 mg under the tongue Daily.     • Bevacizumab (AVASTIN IV) Infuse  into a venous catheter Every 14 (Fourteen) Days.       No facility-administered medications prior to visit.        Patient Active Problem List   Diagnosis   • Chronic coronary artery disease   • Hypertension   • Hypercholesterolemia   • Multiple myeloma (CMS/HCC)   • Postherpetic neuralgia   • Shingles (herpes zoster) polyneuropathy   • Herpes zoster   • Squamous cell carcinoma   • Thoracic root lesion   • Squamous cell carcinoma of skin of scalp   • Cold intolerance   • Impacted cerumen of right ear   • Edema of extremities   • Healthcare-associated pneumonia   • Personal history of other diseases of the circulatory system   • Normocytic normochromic anemia   • Inflammatory pseudotumor of orbit   • Periorbital cellulitis  "  • Malignant neoplasm of prostate (CMS/HCC)   • Left inguinal hernia   • Glioblastoma multiforme (CMS/HCC)/ left side   • Word finding difficulty   • Gross hematuria   • Bladder tumor   • Flu vaccine need       Advanced Care Planning:  ACP discussion was held with the patient during this visit. Patient has an advance directive that is valid in EMR.     Review of Systems   Constitutional: Negative for activity change, chills, fatigue, fever and unexpected weight change.   HENT: Negative for congestion, hearing loss, postnasal drip, sinus pressure, sinus pain, sneezing, sore throat and tinnitus.    Eyes: Negative for photophobia, pain and visual disturbance.   Respiratory: Negative for cough, chest tightness, shortness of breath and wheezing.    Cardiovascular: Negative for chest pain, palpitations and leg swelling.   Gastrointestinal: Negative for abdominal distention, abdominal pain, constipation, diarrhea, nausea and vomiting.   Endocrine: Negative for polydipsia, polyphagia and polyuria.   Genitourinary: Negative for dysuria, frequency, hematuria and urgency.   Musculoskeletal: Positive for arthralgias (  Left side/back pain from PHN).   Neurological: Negative for dizziness, weakness, numbness and headaches.   All other systems reviewed and are negative.      Compared to one year ago, the patient feels his physical health is the same.  Compared to one year ago, the patient feels his mental health is the same.    Reviewed chart for potential of high risk medication in the elderly: yes  Reviewed chart for potential of harmful drug interactions in the elderly:yes    Objective         Vitals:    02/21/20 1244   BP: 132/81   BP Location: Left arm   Patient Position: Sitting   Cuff Size: Adult   Pulse: 68   Resp: 16   Temp: 97.9 °F (36.6 °C)   TempSrc: Oral   SpO2: 98%   Weight: 68 kg (150 lb)   Height: 182.9 cm (72\")       Body mass index is 20.34 kg/m².  Discussed the patient's BMI with him. The BMI is in the " acceptable range.    Physical Exam   Constitutional: He is oriented to person, place, and time. He appears well-developed and well-nourished. No distress.   HENT:   Head: Normocephalic and atraumatic.   Eyes: Pupils are equal, round, and reactive to light. EOM are normal.   Neck: Normal range of motion. Neck supple.   Cardiovascular: Normal rate, regular rhythm, normal heart sounds and intact distal pulses. Exam reveals no gallop and no friction rub.   No murmur heard.  No peripheral pitting edema   Pulmonary/Chest: Effort normal and breath sounds normal. No stridor. No respiratory distress. He has no wheezes. He has no rales. He exhibits no tenderness.   Lungs are CTA bilat   Abdominal: Soft. Bowel sounds are normal. He exhibits no distension. There is no tenderness.   Musculoskeletal: Normal range of motion.   Neurological: He is alert and oriented to person, place, and time.   Skin: Skin is warm and dry. Capillary refill takes less than 2 seconds. He is not diaphoretic.   Psychiatric: He has a normal mood and affect. His behavior is normal. Judgment and thought content normal.   Nursing note and vitals reviewed.      Lab Results   Component Value Date    GLU 95 02/03/2020        Assessment/Plan   Medicare Risks and Personalized Health Plan  CMS Preventative Services Quick Reference  Advance Directive Discussion  Cardiovascular risk  Dementia/Memory   Depression/Dysphoria  Fall Risk  Immunizations Discussed/Encouraged (specific immunizations; Influenza and Shingrix )    The above risks/problems have been discussed with the patient.  Pertinent information has been shared with the patient in the After Visit Summary.  Follow up plans and orders are seen below in the Assessment/Plan Section.    Diagnoses and all orders for this visit:    1. Medicare annual wellness visit, subsequent (Primary)    2. Skin lesion    3. History of skin cancer    4. Essential hypertension    5. Hypercholesterolemia    6. Postherpetic  neuralgia      Follow Up:  Return in about 6 months (around 8/21/2020).     An After Visit Summary and PPPS were given to the patient.     -Annual Medicare wellness visit performed.  He has had a flu shot for this season.  Anemia being followed closely by oncology, Dr. Rai.    -Skin lesion: Right shoulder, posterior, dry skin lesion, superficial.  Looks relatively benign but due to the fact that it has appeared quickly and due to his history of squamous cell carcinoma I recommended close follow-up with his dermatologist, Dr. Santos.  He has an appointment in mid April 2020 for follow-up and possible excision.    -Hypertension: Well controlled today at 132/81.  Continue amlodipine 5 mg daily and lisinopril 10 mg daily.    -Hyperlipidemia: Continue atorvastatin 20 mg daily.  We will check a lipid panel at his next follow-up.    -Postherpetic neuralgia: Discussed at length with patient and his wife.  He has tried gabapentin in the past which produced leg swelling.  He has tried Lyrica in the past which was not effective.  Lidocaine topically has not been effective and lidocaine patches break him out.  He has a pain management provider, Dr. Antonio Loyd and I recommended follow-up with him regarding other possible interventions for postherpetic neuralgia pain.  Patient and his wife plan to make an appointment.    -He has follow-ups with oncology, Dr. Rai on 4/1/2020 as well as cardiology, Dr. Devine on 4/7/2020.    -Spoke with patient and his wife and reviewed Dr. Rai's last note.  It looks like the plan is to discuss possibly reinitiating Avastin therapy depending on patient's status at his next follow-up in April.    -He reports that his appetite has returned since his hospitalization in January 2020.  He is eating a well-balanced diet.  He has lost about 14 pounds since November but is slowly starting to gain weight back.    -Follow-up PRN and I will see him back in about 6 months for routine health  maintenance/follow-up on chronic conditions.

## 2020-02-21 NOTE — PATIENT INSTRUCTIONS
Zoster Vaccine, Recombinant injection  What is this medicine?  ZOSTER VACCINE (ZOS ter vak SEEN) is used to prevent shingles in adults 50 years old and over. This vaccine is not used to treat shingles or nerve pain from shingles.  This medicine may be used for other purposes; ask your health care provider or pharmacist if you have questions.  COMMON BRAND NAME(S): SHINGRIX  What should I tell my health care provider before I take this medicine?  They need to know if you have any of these conditions:  -blood disorders or disease  -cancer like leukemia or lymphoma  -immune system problems or therapy  -an unusual or allergic reaction to vaccines, other medications, foods, dyes, or preservatives  -pregnant or trying to get pregnant  -breast-feeding  How should I use this medicine?  This vaccine is for injection in a muscle. It is given by a health care professional.  Talk to your pediatrician regarding the use of this medicine in children. This medicine is not approved for use in children.  Overdosage: If you think you have taken too much of this medicine contact a poison control center or emergency room at once.  NOTE: This medicine is only for you. Do not share this medicine with others.  What if I miss a dose?  Keep appointments for follow-up (booster) doses as directed. It is important not to miss your dose. Call your doctor or health care professional if you are unable to keep an appointment.  What may interact with this medicine?  -medicines that suppress your immune system  -medicines to treat cancer  -steroid medicines like prednisone or cortisone  This list may not describe all possible interactions. Give your health care provider a list of all the medicines, herbs, non-prescription drugs, or dietary supplements you use. Also tell them if you smoke, drink alcohol, or use illegal drugs. Some items may interact with your medicine.  What should I watch for while using this medicine?  Visit your doctor for regular  check ups.  This vaccine, like all vaccines, may not fully protect everyone.  What side effects may I notice from receiving this medicine?  Side effects that you should report to your doctor or health care professional as soon as possible:  -allergic reactions like skin rash, itching or hives, swelling of the face, lips, or tongue  -breathing problems  Side effects that usually do not require medical attention (report these to your doctor or health care professional if they continue or are bothersome):  -chills  -headache  -fever  -nausea, vomiting  -redness, warmth, pain, swelling or itching at site where injected  -tiredness  This list may not describe all possible side effects. Call your doctor for medical advice about side effects. You may report side effects to FDA at 2-492-QIR-2955.  Where should I keep my medicine?  This vaccine is only given in a clinic, pharmacy, doctor's office, or other health care setting and will not be stored at home.  NOTE: This sheet is a summary. It may not cover all possible information. If you have questions about this medicine, talk to your doctor, pharmacist, or health care provider.  ©  Elsevier/Gold Standard (2018 13:20:30)    Medicare Wellness  Personal Prevention Plan of Service     Date of Office Visit:  2020  Encounter Provider:  FERMIN Turpin  Place of Service:  [facility] INTERNAL MEDICINE  Patient Name: Silas Haines  :  1935    As part of the Medicare Wellness portion of your visit today, we are providing you with this personalized preventive plan of services (PPPS). This plan is based upon recommendations of the [place] Preventive Services Task Force (USPSTF) and the Advisory Committee on Immunization Practices (ACIP).    This lists the preventive care services that should be considered, and provides dates of when you are due. Items listed as completed are up-to-date and do not require any further  intervention.    Health Maintenance   Topic Date Due   • TDAP/TD VACCINES (1 - Tdap) 05/16/1947   • ZOSTER VACCINE (1 of 2) 05/16/1986   • Pneumococcal Vaccine Once at 65 Years Old  05/16/2001   • MEDICARE ANNUAL WELLNESS  01/25/2016   • DXA SCAN  11/16/2017   • LIPID PANEL  08/08/2019   • INFLUENZA VACCINE  Completed       No orders of the defined types were placed in this encounter.      No follow-ups on file.

## 2020-02-24 NOTE — TELEPHONE ENCOUNTER
[facility] CALLED IN TO REQUEST A VERBAL ORDER FOR SPEECH THERAPY FOR PT. 2X A WEEK FOR 4 WEEKS. PLEASE ADVISE.      CALL BACK [phone] AMRIT

## 2020-04-23 NOTE — TELEPHONE ENCOUNTER
[facility]RIT FROM [facility] IS REQUESTING VERBAL ORDERS FOR SPEECH THERAPY TO DO 1 DAY A WEEK FOR 4 WEEKS    PLEASE ADVISE  [phone]

## 2020-04-23 NOTE — TELEPHONE ENCOUNTER
I attempted to call SABI Nice at number listed in note, no answer. Left voicemail for him to return call. Okay to give verbal orders for home health speech therapy.

## 2020-06-18 NOTE — TELEPHONE ENCOUNTER
PATIENT'S WIFE ROSE CALLED AND STATES PATIENT WAS SEEN AT ER AND THEN ADMITTED TO [facility] ON 6/12/2020-6/14/2020  FOR  ISSUES WITH HIS RIGHT EYE. SEEING ALL WHITE. EYES ARE HEALTHY. HAD MRI, CT SCAN, DOPPLER FOR CORTAID ARTERY, HALTER MONITOR.   HE WAS DIAGNOSED WITH DRY EYE. A LITTLE BLOCKAGE NOT ENOUGH FOR PACEMAKER.    PLEASE CALL [phone]  FOR HOSPITAL FOLLOW UP APPOINTMENT

## 2020-06-24 NOTE — DISCHARGE INSTRUCTIONS
Take the following medications the morning of surgery with a small sip of water:   AMLODIPINE, DECADRON, FAMOTIDINE, KEPPRA, (ACETAMINOPHEN IF NEEDED)    ARRIVAL TIME 11:00 TO MAIN OR        General Instructions:  • Do not eat or drink anything after midnight the night before surgery.  • Infants may have breast milk up to four hours before surgery.  • Infants drinking formula may drink formula up to six hours before surgery.   • Patients who avoid smoking, chewing tobacco and alcohol for 4 weeks prior to surgery have a reduced risk of post-operative complications.  Quit smoking as many days before surgery as you can.  • Do not smoke, use chewing tobacco or drink alcohol the day of surgery.   • If applicable bring your C-PAP/ BI-PAP machine.  • Bring any papers given to you in the doctor’s office.  • Wear clean comfortable clothes.  • Do not wear contact lenses, false eyelashes or make-up.  Bring a case for your glasses.   • Bring crutches or walker if applicable.  • Remove all piercings.  Leave jewelry and any other valuables at home.  • Hair extensions with metal clips must be removed prior to surgery.  • The Pre-Admission Testing nurse will instruct you to bring medications if unable to obtain an accurate list in Pre-Admission Testing.        If you were given a blood bank ID arm band remember to bring it with you the day of surgery.    Preventing a Surgical Site Infection:  • For 2 to 3 days before surgery, avoid shaving with a razor because the razor can irritate skin and make it easier to develop an infection.    • Any areas of open skin can increase the risk of a post-operative wound infection by allowing bacteria to enter and travel throughout the body.  Notify your surgeon if you have any skin wounds / rashes even if it is not near the expected surgical site.  The area will need assessed to determine if surgery should be delayed until it is healed.  • The night prior to surgery sleep in a clean bed with  clean clothing.  Do not allow pets to sleep with you.  • Shower on the morning of surgery using a fresh bar of anti-bacterial soap (such as Dial) and clean washcloth.  Dry with a clean towel and dress in clean clothing.  • Ask your surgeon if you will be receiving antibiotics prior to surgery.  • Make sure you, your family, and all healthcare providers clean their hands with soap and water or an alcohol based hand  before caring for you or your wound.    Day of surgery:  Your arrival time is approximately two hours before your scheduled surgery time.  Upon arrival, a Pre-op nurse and Anesthesiologist will review your health history, obtain vital signs, and answer questions you may have.  The only belongings needed at this time will be your home medications and if applicable your C-PAP/BI-PAP machine.  If you are staying overnight your family can leave the rest of your belongings in the car and bring them to your room later.  A Pre-op nurse will start an IV and you may receive medication in preparation for surgery, including something to help you relax.  Your family will be able to see you in the Pre-op area.  Two visitors at a time will be allowed in the Pre-op room.  While you are in surgery your family should notify the waiting room  if they leave the waiting room area and provide a contact phone number.    Please be aware that surgery does come with discomfort.  We want to make every effort to control your discomfort so please discuss any uncontrolled symptoms with your nurse.   Your doctor will most likely have prescribed pain medications.      If you are going home after surgery you will receive individualized written care instructions before being discharged.  A responsible adult must drive you to and from the hospital on the day of your surgery and stay with you for 24 hours.    If you are staying overnight following surgery, you will be transported to your hospital room following the  recovery period.  [facility] has all private rooms.    If you have any questions please call Pre-Admission Testing at [phone].  Deductibles and co-payments are collected on the day of service. Please be prepared to pay the required co-pay, deductible or deposit on the day of service as defined by your plan.    Patient Education for Self-Quarantine Process    Following your COVID testing, we strongly recommend that you do not leave your home after you have been tested for COVID except to get medical care. This includes not going to work, school or to public areas.  If this is not possible for you to do please limit your activities to only required outings.  Be sure to wear a mask when you are with other people, practice social distancing and wash your hands frequently.      The following items provide additional details to keep you safe.  • Wash your hands with soap and water frequently for at least 20 seconds.   • Avoid touching your eyes, nose and mouth with unwashed hands.  • Do not share anything - utensils, towels, food from the same bowl.   • Have your own utensils, drinking glass, dishes, towels and bedding.   • Do not have visitors.   • Do use FaceTime to stay in touch with family and friends.  • You should stay in a specific room away from others if possible.   • Stay at least 6 feet away from others in the home if you cannot have a dedicated room to yourself.   • Do not snuggle with your pet. While the CDC says there is no evidence that pets can spread COVID-19 or be infected from humans, it is probably best to avoid “petting, snuggling, being kissed or licked and sharing food (during self-quarantine)”, according to the CDC.   • Sanitize household surfaces daily. Include all high touch areas (door handles, light switches, phones, countertops, etc.)  • Do not share a bathroom with others, if possible.   • Wear a mask around others in your home if you are unable to stay in a separate room  or 6 feet apart. If  you are unable to wear a mask, have your family member wear a mask if they must be within 6 feet of you.   Call your surgeon immediately if you experience any of the following symptoms:  • Sore Throat  • Shortness of Breath or difficulty breathing  • Cough  • Chills  • Body soreness or muscle pain  • Headache  • Fever  • New loss of taste or smell  • Do not arrive for your surgery ill.  Your procedure will need to be rescheduled to another time.  You will need to call your physician before the day of surgery to avoid any unnecessary exposure to hospital staff as well as other patients.

## 2020-06-26 PROBLEM — N13.1 HYDRONEPHROSIS DUE TO OBSTRUCTION OF URETERAL ORIFICE: Status: ACTIVE | Noted: 2020-01-01

## 2020-06-26 NOTE — ANESTHESIA PROCEDURE NOTES
Airway  Urgency: elective    Date/Time: 6/26/2020 1:29 PM  Airway not difficult    General Information and Staff    Patient location during procedure: OR  Anesthesiologist: Nydia Lerner MD  CRNA: Tamara Coronel CRNA    Indications and Patient Condition  Indications for airway management: airway protection    Preoxygenated: yes  MILS maintained throughout  Mask difficulty assessment: 0 - not attempted    Final Airway Details  Final airway type: supraglottic airway      Successful airway: classic  Size 4    Number of attempts at approach: 1  Assessment: lips, teeth, and gum same as pre-op and atraumatic intubation

## 2020-06-26 NOTE — ANESTHESIA POSTPROCEDURE EVALUATION
"Patient: Silas Haines    Procedure Summary     Date:  06/26/20 Room / Location:  [facility] OR 01 / [facility] MAIN OR    Anesthesia Start:  1317 Anesthesia Stop:  1404    Procedure:  BILATERAL CYSTOSCOPY STENT CHANGE (Bilateral ) Diagnosis:       Hydronephrosis, bilateral      (Hydronephrosis)    Surgeon:  Louis Jules MD Provider:  Nydia Lerner MD    Anesthesia Type:  general ASA Status:  3          Anesthesia Type: general    Vitals  Vitals Value Taken Time   /89 6/26/2020  3:45 PM   Temp 36.4 °C (97.6 °F) 6/26/2020  1:58 PM   Pulse 71 6/26/2020  3:48 PM   Resp 16 6/26/2020  3:40 PM   SpO2 99 % 6/26/2020  3:49 PM   Vitals shown include unvalidated device data.        Post Anesthesia Care and Evaluation    Patient location during evaluation: PHASE II  Patient participation: complete - patient participated  Level of consciousness: awake and alert  Pain management: adequate  Airway patency: patent  Anesthetic complications: No anesthetic complications    Cardiovascular status: acceptable  Respiratory status: acceptable  Hydration status: acceptable    Comments: BP (!) 187/89 (BP Location: Right arm, Patient Position: Lying)   Pulse 73   Temp 36.4 °C (97.6 °F) (Oral)   Resp 16   Ht 182.9 cm (72\")   Wt 67.7 kg (149 lb 3.2 oz)   SpO2 100%   BMI 20.24 kg/m²         "

## 2020-06-26 NOTE — ANESTHESIA PREPROCEDURE EVALUATION
Anesthesia Evaluation     Patient summary reviewed and Nursing notes reviewed   NPO Solid Status: > 8 hours  NPO Liquid Status: > 2 hours           Airway   Mallampati: II  TM distance: >3 FB  Neck ROM: full  No difficulty expected  Dental - normal exam     Pulmonary - negative pulmonary ROS and normal exam   (-) pneumonia  Cardiovascular - normal exam  Exercise tolerance: good (4-7 METS)    ECG reviewed  Rhythm: regular    (+) hypertension 2 medications or greater, CAD, CABG >6 Months, hyperlipidemia,       Neuro/Psych  (+) seizures poorly controlled, poor historian.,     (-) numbness    ROS Comment: H/O brain tumor, Glioblastoma, on chemo and radiation.   H/O seizures, mostly affecting face.  Keppra dose was increased about a week ago.   Last seizure was about 48 hr prior.   GI/Hepatic/Renal/Endo    (+)   renal disease stones,     Musculoskeletal     Abdominal  - normal exam    Bowel sounds: normal.   Substance History - negative use     OB/GYN negative ob/gyn ROS         Other   arthritis,    history of cancer remission    ROS/Med Hx Other: H/O baseline confusion.       Phys Exam Other: Not completely oriented x 3.               Anesthesia Plan    ASA 3     general and MAC   (Plan to start with MAC and convert to GA if necessary. )  intravenous induction     Anesthetic plan, all risks, benefits, and alternatives have been provided, discussed and informed consent has been obtained with: patient.    Plan discussed with CRNA and attending.

## 2020-07-02 NOTE — PROGRESS NOTES
"Subjective   Silas Haines is a 85 y.o. male.   CC: Hospital follow-up, GBM visual changes    Patient presents for hospital follow-up.  This is an 85-year-old male with CAD, history of prostate cancer, hyperlipidemia, hypertension, glioblastoma multiform, seizure disorder.  He was hospitalized at [facility] from 6/12/2020 through 6/14/2020 after presenting with acute right eye visual changes.  He was seen by retina specialist before admission and encouraged to go to the hospital due to normal eye exam.  MRI of the brain was done which showed interval decrease in size and enhancement/associated edema of his left frontal GBM according to the discharge summary.  No acute abnormality of the MRI.  Also no significant carotid stenosis.  He was discharged and encouraged to follow-up with primary care.  Since then he was admitted for bilateral stent exchange with urology, Dr. Jules on 6/26/2020.  Wife reports that he had a hard time getting over the general anesthesia.  He has seizure disorder and takes Keppra 500 mg in the morning and 750 mg at night.  He has had a few seizures since last neurology follow-up and he sees neurology on 7/9/2020.  Additionally, hemoglobin was low at 8.0 on last check in the hospital.  He has an appointment with Dr. Rai, oncology with [facility] on 7/8/2020 for recheck and follow-up.  Reports no shortness of breath, fever or chills.  Reports that he is actually feeling a bit better today.  He has eyedrops which are helping with the right eye \"snowy vision\" and dryness.  He denies development of any other new issues today.       The following portions of the patient's history were reviewed and updated as appropriate: allergies, current medications, past family history, past medical history, past social history, past surgical history and problem list.    Review of Systems   Constitutional: Negative for activity change, chills, fatigue, fever, unexpected " "weight gain and unexpected weight loss.   HENT: Negative for congestion, hearing loss, postnasal drip, sinus pressure, sneezing, sore throat, swollen glands and tinnitus.    Eyes: Negative for photophobia, pain and visual disturbance.   Respiratory: Negative for cough, chest tightness, shortness of breath and wheezing.    Cardiovascular: Negative for chest pain, palpitations and leg swelling.   Gastrointestinal: Negative for abdominal distention, abdominal pain, constipation, diarrhea, nausea and vomiting.   Endocrine: Negative for polydipsia, polyphagia and polyuria.   Genitourinary: Negative for dysuria, frequency, hematuria and urgency.   Neurological: Positive for seizures. Negative for dizziness, weakness, numbness and headache.   All other systems reviewed and are negative.      Objective    /61 (BP Location: Left arm, Patient Position: Sitting, Cuff Size: Adult)   Pulse 51 Comment: recheck  Temp 97.7 °F (36.5 °C) (Oral)   Resp 16   Ht 182.9 cm (72\")   Wt 68.5 kg (151 lb)   SpO2 96%   BMI 20.48 kg/m²     Physical Exam   Constitutional: He is oriented to person, place, and time. He appears well-developed.   HENT:   Head: Atraumatic.   Eyes: Pupils are equal, round, and reactive to light.   Neck: Normal range of motion. Neck supple.   Cardiovascular: Normal rate and regular rhythm.   Pulmonary/Chest: Effort normal and breath sounds normal.   Abdominal: Soft. Bowel sounds are normal. He exhibits no distension. There is no tenderness.   Musculoskeletal: Normal range of motion.   Neurological: He is alert and oriented to person, place, and time.   Skin: Capillary refill takes less than 2 seconds.   Psychiatric: He has a normal mood and affect. His behavior is normal.   Nursing note and vitals reviewed.    Current outpatient and discharge medications have been reconciled for the patient.  Reviewed by: FERMIN Turpin      Assessment/Barbara   Silas was seen today for follow-up.    Diagnoses and all " orders for this visit:    Hospital discharge follow-up    Respiratory crackles at right lung base  -     Cancel: XR Chest 2 View  -     XR Chest 2 View    Glioblastoma multiforme (CMS/HCC)/ left side    Visual changes      -Hospital follow-up: Reviewed discharge summary, imaging, labs from 6/12/2020 through 6/14/2020 hospital stay at [facility].  Visual changes have returned to baseline and he follows with his retina specialist routinely, Dr. Cain Preciado.    -On physical exam today coarse right lung base crackles.  Chest x-ray today.    -He has a follow-up with oncology on 7/8/2020 with repeat labs to check low hemoglobin.    -Glioblastoma multiform, seizure disorder: He follows up with neurology again on 7/9/2020.  Continue Keppra as directed by neurology.    -We will contact patient with his x-ray results and further recommendations.  Follow-up PRN and I will see him at his next scheduled office visit.

## 2020-07-02 NOTE — PROGRESS NOTES
Please contact patient's wife and let them know that the chest x-ray is looking stable with no pneumonia or fluid on the lung.  Please let me know if any development of shortness of breath and do keep the follow-up with neurology coming up soon.  Please let me know of any questions in the meantime.

## 2020-08-17 PROBLEM — C71.9 GLIOBLASTOMA (HCC): Status: ACTIVE | Noted: 2020-01-01

## 2020-08-24 PROBLEM — E87.1 HYPONATREMIA: Status: ACTIVE | Noted: 2020-01-01

## 2020-08-24 PROBLEM — N19 UREMIA: Status: ACTIVE | Noted: 2020-01-01

## 2020-08-24 PROBLEM — E86.0 DEHYDRATION: Status: ACTIVE | Noted: 2020-01-01

## 2020-08-28 PROBLEM — R56.9 SEIZURE (HCC): Status: ACTIVE | Noted: 2020-01-01

## 2020-09-01 NOTE — PROGRESS NOTES
PPS CMG Coordinator  Inpatient Rehabilitation Discharge    Mode of Locomotion: Walking.    Discharge Against Medical Advice:  No.  Discharge Information  Patient Discharged Alive:  Yes  Discharge Destination/Living Setting: Home with Home Health Services  Diagnosis for Interruption/Death: ICD    Impairment Group: Brain Dysfunction: 02.1 Non-traumatic    Comorbidities: ICD    Complications: ICD    QUALITY INDICATORS  Section J Health Conditions: Fall(s) Since Admission:  No    Section M. Skin Conditions Discharge:  Unhealed Pressure Ulcer(s) at Stage 1 or  Higher:  No    . Current Number of Unhealed Pressure Ulcers  Branch    Section N. Medication:  Medication Intervention: N/A - There were no potential clinically significant  medication issues identified since admission or patient is not taking any  medications.    Signed by: Jacob Chamberlain RN

## 2020-09-02 NOTE — TELEPHONE ENCOUNTER
BRIANA FROM [facility] REQUESTS ORDERS FOR LABS ONCE A WEEK FOR 9 WEEKS. BMP.  PLEASE ADVISE  [phone] BRIANA CELESTIN

## 2020-09-03 NOTE — TELEPHONE ENCOUNTER
Racheal from [facility] calling pt is being seen for PT, OT and speech but they will be discharging pt from Nursing after next week.  Pt is only having BMP drawn and they cannot continue to draw labs for nursing.  They would like to know if they can do a telephone visit with pt next week or if they have to draw BMP.  Please advise

## 2020-09-03 NOTE — TELEPHONE ENCOUNTER
I was sent a message yesterday asking me for orders for BMPs for 9 weeks. I am unsure the reason for 9 weeks of BMPs. Are they asking for an extension of home health?

## 2020-09-03 NOTE — DISCHARGE SUMMARY
[facility] - REHABILITATION UNIT    JAKE DOUGLAS  1935    Date of admission August 15, 2020  Date of discharge August 29, 2020    Chief complaint : RHP and aphasia d/t L Frontal GBM    History of Present Illness: 86 yo white male with knwn h/o left frontal GBM that was admitted for worsening RHP and aphasia. Found to have progression of mass and surrounding edema. Hospital course marked by decision to not place pacemaker for A-fib. tranferred to [facility] for inpt rehab before d/c home with family assistance.    Hospital course:  Problem list-  The patient participate in a comprehensive inpatient rehabilitation program.  He was limited significantly by his cognitive deficits, aphasia, and fatigue level.  During his hospital stay the following areas were addressed-    RHP and Aphasia d/t left frontal GBM -  On decadron taper to 2 mg daily thereafter.  S/p Avastin on August 14 and to re-assess for repeat based of follow up imaging around two weeks from that per discharge summary.   August 21-Staff notes he has been more lethargic.  He is due for decrease in Decadron dose on August 22 so we will need to watch for any change with that.  With physical therapy he acted as though he thought he had something in his right hand when he did not..  He did ambulate with physical therapy  80 x 3 up to 160 feet x 3 with a rolling walker minimum assist.  Bathing minimum assist.  Upper body dressing moderate assist.  Lower body dressing minimum assist.  Difficulty getting patient to sit back down when standing throughout entire actives of daily living.  He continues with severe aphasia.  He does not identify any complaint.  Would not follow commands for examiner.  His wife reports that she thought he ate a little bit better for her middle of the day.  Will recheck CBC/CMP in the a.m. Monitor volume status   August 24-BUN elevated/sodium 127-consult internal medicine for hyponatremia/prerenal azotemia.  Lethargy  may in part be related to his volume status.  Steroid dose was decreased on Saturday but he had lethargy before then.  August 25-more alert today with IV fluid hydration.  He does fatigue in the afternoons.  Reviewed with wife possibility of adding on amantadine for neuro stimulation.  She wishes to think about it for now.  August 26-Reviewed with patient's wife regarding adding a neuro stimulant-she reports she has a history of paradoxical reaction and wishes to not pursue a trial of amantadine  August 27-patient much more alert after IV fluid hydration.  Reinforced importance of p.o. and liquid intake once home      Back Pain - OK to use CBD oil and topical cream from home.      Hematuria - History of Baldder CA with pink urine. Check Hb Monday.-Stable  August 19, 2020-increased hematuria today with bloody urine.  PT/INR/PTT were normal on August 12.  Urinalysis ordered.  Will recheck CBC.  Consult his urologist Dr. Jules to see. Add: Empiric antibiotics with Levaquin 250 mg po daily x 7 days pending culture. WBC 4.66. HGB stable 9.5. Cr stable at 1.44., but BUN increasing.   August 20 - >100K GNR addendum:  Proteus mirabilis - sensitive to levaquin  August 21-changed to Keflex           Renal insuff - August 19 - Pre-renal azotemia -Cr stable at 1.44., but BUN increasing.    encourage fluids.   August 20 - Recheck BMP on August 20 - similar. Reviewed with wife - encourage fluids.  August 21-recheck labs in the a.m.  August 24-BUN elevated/sodium 127-consult internal medicine for hyponatremia/prerenal azotemia.  Lethargy may in part be related to his volume status.  Steroid dose was decreased on Saturday but he had lethargy before then.  August 25-hyponatremia improved with normal saline hydration.  More alert today.  August 26-BUN improved to 29, creatinine 0.98-possibly discontinue IV fluids today  August 27-IV fluids discontinued     HTN/Bradycardia -  On increased amlodipine and lisinopril compared to home  doses. May in part be related to steroids.  August 19-add hydralazine 10 mg every 6 hours as needed systolic blood pressure greater than 170     DVT prophylaxis - SCDs     GI prophylaxis - Pepcid      Seizure disorder - Keppra - per wife does not tolerte going up n dose past 500 mg bid     Sleep - trazodone 50 mg nightly     TEAM CONF - AUGUST 18 -    Bed minmod. Gait 80 feet min assist RW.  Bath min. UBD min. LBD mod. Toilet transfers min. Toileting mod assist. Profound aphasia. Difficulty following instructions. Processing slow. Could ID pictures, colors, objects with cues. Severely impaired naming, simple conservatin interactin. Speech mainly unintelligible/jargon. Impulsive. Bladder 75% continent. Bladder frequency - will try timed voids. Bowel continent.   ELOS- 2 weeks     TEAM CONF - AUGUST 25 -   COGNITION LIMITS ALL TASKS AND NEEDS LOTS OF CUING. TOILET TRANSFERS CTG. BATH CTG-MIN. BUD SBA-MIN. LBD MIN. EATING SBA. CAN FEED HIMSELF BUT NEED ASSISTANCE FOR BETTER INTAKE.  GROOMING SBA. TOILETING MIN. FATIGUES BY AFTERNOON SESSIONS. BED MIN-MOD ASSIST. TRANSFERS MIN ASSIST 1-2, CURB MIN-MOD ASSIST. GAIT 160 FEET MIN ASSIST RW. NOT PARTICIPATING IN SLP THERAPIES. IVF HYDRATION.   ELOS -   WEEKEND-Saturday, August 29 August 28-anticipate discharge home tomorrow with assistance from family  August 29-patient was discharged home with family    Physical Exam   Constitutional: Awake.  Poor attention.  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   Pulmonary/Chest: Effort normal and breath sounds normal.   Abdominal: Soft. Bowel sounds are normal. He exhibits no distension. There is no tenderness. There is no guarding.   Musculoskeletal:    Neurological: Patient fatigued when seen on rounds and did not participate with examination  Skin: Skin is warm and dry. No rash noted.               Results Review:              I reviewed the patient's new clinical  results.               Results from last 7 days   Lab Units 08/28/20  0531 08/27/20  0911 08/26/20  0832   08/24/20  0435 08/22/20  0543   SODIUM mmol/L 133* 133* 135*   < > 127* 137   POTASSIUM mmol/L 3.8 3.8 4.0   < > 4.3 4.4   CHLORIDE mmol/L 105 104 105   < > 100 108*   CO2 mmol/L 22.2 19.8* 20.0*   < > 22.1 22.7   BUN mg/dL 31* 27* 29*   < > 63* 55*   CREATININE mg/dL 1.00 0.87 0.98   < > 1.31* 1.34*   CALCIUM mg/dL 8.7 8.5* 8.5*   < > 9.0 9.0   BILIRUBIN mg/dL  --   --   --   --  0.4 0.3   ALK PHOS U/L  --   --   --   --  52 51   ALT (SGPT) U/L  --   --   --   --  40 34   AST (SGOT) U/L  --   --   --   --  21 21   GLUCOSE mg/dL 90 98 112*   < > 125* 129*    < > = values in this interval not displayed.             Results from last 7 days   Lab Units 08/26/20  0832 08/24/20  0435 08/22/20  0543   WBC 10*3/mm3 5.92 6.05 5.00   HEMOGLOBIN g/dL 9.9* 10.0* 9.6*   HEMATOCRIT % 30.6* 29.3* 29.1*   PLATELETS 10*3/mm3 134* 158 140            Name Relationship Specialty Phone Fax Address Order              Avastin treatment on Monday August 31,2020           Next Steps: Follow up      Vanita Westbrook APRN  PCP - General Nurse Practitioner  Family Medicine [phone] [address]  [facility] [address]     Next Steps: Schedule an appointment as soon as possible for a visit on 9/14/2020      Instructions: appointment 9/14 on 230pm       Felix Barney II, MD  Consulting Physician Neurology [phone] [address]  [facility] [address]     Next Steps: Follow up      Instructions: appointment on november 12,2020 @ 2:30 pm      Justo Adler MD   Physical Medicine and Rehabilitation [phone] [address] [place] [address]     Next Steps: Follow up      Instructions: No scheduled follow-up needed with Brandyn Gonzalez MD   Medical Oncology [phone] [place] [address]  [facility]  [address]  [facility] [address]     Next Steps: Schedule an appointment as soon as possible for a visit      Instructions: appointment on august 31,2020 @ 2:00 pm followed by treatment that day      [facility] [phone] [address]  [facility] [address]     Next Steps: Follow up      Instructions: You will have home PT, OT, ST, NSG. They will call to schedule in home visits.       Neri Devine MD  Consulting Physician Interventional Cardiology [phone] [facility]  [address]  [facility] [address]     Next Steps: Follow up      Instructions: appointment on september 1,2020 @ 1:15 pm      Louis Jules MD  Consulting Physician Urology [phone] [address]  [facility] [address]     Next Steps: Follow up             Discharge Medications      New Medications      Instructions Start Date   clonazePAM 0.5 MG tablet  Commonly known as:  KlonoPIN   0.25 mg, Oral, Daily PRN      docusate sodium 150 MG/15ML liquid  Commonly known as:  COLACE   100 mg, Oral, 2 Times Daily      hydrALAZINE 10 MG tablet  Commonly known as:  APRESOLINE   10 mg, Oral, Every 6 Hours PRN      senna 8.6 MG tablet  Commonly known as:  SENOKOT   1 tablet, Oral, Nightly PRN      traZODone 50 MG tablet  Commonly known as:  DESYREL   50 mg, Oral, Nightly PRN         Changes to Medications      Instructions Start Date   amLODIPine 10 MG tablet  Commonly known as:  NORVASC  What changed:    · medication strength  · how much to take  · when to take this   10 mg, Oral, Every 24 Hours Scheduled      dexamethasone 2 MG tablet  Commonly known as:  DECADRON  What changed:    · medication strength  · how much to take   2 mg, Oral, Daily With Breakfast      fluticasone 50 MCG/ACT nasal spray  Commonly known as:  Flonase  What changed:    · when to take this  · reasons to take this   2 sprays,  Nasal, Daily      levETIRAcetam 500 MG tablet  Commonly known as:  KEPPRA  What changed:    · when to take this  · Another medication with the same name was removed. Continue taking this medication, and follow the directions you see here.   500 mg, Oral, 2 Times Daily      lisinopril 40 MG tablet  Commonly known as:  PRINIVIL,ZESTRIL  What changed:    · medication strength  · how much to take  · when to take this   40 mg, Oral, Every 24 Hours Scheduled         Continue These Medications      Instructions Start Date   acetaminophen 500 MG tablet  Commonly known as:  TYLENOL   500 mg, Oral, Every 6 Hours PRN      atorvastatin 20 MG tablet  Commonly known as:  LIPITOR   TAKE ONE TABLET BY MOUTH DAILY      AVASTIN IV   Intravenous, Every 14 Days      CBD oral oil  Commonly known as:  cannabidiol   60 mL, Sublingual, TO HOLD PRIOR TO OR      EQUATE PO   100 mg, Oral, Daily PRN      famotidine 20 MG tablet  Commonly known as:  PEPCID   20 mg, Oral, Daily      isosorbide mononitrate 30 MG 24 hr tablet  Commonly known as:  IMDUR   30 mg, Oral, Every Evening      Multi Vitamin Mens tablet   1 tablet, Oral, Daily, TO HOLD PRIOR TO OR      Vitamin D (Ergocalciferol) 50 MCG (2000 UT) capsule   1 capsule, Oral, Daily           [facility] PT, OT, SLP, Nursing - monitor volume status. Lab draws with weekly BMP - results to Primary Care.     Encourage p.o./fluid intake    Blood pressure management-hydralazine 10 mg p.o. every 6 hours as needed for systolic blood pressure greater than 170

## 2020-09-04 NOTE — TELEPHONE ENCOUNTER
Racheal called back to inform that they can see patient next week and draw BMP.  There is no longer nursing skill needed and drawing labs is not considered a nursing skill per Medicare.  If pt will need to come in if continued BMP is needed. They will draw next week and the week of 9/13 and will DC pt for nursing then

## 2020-09-04 NOTE — TELEPHONE ENCOUNTER
I was told originally that patient will need BMP x 9 weeks, but am unsure why. Can you see if home health knows why?

## 2020-09-08 NOTE — TELEPHONE ENCOUNTER
Hospitalist at [place] said BMP weekly.  Home Health's care periods are 9 weeks that's were the 9 came into play.  If you need pt to continue with BMP we will need to have him come in the office

## 2020-09-10 NOTE — TELEPHONE ENCOUNTER
FLEX PARKER WITH [facility] CALLED IN REFERENCE TO SPEECH THERAPY EVALUATION WHICH WAS TO BE THIS WEEK. SHE IS RUNNING BEHIND AND SWAMPED. AND WANTS TO KNOW IF IT IS ALRIGHT TO HAVE EVALUATION NEXT WEEK.    PLEASE CALL [phone]    THIS PATIENT IS ABAD PARIKH

## 2020-09-21 NOTE — PROGRESS NOTES
Subjective   Silas Haines is a 85 y.o. male.   Chief Complaint   Patient presents with   • Follow-up     Pt presents here today for a 6 month follow up.   Hypertension, hyperlipidemia    Patient presents for 6-month follow-up.  This is an 85-year-old male.  He is accompanied by his wife who helps provide HPI.    He has a history of multiple cancers including glioblastoma multiform and seizure disorder.  He follows with neurology as well as oncology, Dr. Rai with [facility].  He has an upcoming appointment with [facility] on 9/28/2020.  Maintained on Keppra.  He has been using sublingual CBD oil as well and is now back on Avastin.  Currently on Decadron and this is being weaned by oncology.  He had a recent inpatient rehab stay coordinated by Dr. Adler, physical medicine and rehab.  Currently back at home and doing home health PT/OT/speech therapy routinely.  Wife reports that he is doing well and has a good appetite.  Weight has been consistent, 143 pounds over the past month.  He is eating nutritious foods.  Sleeping well and has not needed the trazodone.    He has hyperlipidemia and takes atorvastatin 20 mg daily reporting good compliance with this medication.    He has hypertension and takes amlodipine 10 mg daily, Imdur 30 mg daily, lisinopril 40 mg daily and hydralazine 10 mg every 6 hours as needed for systolic blood pressure above 170.  He has not needed the hydralazine recently.  Blood pressure is well controlled today at 123/68.    He has a urology follow-up next month.    He has had his flu shot this year already on 9/18/2020.    Denies development of any other new issues today.       The following portions of the patient's history were reviewed and updated as appropriate: allergies, current medications, past family history, past medical history, past social history, past surgical history and problem list.    Review of Systems   Constitutional: Negative for activity  "change, chills, fatigue, fever, unexpected weight gain and unexpected weight loss.   HENT: Negative for congestion, hearing loss, postnasal drip, sinus pressure, sneezing, sore throat, swollen glands and tinnitus.    Eyes: Negative for photophobia, pain and visual disturbance.   Respiratory: Negative for cough, chest tightness, shortness of breath and wheezing.    Cardiovascular: Negative for chest pain, palpitations and leg swelling.   Gastrointestinal: Negative for abdominal distention, abdominal pain, constipation, diarrhea, nausea and vomiting.   Endocrine: Negative for polydipsia, polyphagia and polyuria.   Genitourinary: Negative for dysuria, frequency, hematuria and urgency.   Neurological: Negative for dizziness, weakness, numbness and headache.   All other systems reviewed and are negative.      Objective    /68 (BP Location: Right arm, Patient Position: Sitting, Cuff Size: Adult)   Pulse 70   Temp 98.5 °F (36.9 °C) (Oral)   Resp 16   Ht 182.9 cm (72\")   Wt 64.9 kg (143 lb)   SpO2 97%   BMI 19.39 kg/m²     Physical Exam  Vitals signs and nursing note reviewed. Exam conducted with a chaperone present.   Constitutional:       General: He is not in acute distress.     Appearance: Normal appearance. He is well-developed. He is not ill-appearing or diaphoretic.   HENT:      Head: Normocephalic and atraumatic.      Nose: Nose normal.      Mouth/Throat:      Mouth: Mucous membranes are moist.      Pharynx: Oropharynx is clear.   Eyes:      Pupils: Pupils are equal, round, and reactive to light.   Neck:      Musculoskeletal: Normal range of motion and neck supple.   Cardiovascular:      Rate and Rhythm: Normal rate and regular rhythm.      Pulses: Normal pulses.      Heart sounds: Normal heart sounds.      Comments: No peripheral edema  Pulmonary:      Effort: Pulmonary effort is normal. No respiratory distress.      Breath sounds: Normal breath sounds. No stridor. No wheezing, rhonchi or rales.      " Comments: Lungs CTA bilaterally  Chest:      Chest wall: No tenderness.   Abdominal:      General: Bowel sounds are normal. There is no distension.      Palpations: Abdomen is soft. There is no mass.      Tenderness: There is no abdominal tenderness. There is no right CVA tenderness, left CVA tenderness, guarding or rebound.      Hernia: No hernia is present.   Musculoskeletal: Normal range of motion.   Skin:     General: Skin is warm and dry.      Capillary Refill: Capillary refill takes less than 2 seconds.   Neurological:      Mental Status: He is alert and oriented to person, place, and time.   Psychiatric:         Mood and Affect: Mood normal.         Behavior: Behavior normal.       Current outpatient and discharge medications have been reconciled for the patient.  Reviewed by: FERMIN Turpin      Assessment/Barbara   Silas was seen today for follow-up.    Diagnoses and all orders for this visit:    Glioblastoma multiforme (CMS/HCC)/ left side    Essential hypertension  -     CBC No Differential  -     Comprehensive metabolic panel  -     lisinopril (PRINIVIL,ZESTRIL) 40 MG tablet; Take 1 tablet by mouth Daily.    Hypercholesterolemia  -     Lipid panel    Primary insomnia    Healthcare maintenance    -Glioblastoma multiform: Following with oncology, Dr. Klein and neurology.  He has had no seizures since leaving the hospital per wife and is doing well at home with PT/OT and speech therapy.  Continue Decadron, weaning per oncology bit continue Avastin as coordinated by specialist as well as Keppra.    -Hypertension: Well controlled today at 123/60.  Continue amlodipine, Imdur and lisinopril with PRN hydralazine.    -Hyperlipidemia: Continue atorvastatin 20 mg daily.  Lipid panel today we will adjust therapy if needed.    -Insomnia: This has been stable without the trazodone, he will keep it on hand for as needed use.    -Healthcare maintenance: He has had his flu shot this year already.    -Recheck of CBC,  CMP today.    -We will contact patient/wife with his lab results and any further recommendations.  Follow-up PRN and in 4 months for routine health maintenance/follow-up on chronic conditions.

## 2020-09-22 NOTE — PROGRESS NOTES
Please let patient/wife know that hemoglobin is still a bit low but holding steady and please ensure following with oncology for this as well. Creatinine (kidney function) slightly elevated and would like to recheck in one month, please make lab apt.Cholesterol looks great.

## 2020-10-20 NOTE — TELEPHONE ENCOUNTER
Racheal called with [facility]  [phone] wanted to know if you would want them to resume care  Or speech therapy have this being signed by Dr Soto

## 2020-10-22 NOTE — TELEPHONE ENCOUNTER
Nikki with [facility] needs a new order for speech therapy. Pt was hospitalized and is now back home and they need a new order. Please call [phone].

## 2020-11-11 NOTE — TELEPHONE ENCOUNTER
Caller: Carli Haines    Relationship: Emergency Contact    Best call back number: [phone]    Medication needed:   Requested Prescriptions     Pending Prescriptions Disp Refills   • amLODIPine (NORVASC) 10 MG tablet 30 tablet 1     Sig: Take 1 tablet by mouth Daily.       What is the patient's preferred pharmacy: [facility] [address] [facility] [address] - [phone] [facility] [phone] FX

## 2020-11-24 NOTE — TELEPHONE ENCOUNTER
PT'S DAUGHTER CALLED STATING PT IS LETHARGIC AND HAVING TROUBLE GETTING OUT OF BED TODAY.    THEY ARE WANTING TO HAVE HOME HEALTH NURSING AND POSSIBLY OCCUPATIONAL THERAPY COME OUT TO EVALUATE AND ASSIST PT. THEY WOULD ALSO LIKE TO DISCUSS IF HOSPICE CARE WOULD BE APPROPRIATE, AS PT IS AT END OF LIFE AND HAS A BRAIN TUMOR.    THEY ARE ALSO THINKING HE IS DEHYDRATED TODAY, AND WOULD LIKE TO KNOW WHAT TO DO. THEY CANNOT TRANSPORT HIM TO THE E.R. AND ARE CONCERNED ABOUT EXPOSURE RISKS GOING THERE ANYWAY.    PLEASE ADVISE.    CALLBACK NUMBER: [phone] (PT'S WIFE, SHONDA)

## 2020-11-24 NOTE — TELEPHONE ENCOUNTER
I left a message on the Daughter's voicemail to contact the Oncologist who I would guess has seen him recently since medicare requires a pt to have a face to face and also about the hospice questions.

## 2020-11-25 NOTE — TELEPHONE ENCOUNTER
I believe he is following with Dr. Hightower, I do want them to have a conversation with him about hospice if this is the direction they are leaning. Encourage fluids at home, please let me know of any other questions or concerns.

## 2021-01-01 ENCOUNTER — HOSPITAL ENCOUNTER (INPATIENT)
Facility: HOSPITAL | Age: 86
End: 2021-01-01
Attending: INTERNAL MEDICINE | Admitting: INTERNAL MEDICINE

## 2021-01-01 RX ORDER — ATORVASTATIN CALCIUM 20 MG/1
TABLET, FILM COATED ORAL
Qty: 90 TABLET | Refills: 1 | Status: SHIPPED | OUTPATIENT
Start: 2021-01-01

## 2021-03-04 DIAGNOSIS — Z23 IMMUNIZATION DUE: ICD-10-CM

## (undated) DEVICE — TIDISHIELD UROLOGY DRAIN BAGS FROSTY VINYL STERILE FITS SIEMENS UROSKOP ACCESS 20 PER CASE: Brand: TIDISHIELD

## (undated) DEVICE — DRN PENRS 5/8X18IN LTX

## (undated) DEVICE — TUBING, SUCTION, 1/4" X 20', STRAIGHT: Brand: MEDLINE INDUSTRIES, INC.

## (undated) DEVICE — LOU CYSTO: Brand: MEDLINE INDUSTRIES, INC.

## (undated) DEVICE — GLV SURG BIOGEL LTX PF 7

## (undated) DEVICE — TIDISHIELD UROLOGY DRAIN BAGS FROSTY VINYL FITS SIEMENS UROSKOP ACCESS 20 PER CASE: Brand: TIDISHIELD

## (undated) DEVICE — URINE LEG BAG WITH EXTENSION TUBE: Brand: DOVER

## (undated) DEVICE — SUT VIC 3/0 TIES 18IN J110T

## (undated) DEVICE — ENCORE® LATEX ORTHO SIZE 7.5, STERILE LATEX POWDER-FREE SURGICAL GLOVE: Brand: ENCORE

## (undated) DEVICE — NITINOL WIRE WITH HYDROPHILIC TIP: Brand: SENSOR

## (undated) DEVICE — CATH URETRL FLXITP POLLACK STD 5F 70CM

## (undated) DEVICE — Device: Brand: OLYMPUS

## (undated) DEVICE — LOU TUR: Brand: MEDLINE INDUSTRIES, INC.

## (undated) DEVICE — ADHS SKIN DERMABOND TOP ADVANCED

## (undated) DEVICE — PK PROC MINOR TOWER 40

## (undated) DEVICE — CATH COUVALAIRE SIMPLASTIC 3WY 24F 30CC

## (undated) DEVICE — APPL CHLORAPREP W/TINT 26ML ORNG

## (undated) DEVICE — BAG,DRAINAGE,4L,A/R TOWER,LL,SLIDE TAP: Brand: MEDLINE

## (undated) DEVICE — MAT FLR ABSORBENT LG 4FT 10 2.5FT

## (undated) DEVICE — SUT VIC 5/0 PS2 18IN J495H

## (undated) DEVICE — GOWN ,SIRUS,NONREINFORCED SMALL: Brand: MEDLINE

## (undated) DEVICE — ELECTRD BLD EDGE/INSUL1P 2.4X5.1MM STRL

## (undated) DEVICE — SUT VIC 3/0 SH 27IN J416H

## (undated) DEVICE — SUT ETHIB 0/0 MO6 I8IN CX45D